# Patient Record
Sex: FEMALE | Race: WHITE | NOT HISPANIC OR LATINO | Employment: FULL TIME | URBAN - METROPOLITAN AREA
[De-identification: names, ages, dates, MRNs, and addresses within clinical notes are randomized per-mention and may not be internally consistent; named-entity substitution may affect disease eponyms.]

---

## 2017-01-09 ENCOUNTER — OFFICE VISIT (OUTPATIENT)
Dept: FAMILY MEDICINE | Facility: CLINIC | Age: 52
End: 2017-01-09
Payer: COMMERCIAL

## 2017-01-09 VITALS
HEART RATE: 75 BPM | DIASTOLIC BLOOD PRESSURE: 82 MMHG | SYSTOLIC BLOOD PRESSURE: 128 MMHG | TEMPERATURE: 97.4 F | HEIGHT: 60 IN

## 2017-01-09 DIAGNOSIS — F41.9 ANXIETY: Primary | ICD-10-CM

## 2017-01-09 PROCEDURE — 99213 OFFICE O/P EST LOW 20 MIN: CPT | Performed by: NURSE PRACTITIONER

## 2017-01-09 RX ORDER — VENLAFAXINE HYDROCHLORIDE 75 MG/1
75 CAPSULE, EXTENDED RELEASE ORAL DAILY
Qty: 90 CAPSULE | Refills: 3 | Status: SHIPPED | OUTPATIENT
Start: 2017-01-09 | End: 2017-07-21

## 2017-01-09 ASSESSMENT — PATIENT HEALTH QUESTIONNAIRE - PHQ9: 5. POOR APPETITE OR OVEREATING: NOT AT ALL

## 2017-01-09 ASSESSMENT — ANXIETY QUESTIONNAIRES
3. WORRYING TOO MUCH ABOUT DIFFERENT THINGS: NOT AT ALL
1. FEELING NERVOUS, ANXIOUS, OR ON EDGE: NOT AT ALL
GAD7 TOTAL SCORE: 0
2. NOT BEING ABLE TO STOP OR CONTROL WORRYING: NOT AT ALL
5. BEING SO RESTLESS THAT IT IS HARD TO SIT STILL: NOT AT ALL
7. FEELING AFRAID AS IF SOMETHING AWFUL MIGHT HAPPEN: NOT AT ALL
6. BECOMING EASILY ANNOYED OR IRRITABLE: NOT AT ALL

## 2017-01-09 NOTE — PROGRESS NOTES
SUBJECTIVE:                                                    Elizabeth Santizo is a 51 year old female who presents to clinic today for the following health issues:      Anxiety Follow-Up    Status since last visit: Improved     Happy with the Effexor. Wants to stay at the 75 mg dose.    Other associated symptoms:None    Complicating factors:   Significant life event: No   Current substance abuse: None  Depression symptoms: No  PATRICIA-7 SCORE 9/26/2016   Total Score 19            Amount of exercise or physical activity: just started [employer]- work out challenge    Problems taking medications regularly: No    Medication side effects: gets very nauseated and dizzy if she doesn't take pill the same time every day;  Got very bad when she was out for 4 days    Diet:  Starting new diet- eating more healthy        Problem list and histories reviewed & adjusted, as indicated.  Additional history: as documented    Problem list, Medication list, Allergies, and Medical/Social/Surgical histories reviewed in EPIC and updated as appropriate.    ROS:  Constitutional, HEENT, cardiovascular, pulmonary, gi and gu systems are negative, except as otherwise noted.    OBJECTIVE:                                                    /82 mmHg  Pulse 75  Temp(Src) 97.4  F (36.3  C) (Oral)  Ht 5' (1.524 m)  Wt   There is no weight on file to calculate BMI.  GENERAL: healthy, alert and no distress  PSYCH: mentation appears normal, affect normal/bright    PHQ-9 SCORE 9/26/2016 1/9/2017   Total Score 7 0       PATRICIA-7 SCORE 9/26/2016 1/9/2017   Total Score 19 0            ASSESSMENT/PLAN:                                                        ICD-10-CM    1. Anxiety F41.9 venlafaxine (EFFEXOR-XR) 75 MG 24 hr capsule     Well controlled.  Continue Effexor 75 mg daily.      The risks, benefits and treatment options of prescribed medications or other treatments have been discussed with the patient. The patient verbalized their  understanding and should call or follow up if no improvement or if they develop further problems.  SOLANGE [facility]

## 2017-01-09 NOTE — NURSING NOTE
Chief Complaint   Patient presents with     Anxiety     medication refill       Initial /82 mmHg  Pulse 75  Temp(Src) 97.4  F (36.3  C) (Oral)  Ht 5' (1.524 m)  Wt  Estimated body mass index is 25.97 kg/(m^2) as calculated from the following:    Height as of this encounter: 5' (1.524 m).    Weight as of 9/26/16: 133 lb (60.328 kg).  BP completed using cuff size: regular

## 2017-01-09 NOTE — PATIENT INSTRUCTIONS
Thank you for choosing [facility].  You may be receiving a survey in the mail from Shamika Aaron regarding your visit today.  Please take a few minutes to complete and return the survey to let us know how we are doing.      If you have questions or concerns, please contact us via [employer] or you can contact your care team at [phone].    Our Clinic hours are:  Monday 6:40 am  to 7:00 pm  Tuesday -Friday 6:40 am to 5:00 pm    The [place] outpatient lab hours are:  Monday - Friday 6:10 am to 4:45 pm  Saturdays 7:00 am to 11:00 am  Appointments are required, call [phone]    If you have clinical questions after hours or would like to schedule an appointment,  call the clinic at [phone].

## 2017-01-10 ASSESSMENT — PATIENT HEALTH QUESTIONNAIRE - PHQ9: SUM OF ALL RESPONSES TO PHQ QUESTIONS 1-9: 0

## 2017-01-10 ASSESSMENT — ANXIETY QUESTIONNAIRES: GAD7 TOTAL SCORE: 0

## 2017-03-20 ENCOUNTER — HOSPITAL ENCOUNTER (EMERGENCY)
Facility: CLINIC | Age: 52
Discharge: HOME | End: 2017-03-20
Attending: FAMILY MEDICINE | Admitting: FAMILY MEDICINE
Payer: COMMERCIAL

## 2017-03-20 ENCOUNTER — TELEPHONE (OUTPATIENT)
Dept: NURSING | Facility: CLINIC | Age: 52
End: 2017-03-20

## 2017-03-20 VITALS
OXYGEN SATURATION: 97 % | TEMPERATURE: 98.7 F | RESPIRATION RATE: 18 BRPM | SYSTOLIC BLOOD PRESSURE: 127 MMHG | DIASTOLIC BLOOD PRESSURE: 83 MMHG

## 2017-03-20 DIAGNOSIS — K52.9 GASTROENTERITIS: ICD-10-CM

## 2017-03-20 LAB
ALBUMIN SERPL-MCNC: 4.2 G/DL (ref 3.4–5)
ALBUMIN UR-MCNC: 100 MG/DL
ALP SERPL-CCNC: 81 U/L (ref 40–150)
ALT SERPL W P-5'-P-CCNC: 62 U/L (ref 0–50)
ANION GAP SERPL CALCULATED.3IONS-SCNC: 10 MMOL/L (ref 3–14)
APPEARANCE UR: ABNORMAL
AST SERPL W P-5'-P-CCNC: 41 U/L (ref 0–45)
BASOPHILS # BLD AUTO: 0.1 10E9/L (ref 0–0.2)
BASOPHILS NFR BLD AUTO: 1.5 %
BILIRUB SERPL-MCNC: 0.4 MG/DL (ref 0.2–1.3)
BILIRUB UR QL STRIP: NEGATIVE
BUN SERPL-MCNC: 5 MG/DL (ref 7–30)
CALCIUM SERPL-MCNC: 8.9 MG/DL (ref 8.5–10.1)
CHLORIDE SERPL-SCNC: 103 MMOL/L (ref 94–109)
CO2 SERPL-SCNC: 25 MMOL/L (ref 20–32)
COLOR UR AUTO: YELLOW
CREAT SERPL-MCNC: 0.59 MG/DL (ref 0.52–1.04)
DIFFERENTIAL METHOD BLD: NORMAL
EOSINOPHIL # BLD AUTO: 0 10E9/L (ref 0–0.7)
EOSINOPHIL NFR BLD AUTO: 0 %
ERYTHROCYTE [DISTWIDTH] IN BLOOD BY AUTOMATED COUNT: 11.8 % (ref 10–15)
GFR SERPL CREATININE-BSD FRML MDRD: ABNORMAL ML/MIN/1.7M2
GLUCOSE SERPL-MCNC: 116 MG/DL (ref 70–99)
GLUCOSE UR STRIP-MCNC: NEGATIVE MG/DL
HCT VFR BLD AUTO: 43.7 % (ref 35–47)
HGB BLD-MCNC: 14.1 G/DL (ref 11.7–15.7)
HGB UR QL STRIP: NEGATIVE
HYALINE CASTS #/AREA URNS LPF: 7 /LPF (ref 0–2)
IMM GRANULOCYTES # BLD: 0 10E9/L (ref 0–0.4)
IMM GRANULOCYTES NFR BLD: 0.2 %
KETONES UR STRIP-MCNC: 10 MG/DL
LEUKOCYTE ESTERASE UR QL STRIP: NEGATIVE
LYMPHOCYTES # BLD AUTO: 0.8 10E9/L (ref 0.8–5.3)
LYMPHOCYTES NFR BLD AUTO: 17.7 %
MCH RBC QN AUTO: 30.5 PG (ref 26.5–33)
MCHC RBC AUTO-ENTMCNC: 32.3 G/DL (ref 31.5–36.5)
MCV RBC AUTO: 95 FL (ref 78–100)
MONOCYTES # BLD AUTO: 0.3 10E9/L (ref 0–1.3)
MONOCYTES NFR BLD AUTO: 6.1 %
MUCOUS THREADS #/AREA URNS LPF: PRESENT /LPF
NEUTROPHILS # BLD AUTO: 3.5 10E9/L (ref 1.6–8.3)
NEUTROPHILS NFR BLD AUTO: 74.5 %
NITRATE UR QL: NEGATIVE
PH UR STRIP: 5.5 PH (ref 5–7)
PLATELET # BLD AUTO: 274 10E9/L (ref 150–450)
POTASSIUM SERPL-SCNC: 3.2 MMOL/L (ref 3.4–5.3)
PROT SERPL-MCNC: 8.3 G/DL (ref 6.8–8.8)
RBC # BLD AUTO: 4.62 10E12/L (ref 3.8–5.2)
RBC #/AREA URNS AUTO: <1 /HPF (ref 0–2)
SODIUM SERPL-SCNC: 138 MMOL/L (ref 133–144)
SP GR UR STRIP: 1.01 (ref 1–1.03)
SQUAMOUS #/AREA URNS AUTO: 5 /HPF (ref 0–1)
URN SPEC COLLECT METH UR: ABNORMAL
UROBILINOGEN UR STRIP-MCNC: NORMAL MG/DL (ref 0–2)
WBC # BLD AUTO: 4.7 10E9/L (ref 4–11)
WBC #/AREA URNS AUTO: 2 /HPF (ref 0–2)

## 2017-03-20 PROCEDURE — 99285 EMERGENCY DEPT VISIT HI MDM: CPT | Performed by: FAMILY MEDICINE

## 2017-03-20 PROCEDURE — 87493 C DIFF AMPLIFIED PROBE: CPT | Performed by: FAMILY MEDICINE

## 2017-03-20 PROCEDURE — 96361 HYDRATE IV INFUSION ADD-ON: CPT

## 2017-03-20 PROCEDURE — 25000128 H RX IP 250 OP 636: Performed by: FAMILY MEDICINE

## 2017-03-20 PROCEDURE — 99285 EMERGENCY DEPT VISIT HI MDM: CPT | Mod: 25

## 2017-03-20 PROCEDURE — 96375 TX/PRO/DX INJ NEW DRUG ADDON: CPT

## 2017-03-20 PROCEDURE — 81001 URINALYSIS AUTO W/SCOPE: CPT | Performed by: FAMILY MEDICINE

## 2017-03-20 PROCEDURE — 85025 COMPLETE CBC W/AUTO DIFF WBC: CPT | Performed by: FAMILY MEDICINE

## 2017-03-20 PROCEDURE — 80053 COMPREHEN METABOLIC PANEL: CPT | Performed by: FAMILY MEDICINE

## 2017-03-20 PROCEDURE — 87506 IADNA-DNA/RNA PROBE TQ 6-11: CPT | Performed by: FAMILY MEDICINE

## 2017-03-20 PROCEDURE — 96374 THER/PROPH/DIAG INJ IV PUSH: CPT

## 2017-03-20 RX ORDER — ONDANSETRON 2 MG/ML
4 INJECTION INTRAMUSCULAR; INTRAVENOUS ONCE
Status: DISCONTINUED | OUTPATIENT
Start: 2017-03-20 | End: 2017-03-20 | Stop reason: HOSPADM

## 2017-03-20 RX ORDER — ONDANSETRON 2 MG/ML
4 INJECTION INTRAMUSCULAR; INTRAVENOUS ONCE
Status: COMPLETED | OUTPATIENT
Start: 2017-03-20 | End: 2017-03-20

## 2017-03-20 RX ORDER — HYDROMORPHONE HYDROCHLORIDE 1 MG/ML
0.5 INJECTION, SOLUTION INTRAMUSCULAR; INTRAVENOUS; SUBCUTANEOUS
Status: DISCONTINUED | OUTPATIENT
Start: 2017-03-20 | End: 2017-03-20 | Stop reason: HOSPADM

## 2017-03-20 RX ORDER — ONDANSETRON 4 MG/1
4 TABLET, ORALLY DISINTEGRATING ORAL EVERY 6 HOURS PRN
Qty: 10 TABLET | Refills: 0 | Status: SHIPPED | OUTPATIENT
Start: 2017-03-20 | End: 2017-03-23

## 2017-03-20 RX ORDER — HYDROCODONE BITARTRATE AND ACETAMINOPHEN 5; 325 MG/1; MG/1
TABLET ORAL
Qty: 20 TABLET | Refills: 0 | Status: SHIPPED | OUTPATIENT
Start: 2017-03-20 | End: 2017-07-21

## 2017-03-20 RX ADMIN — HYDROMORPHONE HYDROCHLORIDE 0.5 MG: 1 INJECTION, SOLUTION INTRAMUSCULAR; INTRAVENOUS; SUBCUTANEOUS at 17:25

## 2017-03-20 RX ADMIN — ONDANSETRON 4 MG: 2 INJECTION INTRAMUSCULAR; INTRAVENOUS at 17:18

## 2017-03-20 RX ADMIN — SODIUM CHLORIDE 1000 ML: 9 INJECTION, SOLUTION INTRAVENOUS at 19:04

## 2017-03-20 RX ADMIN — SODIUM CHLORIDE 1000 ML: 9 INJECTION, SOLUTION INTRAVENOUS at 17:18

## 2017-03-20 ASSESSMENT — ENCOUNTER SYMPTOMS
FEVER: 1
POLYDIPSIA: 0
APPETITE CHANGE: 1
BACK PAIN: 0
EYE PAIN: 0
DIZZINESS: 1
ABDOMINAL PAIN: 1
CHILLS: 1
SHORTNESS OF BREATH: 0
DYSURIA: 0
FATIGUE: 1
CONFUSION: 0
DIARRHEA: 1
DIAPHORESIS: 1
LIGHT-HEADEDNESS: 1
HEADACHES: 1
EYE REDNESS: 0
VOMITING: 1
COUGH: 0
SORE THROAT: 0
WEAKNESS: 1
FREQUENCY: 0
NAUSEA: 1

## 2017-03-20 NOTE — TELEPHONE ENCOUNTER
"Call Type: Triage Call    Presenting Problem: Patient calling reporting \"vomiting and diarrhea\"  starting Saturday 3/18/17.  x 2 episodes of vomiting today.  Reporting several loose watery stools. Reporting recent travel to  [place] in past month. Dizziness. Dry mouth.  Triage Note:  Guideline Title: Nausea or Vomiting  Recommended Disposition: See ED Immediately  Original Inclination: Did not know what to do  Override Disposition:  Intended Action: Follow advice given  Physician Contacted: No  Signs of dehydration ?  YES  Abdominal pain is more bothersome than vomiting ? NO  Possible or known pregnancy ? NO  Known diabetic ? NO  New or worsening signs and symptoms that may indicate shock ? NO  Foreign body in mouth, throat, or esophagus ? NO  Smoke/carbon monoxide exposure ? NO  Any change in vision OR eye pain ? NO  Other vomiting of blood (red, black, coffee ground, scant, or large amount) ? NO  Following ingestion of toxic or caustic substance ? NO  Unbearable abdominal/pelvic pain ? NO  Vomiting red, bloody or coffee-ground material, more than streaks of blood or  scant amount (not following nosebleed within past day) ? NO  Any vomiting associated with a head injury, now or within previous 48 hours ? NO  New onset nausea/vomiting associated with stiff neck causing pain with forward  head movement, severe generalized headache, fever, or altered mental status ? NO  Sudden onset vomiting following ingestion or inhalation overdose (accidental or  intentional) of illegal, prescription, nonprescription or alternative drugs ? NO  Any other cardiac signs/symptoms for more than 5 minutes, now or within last hour.  Pain is NOT associated with taking a deep breath or a productive cough, movement,  or touch to a localized area on the chest or upper body. ? NO  Vomiting associated with sudden, severe disabling head pain. ? NO  Vomiting associated with sudden onset of focal neurological changes (difficulty  speaking, " numbness, weakness, paralysis, loss of coordination, change in vision  such as double vision or loss of visual field) ? NO  High to low (but not zero) risk of exposure to Ebola within the past 21 days ? NO  Physician Instructions:  Care Advice: Another adult should drive.  Call  if signs and symptoms of shock develop (such as unable to  stand due to faintness, dizziness, or lightheadedness  new onset of confusion  slow to respond or difficult to awaken  skin is pale, gray, cool, or moist to touch  severe weakness  loss of consciousness).  Take sips of clear liquids (such as water, clear fruit juices without pulp,  soda, tea or coffee without dairy or non-dairy creamer, clear broth or  bouillon, oral hydration solution, or plain gelatin, fruit ices/popsicles,  hard candy) as tolerated. Sucking on ice chips is another option.

## 2017-03-20 NOTE — ED PROVIDER NOTES
History     Chief Complaint   Patient presents with     Nausea, Vomiting, & Diarrhea     since saturday     HPI  Ms. Elizabeth Santizo is a 51 year old female with hyperlipidemia, hypertension who presents to the ED today for evaluation of nausea, vomiting, diarrhea. The patient reports 2 days of vomiting, nausea, diarrhea, and headache. She report symptoms started on 3/18 with diarrhea and vomiting throuhgout the day. She notes beginning to feel improved on 3/19 until roughly 4-6 PM when she began to deteriorate again with additional fever, chills, sweats, body aches, and headache. She states abdominal pain presents as cramping to her mid abdomen. She reports feeling dehydrated noting she was not able to stand up without dizziness or palpitations. She tried Excedrin for her headache which minimally improved her symptoms as well as Imodium for diarrhea, last dose was at 1 PM. The patient recently returned from a vacation to [place] on 3/6, however, her  and daughter who were with her have not gotten ill since returning. She takes atenolol 25 mg daily. She denies frequent use of antibiotics (~once per year) or any adverse effects while taking antibiotics. She denies additional travel outside of the country.     I have reviewed the Medications, Allergies, Past Medical and Surgical History, and Social History in the Epic system.  Patient Active Problem List   Diagnosis     Hypertension goal BP (blood pressure) < 140/90     Hyperlipidemia with target LDL less than 130     Anxiety     Impaired fasting glucose     Current Outpatient Prescriptions   Medication Sig Dispense Refill     Omega-3 Fatty Acids (OMEGA 3 PO) Take 2,400 mg by mouth daily       HYDROcodone-acetaminophen (NORCO) 5-325 MG per tablet Take 1-2 tablets every 6 hours as needed for pain 20 tablet 0     ondansetron (ZOFRAN ODT) 4 MG ODT tab Take 1 tablet (4 mg) by mouth every 6 hours as needed for nausea 10 tablet 0     venlafaxine (EFFEXOR-XR) 75  MG 24 hr capsule Take 1 capsule (75 mg) by mouth daily 90 capsule 3     atorvastatin (LIPITOR) 40 MG tablet Take 1 tablet (40 mg) by mouth daily 90 tablet 3     atenolol (TENORMIN) 25 MG tablet Take 1 tablet (25 mg) by mouth daily 90 tablet 3     Ascorbic Acid (VITAMIN C PO) Take 500 mg by mouth daily        fluticasone (FLONASE) 50 MCG/ACT nasal spray Spray 1 spray into both nostrils daily 1 Bottle 11     Allergies   Allergen Reactions     Amoxicillin Rash     Review of Systems   Constitutional: Positive for appetite change, chills, diaphoresis, fatigue and fever.   HENT: Negative for ear pain and sore throat.    Eyes: Negative for pain and redness.   Respiratory: Negative for cough and shortness of breath.    Gastrointestinal: Positive for abdominal pain, diarrhea, nausea and vomiting.   Endocrine: Negative for polydipsia and polyuria.   Genitourinary: Negative for dysuria and frequency.   Musculoskeletal: Negative for back pain.   Skin: Negative for pallor and rash.   Allergic/Immunologic: Negative for immunocompromised state.   Neurological: Positive for dizziness, weakness, light-headedness and headaches.   Psychiatric/Behavioral: Negative for confusion.     Physical Exam   BP: 124/87  Heart Rate: 124  Temp: 98.7  F (37.1  C)  Resp: 18  SpO2: 97 %  Physical Exam   Constitutional: She is oriented to person, place, and time. She appears well-developed and well-nourished. No distress.   HENT:   Head: Normocephalic.   Right Ear: External ear normal.   Left Ear: External ear normal.   Nose: Nose normal.   Mouth/Throat: Oropharynx is clear and moist.   Eyes: Conjunctivae are normal.   Neck: Normal range of motion. Neck supple.   Cardiovascular: Normal rate, regular rhythm and normal heart sounds.    Pulmonary/Chest: Effort normal and breath sounds normal.   Abdominal: Soft. Bowel sounds are normal.   Musculoskeletal: Normal range of motion.   Neurological: She is alert and oriented to person, place, and time.   Skin:  Skin is warm and dry.   Psychiatric: She has a normal mood and affect. Her behavior is normal.   Nursing note and vitals reviewed.      ED Course     ED Course     Procedures           Results for orders placed or performed during the hospital encounter of 03/20/17 (from the past 48 hour(s))   UA reflex to Microscopic   Result Value Ref Range    Color Urine Yellow     Appearance Urine Slightly Cloudy     Glucose Urine Negative NEG mg/dL    Bilirubin Urine Negative NEG    Ketones Urine 10 (A) NEG mg/dL    Specific Gravity Urine 1.015 1.003 - 1.035    Blood Urine Negative NEG    pH Urine 5.5 5.0 - 7.0 pH    Protein Albumin Urine 100 (A) NEG mg/dL    Urobilinogen mg/dL Normal 0.0 - 2.0 mg/dL    Nitrite Urine Negative NEG    Leukocyte Esterase Urine Negative NEG    Source Midstream Urine     RBC Urine <1 0 - 2 /HPF    WBC Urine 2 0 - 2 /HPF    Squamous Epithelial /HPF Urine 5 (H) 0 - 1 /HPF    Mucous Urine Present (A) NEG /LPF    Hyaline Casts 7 (H) 0 - 2 /LPF   CBC with platelets, differential   Result Value Ref Range    WBC 4.7 4.0 - 11.0 10e9/L    RBC Count 4.62 3.8 - 5.2 10e12/L    Hemoglobin 14.1 11.7 - 15.7 g/dL    Hematocrit 43.7 35.0 - 47.0 %    MCV 95 78 - 100 fl    MCH 30.5 26.5 - 33.0 pg    MCHC 32.3 31.5 - 36.5 g/dL    RDW 11.8 10.0 - 15.0 %    Platelet Count 274 150 - 450 10e9/L    Diff Method Automated Method     % Neutrophils 74.5 %    % Lymphocytes 17.7 %    % Monocytes 6.1 %    % Eosinophils 0.0 %    % Basophils 1.5 %    % Immature Granulocytes 0.2 %    Absolute Neutrophil 3.5 1.6 - 8.3 10e9/L    Absolute Lymphocytes 0.8 0.8 - 5.3 10e9/L    Absolute Monocytes 0.3 0.0 - 1.3 10e9/L    Absolute Eosinophils 0.0 0.0 - 0.7 10e9/L    Absolute Basophils 0.1 0.0 - 0.2 10e9/L    Abs Immature Granulocytes 0.0 0 - 0.4 10e9/L   Comprehensive metabolic panel   Result Value Ref Range    Sodium 138 133 - 144 mmol/L    Potassium 3.2 (L) 3.4 - 5.3 mmol/L    Chloride 103 94 - 109 mmol/L    Carbon Dioxide 25 20 - 32 mmol/L     Anion Gap 10 3 - 14 mmol/L    Glucose 116 (H) 70 - 99 mg/dL    Urea Nitrogen 5 (L) 7 - 30 mg/dL    Creatinine 0.59 0.52 - 1.04 mg/dL    GFR Estimate >90  Non  GFR Calc   >60 mL/min/1.7m2    GFR Estimate If Black >90   GFR Calc   >60 mL/min/1.7m2    Calcium 8.9 8.5 - 10.1 mg/dL    Bilirubin Total 0.4 0.2 - 1.3 mg/dL    Albumin 4.2 3.4 - 5.0 g/dL    Protein Total 8.3 6.8 - 8.8 g/dL    Alkaline Phosphatase 81 40 - 150 U/L    ALT 62 (H) 0 - 50 U/L    AST 41 0 - 45 U/L         Critical Care time:  none                        Medications   ondansetron (ZOFRAN) injection 4 mg (not administered)   HYDROmorphone (PF) (DILAUDID) injection 0.5 mg (0.5 mg Intravenous Given 3/20/17 1725)   0.9% sodium chloride BOLUS (0 mLs Intravenous Stopped 3/20/17 1827)   ondansetron (ZOFRAN) injection 4 mg (4 mg Intravenous Given 3/20/17 1718)   0.9% sodium chloride BOLUS (1,000 mLs Intravenous New Bag 3/20/17 1904)     5:13 PM Patient Assessed   Assessments & Plan (with Medical Decision Making)   JOSE FRANCISCO--51-year-old female who presents with three-day history of fever or chills nausea vomiting and diarrhea. She has crampy abdominal pain related to the vomiting and diarrhea but no constant abdominal pain. She was last in [place] 2 weeks ago and did not become ill there and has been well for the past 2 weeks. Her  is not ill. She has no history of C. difficile or any recent antibiotic use. This is not a reoccurring problem. Today she has symptoms of dehydration feeling weak and dizzy lightheaded and a pounding heart when she is up moving around. Her labs look okay. She was given IV fluids for hydration, IV Zofran for nausea and IV Dilaudid for her headache. She is discharged home in improved condition. We discussed antibiotic use and I do not feel it is indicated at this time. We'll send her stool for enteric pathogens and C. difficile. She can use some Imodium of the diarrhea severe. She is given Zofran  for nausea. If all her symptoms stay severe she may need to return for further evaluation or more IV fluids tomorrow. The patient and her  are comfortable with this evaluation treatment discharge and follow-up plan. The patient discharged in improved condition.  I have reviewed the nursing notes.    I have reviewed the findings, diagnosis, plan and need for follow up with the patient.    New Prescriptions    HYDROCODONE-ACETAMINOPHEN (NORCO) 5-325 MG PER TABLET    Take 1-2 tablets every 6 hours as needed for pain    ONDANSETRON (ZOFRAN ODT) 4 MG ODT TAB    Take 1 tablet (4 mg) by mouth every 6 hours as needed for nausea       Final diagnoses:   Gastroenteritis     This document serves as a record of the services and decisions personally performed and made by Juvencio Escoto MD. It was created on HIS/HER behalf by Destin Jenkins, a trained medical scribe. The creation of this document is based the provider's statements to the medical scribe.  Destin Jenkins 5:13 PM 3/20/2017    Provider:   The information in this document, created by the medical scribe for me, accurately reflects the services I personally performed and the decisions made by me. I have reviewed and approved this document for accuracy prior to leaving the patient care area.  Juvencio Escoto MD 5:13 PM 3/20/2017    3/20/2017   [facility] EMERGENCY DEPARTMENT     Juvencio Escoto MD  03/20/17 1937

## 2017-03-20 NOTE — ED AVS SNAPSHOT
[facility] Emergency Department    [address] [address]    Phone:  [phone]    Fax:  [phone]                                       Elizabeth Santizo   MRN: [id number]    Department:  [facility] Emergency Department   Date of Visit:  3/20/2017           After Visit Summary Signature Page     I have received my discharge instructions, and my questions have been answered. I have discussed any challenges I see with this plan with the nurse or doctor.    ..........................................................................................................................................  Patient/Patient Representative Signature      ..........................................................................................................................................  Patient Representative Print Name and Relationship to Patient    ..................................................               ................................................  Date                                            Time    ..........................................................................................................................................  Reviewed by Signature/Title    ...................................................              ..............................................  Date                                                            Time

## 2017-03-20 NOTE — ED AVS SNAPSHOT
[facility] Emergency Department    [address] [address]    Phone:  [phone]    Fax:  [phone]                                       Elizabeth Santizo   MRN: [id number]    Department:  [facility] Emergency Department   Date of Visit:  3/20/2017           Patient Information     Date Of Birth          1965        Your diagnoses for this visit were:     Gastroenteritis        You were seen by Tig[facility], Juvencio WORTHINGTON MD.      Follow-up Information     Follow up with [facility] Emergency Department.    Specialty:  EMERGENCY MEDICINE    Why:  If symptoms worsen    Contact information:    [facility] [facility] [id number] [phone]    Additional information:    The medical center is located at   [address] (between [address] in [place], [place] north   of [place]).        Follow up with Ilana Mares APRN CNP.    Specialty:  Nurse Practitioner    Why:  As needed    Contact information:    [place] [id number] [facility] [address]  [phone]        Discharge References/Attachments     VOMITING AND DIARRHEA, SELF-CARE FOR (ENGLISH)      24 Hour Appointment Hotline       To make an appointment at any [facility], call [phone]). If you don't have a family doctor or clinic, we will help you find one. [facility] are conveniently located to serve the needs of you and your family.          ED Discharge Orders     Clostridium difficile toxin B PCR           Enteric Bacteria and Virus Panel by JONG Stool                    Review of your medicines      START taking        Dose / Directions Last dose taken    HYDROcodone-acetaminophen 5-325 MG per tablet   Commonly known as:  NORCO   Quantity:  20 tablet        Take 1-2 tablets every 6 hours as needed for pain   Refills:  0        ondansetron 4 MG ODT tab   Commonly known as:  ZOFRAN ODT   Dose:  4 mg   Quantity:  10 tablet         Take 1 tablet (4 mg) by mouth every 6 hours as needed for nausea   Refills:  0          Our records show that you are taking the medicines listed below. If these are incorrect, please call your family doctor or clinic.        Dose / Directions Last dose taken    atenolol 25 MG tablet   Commonly known as:  TENORMIN   Dose:  25 mg   Quantity:  90 tablet        Take 1 tablet (25 mg) by mouth daily   Refills:  3        atorvastatin 40 MG tablet   Commonly known as:  LIPITOR   Dose:  40 mg   Quantity:  90 tablet        Take 1 tablet (40 mg) by mouth daily   Refills:  3        fluticasone 50 MCG/ACT spray   Commonly known as:  FLONASE   Dose:  1 spray   Quantity:  1 Bottle        Spray 1 spray into both nostrils daily   Refills:  11        OMEGA 3 PO   Dose:  2400 mg        Take 2,400 mg by mouth daily   Refills:  0        venlafaxine 75 MG 24 hr capsule   Commonly known as:  EFFEXOR-XR   Dose:  75 mg   Quantity:  90 capsule        Take 1 capsule (75 mg) by mouth daily   Refills:  3        VITAMIN C PO   Dose:  500 mg        Take 500 mg by mouth daily   Refills:  0                Prescriptions were sent or printed at these locations (2 Prescriptions)                   [facility] [place], [place] - [address]   [address] [address]    Telephone:  [phone]   Fax:  [phone]   Hours:                  Printed at Department/Unit printer (2 of 2)         HYDROcodone-acetaminophen (NORCO) 5-325 MG per tablet               ondansetron (ZOFRAN ODT) 4 MG ODT tab                Procedures and tests performed during your visit     CBC with platelets, differential    Clostridium difficile toxin B PCR    Comprehensive metabolic panel    Enteric Bacteria and Virus Panel by JONG Stool    UA reflex to Microscopic      Orders Needing Specimen Collection     None      Pending Results     Date and Time Order Name Status Description    3/20/2017 1721 Clostridium difficile toxin B PCR In process      3/20/2017 1721 Enteric Bacteria and Virus Panel by JONG Stool In process             Pending Culture Results     Date and Time Order Name Status Description    3/20/2017 1721 Clostridium difficile toxin B PCR In process     3/20/2017 1721 Enteric Bacteria and Virus Panel by JONG Stool In process              Test Results from your hospital stay     3/20/2017  5:23 PM - Interface, Flexilab Results      Component Results     Component Value Ref Range & Units Status    WBC 4.7 4.0 - 11.0 10e9/L Final    RBC Count 4.62 3.8 - 5.2 10e12/L Final    Hemoglobin 14.1 11.7 - 15.7 g/dL Final    Hematocrit 43.7 35.0 - 47.0 % Final    MCV 95 78 - 100 fl Final    MCH 30.5 26.5 - 33.0 pg Final    MCHC 32.3 31.5 - 36.5 g/dL Final    RDW 11.8 10.0 - 15.0 % Final    Platelet Count 274 150 - 450 10e9/L Final    Diff Method Automated Method  Final    % Neutrophils 74.5 % Final    % Lymphocytes 17.7 % Final    % Monocytes 6.1 % Final    % Eosinophils 0.0 % Final    % Basophils 1.5 % Final    % Immature Granulocytes 0.2 % Final    Absolute Neutrophil 3.5 1.6 - 8.3 10e9/L Final    Absolute Lymphocytes 0.8 0.8 - 5.3 10e9/L Final    Absolute Monocytes 0.3 0.0 - 1.3 10e9/L Final    Absolute Eosinophils 0.0 0.0 - 0.7 10e9/L Final    Absolute Basophils 0.1 0.0 - 0.2 10e9/L Final    Abs Immature Granulocytes 0.0 0 - 0.4 10e9/L Final         3/20/2017  5:40 PM - Interface, Flexilab Results      Component Results     Component Value Ref Range & Units Status    Sodium 138 133 - 144 mmol/L Final    Potassium 3.2 (L) 3.4 - 5.3 mmol/L Final    Chloride 103 94 - 109 mmol/L Final    Carbon Dioxide 25 20 - 32 mmol/L Final    Anion Gap 10 3 - 14 mmol/L Final    Glucose 116 (H) 70 - 99 mg/dL Final    Urea Nitrogen 5 (L) 7 - 30 mg/dL Final    Creatinine 0.59 0.52 - 1.04 mg/dL Final    GFR Estimate >90  Non  GFR Calc   >60 mL/min/1.7m2 Final    GFR Estimate If Black >90   GFR Calc   >60 mL/min/1.7m2 Final    Calcium 8.9 8.5  - 10.1 mg/dL Final    Bilirubin Total 0.4 0.2 - 1.3 mg/dL Final    Albumin 4.2 3.4 - 5.0 g/dL Final    Protein Total 8.3 6.8 - 8.8 g/dL Final    Alkaline Phosphatase 81 40 - 150 U/L Final    ALT 62 (H) 0 - 50 U/L Final    AST 41 0 - 45 U/L Final         3/20/2017  5:28 PM - Interface, Flexilab Results      Component Results     Component Value Ref Range & Units Status    Color Urine Yellow  Final    Appearance Urine Slightly Cloudy  Final    Glucose Urine Negative NEG mg/dL Final    Bilirubin Urine Negative NEG Final    Ketones Urine 10 (A) NEG mg/dL Final    Specific Gravity Urine 1.015 1.003 - 1.035 Final    Blood Urine Negative NEG Final    pH Urine 5.5 5.0 - 7.0 pH Final    Protein Albumin Urine 100 (A) NEG mg/dL Final    Urobilinogen mg/dL Normal 0.0 - 2.0 mg/dL Final    Nitrite Urine Negative NEG Final    Leukocyte Esterase Urine Negative NEG Final    Source Midstream Urine  Final    RBC Urine <1 0 - 2 /HPF Final    WBC Urine 2 0 - 2 /HPF Final    Squamous Epithelial /HPF Urine 5 (H) 0 - 1 /HPF Final    Mucous Urine Present (A) NEG /LPF Final    Hyaline Casts 7 (H) 0 - 2 /LPF Final         3/20/2017  6:50 PM - Interface, Flexilab Results         3/20/2017  6:53 PM - Interface, Flexilab Results                Thank you for choosing [facility]       Thank you for choosing [facility] for your care. Our goal is always to provide you with excellent care. Hearing back from our patients is one way we can continue to improve our services. Please take a few minutes to complete the written survey that you may receive in the mail after you visit with us. Thank you!        [employer] Information     [facility] gives you secure access to your electronic health record. If you see a primary care provider, you can also send messages to your care team and make appointments. If you have questions, please call your primary care clinic.  If you do not have a primary care provider, please call [phone] and they will assist you.         Care EveryWhere ID     This is your Care EveryWhere ID. This could be used by other organizations to access your [facility] medical records  [id number]        After Visit Summary       This is your record. Keep this with you and show to your community pharmacist(s) and doctor(s) at your next visit.

## 2017-03-21 ENCOUNTER — TELEPHONE (OUTPATIENT)
Dept: EMERGENCY MEDICINE | Facility: CLINIC | Age: 52
End: 2017-03-21

## 2017-03-21 ENCOUNTER — TELEPHONE (OUTPATIENT)
Dept: FAMILY MEDICINE | Facility: CLINIC | Age: 52
End: 2017-03-21

## 2017-03-21 LAB
C DIFF TOX B STL QL: NORMAL
CAMPYLOBACTER GROUP BY NAT: NOT DETECTED
ENTERIC PATHOGEN COMMENT: ABNORMAL
NOROVIRUS I AND II BY NAT: NOT DETECTED
ROTAVIRUS A BY NAT: NOT DETECTED
SALMONELLA SPECIES BY NAT: ABNORMAL
SHIGA TOXIN 1 GENE BY NAT: NOT DETECTED
SHIGA TOXIN 2 GENE BY NAT: NOT DETECTED
SHIGELLA SP+EIEC IPAH STL QL NAA+PROBE: NOT DETECTED
SPECIMEN SOURCE: NORMAL
VIBRIO GROUP BY NAT: NOT DETECTED
YERSINIA ENTEROCOLITICA BY NAT: NOT DETECTED

## 2017-03-21 NOTE — TELEPHONE ENCOUNTER
At 1751 Discussed Dr. Lindsey's recommendations discussed how to introduce food into diet and hydration importance. Note that if pt worsens or not improved by day 7 follow up right away.     Elizabeth Rodríguez, RN  [facility] RN  Lung Nodule and ED Lab Result F/u RN  Epic pool (ED late result f/u RN): [id number]  FV INCIDENTAL RADIOLOGY F/U NURSES: [id number]  # [phone]

## 2017-03-21 NOTE — TELEPHONE ENCOUNTER
"Reason for call:  Patient reporting a symptom    Symptom or request: diarrhea    Duration (how long have symptoms been present): since saturday    Have you been treated for this before? Yes    Additional comments: pt calling stating she is still having \"yellow diarrhea\" and is taking antidiarrheal medication. She was seen in the ER yesterday and diagnosed with the noro virus she thought. It says Gastroenteritis. She is wondering if there is something stronger she can take to get rid of the diarrhea. She is drinking fluids and has tried crackers and bread and butter but otherwise not eating much.    Phone Number patient can be reached at:  Other phone number:  [phone]    Best Time:  any    Can we leave a detailed message on this number:  YES    Call taken on 3/21/2017 at 11:50 AM by Sharon Fry    "

## 2017-03-21 NOTE — TELEPHONE ENCOUNTER
S-(situation): Spoke with pt.  She is positive for salmonella infection.    Pt was initially seen and evaluated in the ED.  Pt reports that symptoms started 3 days ago; the morning after eating at a dessert bar.  Pt is improved today from yesterday.    Yesterday, pt was vomiting, had frequent diarrhea, and was so weak that she could not walk without assistance.  Today, pt is no longer vomiting.  She is less weak today; can ambulate independently.   She is still experiencing diarrhea about every 20 minutes; same as yesterday.  She is hydrating well lots of water and sport drink.  She is urinating without difficulty and often.  Urine is clear-colored. She is taking anti-nausea medication for the nausea.  She has body aches and weakness but these symptoms are improved from yesterday.  No fever.    Denies blood in stool.  Denies other complicating symptoms.  Denies feeling light-headed or dizzy.  Denies chest pain or difficulty breathing.    B-(background): Per above.    A-(assessment): salmonella infection.    R-(recommendations): Advised per Pt Instructions, salmonella.  Pt will continue her interventions including the addition of soup broth.  Pt will return to the ED for any worsening symptoms or if symptoms are failing to improve as noted in Pt Instructions.  Lidya Peterson RN

## 2017-03-21 NOTE — PATIENT INSTRUCTIONS
Salmonella Gastroenteritis (Adult)    Salmonella is a bacterium that some animals carry. Examples include poultry, cows, fish, pigs, turtles, lizards, iguanas, dogs, and cats. You can get salmonella by eating food contaminated with animal feces or from feces contaminated water. Any foods may be contaminated, including vegetables, but beef, poultry, milk, eggs, and seafood are the main sources of contamination. Cooking food completely kills salmonella.  Salmonella infection occurs most often in infants and children, older adults, pregnant women and their unborn fetuses, and people with weak immune systems. Examples are people with HIV or sickle cell disease, those who have had their spleen removed, or people taking chemotherapy.  A salmonella infection can cause nausea, vomiting, abdominal cramps, diarrhea (sometimes with blood), fever, and headache. Symptoms appear within 12 to 72 hours of exposure and usually go away after 4 to 7 days. Mild symptoms will get better without any antibiotic treatment. More severe illness, or those at high risk, need to take antibiotics. Medicines to stop diarrhea can make the illness last longer and make symptoms worse, especially if used without antibiotics. Don't use these medicines unless your healthcare provider prescribes them.   Home care  Medications    If antibiotics are prescribed, be sure you take them until they are finished.    You may use acetaminophen or NSAID medicines like ibuprofen or naproxen to control fever, unless another medicine is prescribed. If you have chronic liver or kidney disease, talk with your healthcare provider before using these medicines. Also talk with your provider if you've had a stomach ulcer or gastrointestinal bleeding. Don't give aspirin to anyone under 18 years of age who is ill with a fever. It may cause severe liver damage. Don't use NSAID medicine if you are already taking one for another condition (like arthritis) or on aspirin (such  as for heart disease or after a stroke.)    If medicines for diarrhea or vomiting are prescribed, take only as directed.  General care    If symptoms are severe, rest at home for the next 24 hours, or until you feel better.    Washing your hands with soap and water or using alcohol-based  is the best way to stop the spread of infection. Wash your hands after touching anyone who is sick.    Wash your hands after using the toilet and before meals. Clean the toilet after each use.    Caffeine, tobacco, and alcohol can make your diarrhea, cramping, and pain worse.  Food    Water and clear liquids are important so you don't get dehydrated. Drink small amounts at a time.    Sports drinks may also help.    Don't force yourself to eat, especially if you have cramps, vomiting, or diarrhea. Don't eat large amounts at a time, even if you are hungry.    If you eat, avoid fatty, greasy, spicy, or fried foods.    Don't eat dairy products if you have diarrhea. These can make the diarrhea worse.  During the first 24 hours (the first full day), follow the diet below:    Beverages: Sports drinks, soft drinks without caffeine, mineral water, and decaffeinated tea and coffee. Keep in mind, though, that if you are very dehydrated, sports drinks aren't a good choice. They have too much sugar and not enough electrolytes. In this case, commercially available products called oral rehydration solutions, are best.    Soups: Clear broth, consommé, and bouillon    Desserts: Plain gelatin, popsicles, and fruit juice bars  During the next 24 hours (the second day), you may add the following to the above if you are better. If not, continue what you did the first day:    Hot cereal, plain toast, bread, rolls, crackers    Plain noodles, rice, mashed potatoes, chicken noodle or rice soup    Unsweetened canned fruit (avoid pineapple), bananas    Limit caffeine and chocolate. No spices or seasonings except salt.  During the next 24  hours:    Gradually resume a normal diet, as you feel better and your symptoms improve.    If at any time your symptoms start getting worse, go back to clear liquids until you feel better.  Food preparation    If you have diarrhea, you should not prepare or serve food to others.    When preparing foods, wash your hands before and after.    Wash your hands after using cutting boards, countertops, and knives that have been in contact with raw food.    Keep uncooked meats away from cooked and ready-to-eat foods.    Use a food thermometer when cooking. Cook poultry to at least 165.0 F (74.0 C). Cook ground meat (beef, veal, pork, lamb) to at least 160.0 F (71.0 C). Cook fresh beef, veal, lamb, and pork to at least 145.0 F (63.0 C).    Avoid raw or undercooked eggs (poached or janette side up), and unpasteurized milk and juices.    Wash and peel produce before eating.  Follow-up care  Follow up with your healthcare provider, or as advised. Call if you do not get better within 24 hours, or if diarrhea lasts more than 2 days. If a stool (diarrhea) sample was taken, call as directed for the results.  Call 911  Call 911 if any of these occur:    Trouble breathing    Confusion    Severe drowsiness or trouble awakening    Fainting or loss of consciousness    Rapid heart rate    Chest pain    Seizure    Stiff neck  When to seek medical advice  Call your healthcare provider right away if any of these occur:    Severe, constant pain in the abdomen    Continued vomiting (unable to keep liquids down)    Frequent diarrhea (more than 5 times a day)    Blood (red or black color) or mucus in diarrhea    Reduced oral intake    Signs of dehydration: increased thirst, reduced or no urine output, dark colored urine, dry skin.     Fever of 100.4 F (38 C) oral or higher, not better with fever medicine    New rash    1747-2559 [employer]. [address] [place] [address], [place] [address]. All rights reserved. This information is not  intended as a substitute for professional medical care. Always follow your healthcare professional's instructions.        Salmonella Infection (Salmonellosis)  Salmonella infection is also called salmonellosis. It's an illness that affects your intestines caused by Salmonella bacteria. You can be infected from eating or drinking contaminated food or water. Beef, pork, chicken, eggs, and unpasteurized milk are more likely to carry this bacteria than other foods. However, vegetables may also be contaminated. Salmonella most often passes through food that hasn t been cooked well enough, or that contacts raw meat or eggs. You can also be infected by contact with feces of infected animals, or by food that an infected  contaminates.      To prevent transmission of Salmonella, wash all utensils, dishes, and cutting boards with soap and hot water, especially after preparing raw meats or eggs.   Common symptoms of Salmonella infection  Symptoms often appear 12 to 72 hours after you are infected. Symptoms include:    Fever    Stomach cramps    Diarrhea    Vomiting    Nausea  Diagnosing Salmonella infection  A health care provider takes a sample of your stool and checks for Salmonella. More than one stool sample may be needed.  Treating Salmonella infection  Most people get better within 5 to 7 days. You will usually not need antibiotics (drugs that treat bacterial infections). However, if you have other medical problems, you may need antibiotics if bacteria move from your intestines to other parts of your body. Treatment is with fluids, and it is important to drink plenty of them while you are recovering. This helps prevent dehydration. Don't take antidiarrheal medication unless your doctor tells you to. This medication can prevent your body from getting rid of the bacteria. It can also make the illness last longer.  Call your health care provider if you have:    No improvement in symptoms after 2 days    Blood in  your stool    Severe vomiting    Severe abdominal pain    Signs of dehydration (dry, sticky mouth; decreased urine output; very dark urine)   Preventing Salmonella infection  Follow these steps to reduce your chances of getting or passing on Salmonella infection:    Wash your hands well with soap and warm water. Do this often. Make sure to wash before preparing meals. Wash after going to the bathroom, changing diapers, or handling pets or other animals. Teach your child to do the same.    Use a food thermometer when cooking. Cook poultry to at least 165 F (74 C). Cook pork and ground meats to at least 160 F (71 C). Cook beef or lamb to at least 145 F (63 C). Cook eggs until the yolks are firm and are not still runny.    Wash or peel fresh fruits and vegetables before eating.    Wash cutting boards and utensils with hot water and soap after each use. After preparing raw meat or eggs, clean boards and counters with hot water and soap.    [id number] [employer]. [address] [place] [address], [place] [address]. All rights reserved. This information is not intended as a substitute for professional medical care. Always follow your healthcare professional's instructions.

## 2017-03-21 NOTE — TELEPHONE ENCOUNTER
[facility] Emergency Department Lab result notification:    [place] ED lab result protocol used  Enteric Bacteria and Virus Panel by JONG Stool: Salmonella    Reason for call  Notify of lab results, assess symptoms,  review ED providers recommendations/discharge instructions (if necessary) and advise per ED lab result f/u protocol    Lab Result  Final Enteric Bacteria and Virus Panel by JONG Stool is POSITIVE for Salmonella species by JONG  Patient to be notified, symptom's assessed and advised per [place] ED lab result f/u protocol.  Information table from ED Provider visit on 03/20/2017  Symptoms reported at ED visit (Chief complaint, HPI) a 51 year old female with hyperlipidemia, hypertension who presents to the ED today for evaluation of nausea, vomiting, diarrhea. The patient reports 2 days of vomiting, nausea, diarrhea, and headache. She report symptoms started on 3/18 with diarrhea and vomiting throuhgout the day. She notes beginning to feel improved on 3/19 until roughly 4-6 PM when she began to deteriorate again with additional fever, chills, sweats, body aches, and headache. She states abdominal pain presents as cramping to her mid abdomen. She reports feeling dehydrated noting she was not able to stand up without dizziness or palpitations. She tried Excedrin for her headache which minimally improved her symptoms as well as Imodium for diarrhea, last dose was at 1 PM. The patient recently returned from a vacation to [place] on 3/6, however, her  and daughter who were with her have not gotten ill since returning. She takes atenolol 25 mg daily. She denies frequent use of antibiotics (~once per year) or any adverse effects while taking antibiotics. She denies additional travel outside of the country   ED providers Impression and Plan (applicable information) 51-year-old female who presents with three-day history of fever or chills nausea vomiting and diarrhea. She has crampy abdominal pain related  "to the vomiting and diarrhea but no constant abdominal pain. She was last in [place] 2 weeks ago and did not become ill there and has been well for the past 2 weeks. Her  is not ill. She has no history of C. difficile or any recent antibiotic use. This is not a reoccurring problem. Today she has symptoms of dehydration feeling weak and dizzy lightheaded and a pounding heart when she is up moving around. Her labs look okay. She was given IV fluids for hydration, IV Zofran for nausea and IV Dilaudid for her headache. She is discharged home in improved condition. We discussed antibiotic use and I do not feel it is indicated at this time. We'll send her stool for enteric pathogens and C. difficile. She can use some Imodium of the diarrhea severe. She is given Zofran for nausea. If all her symptoms stay severe she may need to return for further evaluation or more IV fluids tomorrow. The patient and her  are comfortable with this evaluation treatment discharge and follow-up plan. The patient discharged in improved condition.     RN Assessment (Patient s current Symptoms), include time called.  [Insert Left message here if message left]  At 1657 I still have body aches nausea, medication for nausea and headache, Imodium not working.  Unknown if has fever.  \"Skin sore\" the diarrhea is yellow water cant eat anything.  No abdominal pain.  Walking causes leakage, what can I take to stop this?   RN Recommendations/Instructions per [place] ED lab result protocol  At 1704 consulted Dr. Lindsey, no antibiotic treatment recommended, he recommends no imodium pt should get depends and let this bacteria run its course he prefers not to add drugs in the mix.   She can schedule dose her Zofran due to one of the adverse effects of Zofran is  slowing gut motility dose would be 4-8 mg TID.  No immodium or lomotil may lead to systemic toxicity and longer duration of clearing the bacteria.      At 1708 Left voicemail message " requesting a call back to [phone] between 10 a.m. and 6:30 p.m. for patient's ED/UC lab results. With regards to Dr. Lindsey's recommendations.      Elizabeth Rodríguze, RN  [employer] [facility] RN  Lung Nodule and ED Lab Result F/u RN  Epic pool (ED late result f/u RN): [id number]  FV INCIDENTAL RADIOLOGY F/U NURSES: P [id number]  # [phone]

## 2017-03-23 ENCOUNTER — HOSPITAL ENCOUNTER (EMERGENCY)
Facility: CLINIC | Age: 52
Discharge: HOME | End: 2017-03-23
Attending: FAMILY MEDICINE | Admitting: FAMILY MEDICINE
Payer: COMMERCIAL

## 2017-03-23 VITALS
RESPIRATION RATE: 16 BRPM | DIASTOLIC BLOOD PRESSURE: 77 MMHG | OXYGEN SATURATION: 96 % | SYSTOLIC BLOOD PRESSURE: 128 MMHG | TEMPERATURE: 97.6 F

## 2017-03-23 DIAGNOSIS — N30.00 ACUTE CYSTITIS WITHOUT HEMATURIA: ICD-10-CM

## 2017-03-23 DIAGNOSIS — E86.0 DEHYDRATION: ICD-10-CM

## 2017-03-23 LAB
ALBUMIN SERPL-MCNC: 3.6 G/DL (ref 3.4–5)
ALBUMIN UR-MCNC: 100 MG/DL
ALP SERPL-CCNC: 108 U/L (ref 40–150)
ALT SERPL W P-5'-P-CCNC: 103 U/L (ref 0–50)
ANION GAP SERPL CALCULATED.3IONS-SCNC: 7 MMOL/L (ref 3–14)
APPEARANCE UR: ABNORMAL
AST SERPL W P-5'-P-CCNC: 88 U/L (ref 0–45)
BASOPHILS # BLD AUTO: 0.1 10E9/L (ref 0–0.2)
BASOPHILS NFR BLD AUTO: 1.3 %
BILIRUB SERPL-MCNC: 0.4 MG/DL (ref 0.2–1.3)
BILIRUB UR QL STRIP: NEGATIVE
BUN SERPL-MCNC: 6 MG/DL (ref 7–30)
CALCIUM SERPL-MCNC: 8.8 MG/DL (ref 8.5–10.1)
CHLORIDE SERPL-SCNC: 103 MMOL/L (ref 94–109)
CO2 SERPL-SCNC: 29 MMOL/L (ref 20–32)
COLOR UR AUTO: YELLOW
CREAT SERPL-MCNC: 0.56 MG/DL (ref 0.52–1.04)
DIFFERENTIAL METHOD BLD: NORMAL
EOSINOPHIL # BLD AUTO: 0.1 10E9/L (ref 0–0.7)
EOSINOPHIL NFR BLD AUTO: 2.1 %
ERYTHROCYTE [DISTWIDTH] IN BLOOD BY AUTOMATED COUNT: 11.3 % (ref 10–15)
GFR SERPL CREATININE-BSD FRML MDRD: ABNORMAL ML/MIN/1.7M2
GLUCOSE SERPL-MCNC: 103 MG/DL (ref 70–99)
GLUCOSE UR STRIP-MCNC: NEGATIVE MG/DL
HCT VFR BLD AUTO: 37.9 % (ref 35–47)
HGB BLD-MCNC: 12.7 G/DL (ref 11.7–15.7)
HGB UR QL STRIP: ABNORMAL
IMM GRANULOCYTES # BLD: 0 10E9/L (ref 0–0.4)
IMM GRANULOCYTES NFR BLD: 0.8 %
KETONES UR STRIP-MCNC: NEGATIVE MG/DL
LEUKOCYTE ESTERASE UR QL STRIP: ABNORMAL
LYMPHOCYTES # BLD AUTO: 1.2 10E9/L (ref 0.8–5.3)
LYMPHOCYTES NFR BLD AUTO: 22.7 %
MCH RBC QN AUTO: 30.8 PG (ref 26.5–33)
MCHC RBC AUTO-ENTMCNC: 33.5 G/DL (ref 31.5–36.5)
MCV RBC AUTO: 92 FL (ref 78–100)
MONOCYTES # BLD AUTO: 0.7 10E9/L (ref 0–1.3)
MONOCYTES NFR BLD AUTO: 13.4 %
MUCOUS THREADS #/AREA URNS LPF: PRESENT /LPF
NEUTROPHILS # BLD AUTO: 3.1 10E9/L (ref 1.6–8.3)
NEUTROPHILS NFR BLD AUTO: 59.7 %
NITRATE UR QL: NEGATIVE
PH UR STRIP: 6 PH (ref 5–7)
PLATELET # BLD AUTO: 289 10E9/L (ref 150–450)
POTASSIUM SERPL-SCNC: 3.2 MMOL/L (ref 3.4–5.3)
PROT SERPL-MCNC: 7.4 G/DL (ref 6.8–8.8)
RBC # BLD AUTO: 4.12 10E12/L (ref 3.8–5.2)
RBC #/AREA URNS AUTO: 70 /HPF (ref 0–2)
SODIUM SERPL-SCNC: 139 MMOL/L (ref 133–144)
SP GR UR STRIP: 1.01 (ref 1–1.03)
SQUAMOUS #/AREA URNS AUTO: 5 /HPF (ref 0–1)
TRANS CELLS #/AREA URNS HPF: 14 /HPF (ref 0–1)
URN SPEC COLLECT METH UR: ABNORMAL
UROBILINOGEN UR STRIP-MCNC: NORMAL MG/DL (ref 0–2)
WBC # BLD AUTO: 5.2 10E9/L (ref 4–11)
WBC #/AREA URNS AUTO: >182 /HPF (ref 0–2)
WBC CLUMPS #/AREA URNS HPF: PRESENT /HPF

## 2017-03-23 PROCEDURE — 25800025 ZZH RX 258: Performed by: FAMILY MEDICINE

## 2017-03-23 PROCEDURE — 80053 COMPREHEN METABOLIC PANEL: CPT | Performed by: FAMILY MEDICINE

## 2017-03-23 PROCEDURE — 25000132 ZZH RX MED GY IP 250 OP 250 PS 637: Performed by: FAMILY MEDICINE

## 2017-03-23 PROCEDURE — 96374 THER/PROPH/DIAG INJ IV PUSH: CPT

## 2017-03-23 PROCEDURE — 85025 COMPLETE CBC W/AUTO DIFF WBC: CPT | Performed by: FAMILY MEDICINE

## 2017-03-23 PROCEDURE — 99284 EMERGENCY DEPT VISIT MOD MDM: CPT | Performed by: FAMILY MEDICINE

## 2017-03-23 PROCEDURE — 87086 URINE CULTURE/COLONY COUNT: CPT | Performed by: FAMILY MEDICINE

## 2017-03-23 PROCEDURE — 81001 URINALYSIS AUTO W/SCOPE: CPT | Performed by: FAMILY MEDICINE

## 2017-03-23 PROCEDURE — 87088 URINE BACTERIA CULTURE: CPT | Performed by: FAMILY MEDICINE

## 2017-03-23 PROCEDURE — 25000128 H RX IP 250 OP 636: Performed by: FAMILY MEDICINE

## 2017-03-23 PROCEDURE — 99284 EMERGENCY DEPT VISIT MOD MDM: CPT | Mod: 25

## 2017-03-23 PROCEDURE — 96361 HYDRATE IV INFUSION ADD-ON: CPT

## 2017-03-23 PROCEDURE — 87186 SC STD MICRODIL/AGAR DIL: CPT | Performed by: FAMILY MEDICINE

## 2017-03-23 RX ORDER — KETOROLAC TROMETHAMINE 15 MG/ML
15 INJECTION, SOLUTION INTRAMUSCULAR; INTRAVENOUS ONCE
Status: COMPLETED | OUTPATIENT
Start: 2017-03-23 | End: 2017-03-23

## 2017-03-23 RX ORDER — CIPROFLOXACIN 500 MG/1
500 TABLET, FILM COATED ORAL 2 TIMES DAILY
Qty: 14 TABLET | Refills: 0 | Status: SHIPPED | OUTPATIENT
Start: 2017-03-23 | End: 2017-03-30

## 2017-03-23 RX ORDER — CIPROFLOXACIN 500 MG/1
500 TABLET, FILM COATED ORAL ONCE
Status: COMPLETED | OUTPATIENT
Start: 2017-03-23 | End: 2017-03-23

## 2017-03-23 RX ADMIN — SODIUM CHLORIDE, POTASSIUM CHLORIDE, SODIUM LACTATE AND CALCIUM CHLORIDE 1000 ML: 600; 310; 30; 20 INJECTION, SOLUTION INTRAVENOUS at 19:19

## 2017-03-23 RX ADMIN — SODIUM CHLORIDE, POTASSIUM CHLORIDE, SODIUM LACTATE AND CALCIUM CHLORIDE 1000 ML: 600; 310; 30; 20 INJECTION, SOLUTION INTRAVENOUS at 18:14

## 2017-03-23 RX ADMIN — KETOROLAC TROMETHAMINE 15 MG: 15 INJECTION, SOLUTION INTRAMUSCULAR; INTRAVENOUS at 18:19

## 2017-03-23 RX ADMIN — CIPROFLOXACIN 500 MG: 500 TABLET, FILM COATED ORAL at 19:20

## 2017-03-23 NOTE — ED PROVIDER NOTES
History     Chief Complaint   Patient presents with     UTI     was in on Monday sent home with pain meds and zofran, which are not really helping, up all noc with diarrhea, and now having UTI s/sx and thinks she is dehydrated      Headache     HPI  Elizabeth Santizo is a 51 year old female who has a history of anxiety, hypertension, hyperlipidemia, and recently diagnosed salmonella who is here in the ED due to persistent diarrhea, and irritative voiding symptoms, and headache. Patient was seen on 03/20/2017 by Dr. Escoto after a trip to [place] from 02/27-03/06 for 2 days of nausea, vomiting, diarrhea, and headache. She had normal lab results, she was given IV fluids, IV Zofran, and IV Dilaudid and she was discharged home. A stool sample was later collected, and patient was notified on 03/21/2017 that she tested positive for salmonella infection, and she was instructed to return to the ED with worsening symptoms. Patient suspects her infection is from eating bagged salad. Patient states that she had been having around 50 bouts of diarrhea a day prior to yesterday when she only had 9. She notes that she felt very dehydrated yesterday. She started to have a headache again last night. At 21:00 last night, patient started to then have diarrhea again that was rather continuous until 03:00 this morning. Today, she feels more dehydrated and notes that the Vicodin that she was given at her ED visit for her headache is not helping. Patient took 3 tablets of ibuprofen on an empty stomach as well. Patient states that she still has a headache but it is not as severe as her migraine headaches. She was able to eat oatmeal, toast, and jello today. Patient states that she also thinks that she has a UTI because after she woke up from her afternoon nap today, she noticed that it hurt to urinate. Patient notes that her body aches have gone away since yesterday, and she has not vomited since Monday. She has a surgical history of  tubal ligation.Patient does not have abdominal pain, shortness of breath or trouble breathing, cough, chest pain, and hematuria.       Patient Active Problem List   Diagnosis     Hypertension goal BP (blood pressure) < 140/90     Hyperlipidemia with target LDL less than 130     Anxiety     Impaired fasting glucose     Current Outpatient Prescriptions   Medication Sig Dispense Refill     ciprofloxacin (CIPRO) 500 MG tablet Take 1 tablet (500 mg) by mouth 2 times daily for 7 days 14 tablet 0     Omega-3 Fatty Acids (OMEGA 3 PO) Take 2,400 mg by mouth daily       HYDROcodone-acetaminophen (NORCO) 5-325 MG per tablet Take 1-2 tablets every 6 hours as needed for pain 20 tablet 0     ondansetron (ZOFRAN ODT) 4 MG ODT tab Take 1 tablet (4 mg) by mouth every 6 hours as needed for nausea 10 tablet 0     venlafaxine (EFFEXOR-XR) 75 MG 24 hr capsule Take 1 capsule (75 mg) by mouth daily 90 capsule 3     atorvastatin (LIPITOR) 40 MG tablet Take 1 tablet (40 mg) by mouth daily 90 tablet 3     fluticasone (FLONASE) 50 MCG/ACT nasal spray Spray 1 spray into both nostrils daily 1 Bottle 11     atenolol (TENORMIN) 25 MG tablet Take 1 tablet (25 mg) by mouth daily 90 tablet 3     Ascorbic Acid (VITAMIN C PO) Take 500 mg by mouth daily        Allergies   Allergen Reactions     Amoxicillin Rash       I have reviewed the Medications, Allergies, Past Medical and Surgical History, and Social History in the Epic system.    Review of Systems  All other systems are reviewed and are negative    Physical Exam   BP: 126/72  Heart Rate: 100  Temp: 97.6  F (36.4  C)  Resp: 16  SpO2: 98 %  Physical Exam  Nursing note and vitals were reviewed.  Constitutional: Awake and alert, ill but nontoxic appearing 51-year-old in no acute distress, who answers questions appropriately and cooperates with examination.  HEENT: Dry mucous membranes EOMI. Oropharynx otherwise normal.  Neck: Freely mobile.  Cardiovascular: Cardiac examination reveals normal heart  rate and regular rhythm without murmur.  Pulmonary/Chest: Breathing is unlabored.  Breath sounds are clear and equal bilaterally.  There no retractions, tachypnea, rales, wheezes, or rhonchi.  Abdomen: Soft, nontender, no HSM or masses rebound or guarding.  Musculoskeletal: Extremities are warm and well-perfused and without edema  Neurological: Alert, oriented, thought content logical, coherent   Skin: Warm, dry, no rashes.  Psychiatric: Affect broad and appropriate.        ED Course     ED Course     Procedures             Critical Care time:  none                 Results for orders placed or performed during the hospital encounter of 03/23/17 (from the past 24 hour(s))   CBC with platelets differential   Result Value Ref Range    WBC 5.2 4.0 - 11.0 10e9/L    RBC Count 4.12 3.8 - 5.2 10e12/L    Hemoglobin 12.7 11.7 - 15.7 g/dL    Hematocrit 37.9 35.0 - 47.0 %    MCV 92 78 - 100 fl    MCH 30.8 26.5 - 33.0 pg    MCHC 33.5 31.5 - 36.5 g/dL    RDW 11.3 10.0 - 15.0 %    Platelet Count 289 150 - 450 10e9/L    Diff Method Automated Method     % Neutrophils 59.7 %    % Lymphocytes 22.7 %    % Monocytes 13.4 %    % Eosinophils 2.1 %    % Basophils 1.3 %    % Immature Granulocytes 0.8 %    Absolute Neutrophil 3.1 1.6 - 8.3 10e9/L    Absolute Lymphocytes 1.2 0.8 - 5.3 10e9/L    Absolute Monocytes 0.7 0.0 - 1.3 10e9/L    Absolute Eosinophils 0.1 0.0 - 0.7 10e9/L    Absolute Basophils 0.1 0.0 - 0.2 10e9/L    Abs Immature Granulocytes 0.0 0 - 0.4 10e9/L   Comprehensive metabolic panel   Result Value Ref Range    Sodium 139 133 - 144 mmol/L    Potassium 3.2 (L) 3.4 - 5.3 mmol/L    Chloride 103 94 - 109 mmol/L    Carbon Dioxide 29 20 - 32 mmol/L    Anion Gap 7 3 - 14 mmol/L    Glucose 103 (H) 70 - 99 mg/dL    Urea Nitrogen 6 (L) 7 - 30 mg/dL    Creatinine 0.56 0.52 - 1.04 mg/dL    GFR Estimate >90  Non  GFR Calc   >60 mL/min/1.7m2    GFR Estimate If Black >90   GFR Calc   >60 mL/min/1.7m2    Calcium  8.8 8.5 - 10.1 mg/dL    Bilirubin Total 0.4 0.2 - 1.3 mg/dL    Albumin 3.6 3.4 - 5.0 g/dL    Protein Total 7.4 6.8 - 8.8 g/dL    Alkaline Phosphatase 108 40 - 150 U/L     (H) 0 - 50 U/L    AST 88 (H) 0 - 45 U/L   UA reflex to Microscopic   Result Value Ref Range    Color Urine Yellow     Appearance Urine Cloudy     Glucose Urine Negative NEG mg/dL    Bilirubin Urine Negative NEG    Ketones Urine Negative NEG mg/dL    Specific Gravity Urine 1.015 1.003 - 1.035    Blood Urine Moderate (A) NEG    pH Urine 6.0 5.0 - 7.0 pH    Protein Albumin Urine 100 (A) NEG mg/dL    Urobilinogen mg/dL Normal 0.0 - 2.0 mg/dL    Nitrite Urine Negative NEG    Leukocyte Esterase Urine Large (A) NEG    Source Unspecified Urine     RBC Urine 70 (H) 0 - 2 /HPF    WBC Urine >182 (H) 0 - 2 /HPF    WBC Clumps Present (A) NEG /HPF    Squamous Epithelial /HPF Urine 5 (H) 0 - 1 /HPF    Transitional Epi 14 (H) 0 - 1 /HPF    Mucous Urine Present (A) NEG /LPF       Medications   lactated ringers BOLUS 1,000 mL (0 mLs Intravenous Stopped 3/23/17 1919)     Followed by   lactated ringers BOLUS 1,000 mL (1,000 mLs Intravenous New Bag 3/23/17 1919)   ketorolac (TORADOL) injection 15 mg (15 mg Intravenous Given 3/23/17 1819)   ciprofloxacin (CIPRO) tablet 500 mg (500 mg Oral Given 3/23/17 1920)       17:49 PM Patient assessed. Course of care outlined.     19:37: Patient reassessed.  Headache much improved after fluids and Toradol.  Lab results were reviewed.  Urinalysis shows evidence for     2010: Patient reassessed.  Continues to feel improved.  Feels ready for discharge.    Assessments & Plan (with Medical Decision Making)     51-year-old female presents with Salmonella enteritis confirmed by laboratory testing as discussed above.  She meets criteria for antibiotic treatment with far more than 10 stools per day.  In addition she has developed acute cystitis during the course of this diarrheal illness based on her symptoms and urine analysis.   Culture is pending.  For this reason we will initiate treatment with ciprofloxacin and she received a dose of oral Cipro 500 mg in the ED.  She was moderately dehydrated and mildly hypokalemic and received IV fluids in the emergency department as well as Toradol for headache with significant improvement in all of her symptoms.  I believe her hypokalemia will resolve with resumption of normal diet don't recommend potassium supplements.  We discussed dietary recommendations and avoidance of dairy products and using bland, starchy foods until she is improved.  She will continue on ciprofloxacin for a minimum of 3 days but discontinuous soon as she is clinically better.  We did discuss the cons of antibiotic therapy including antibiotic resistance, antibiotic side effects, allergy, and prolonged shedding of Salmonella, but we agreed that in the setting of 50 stools per day antibiotic therapy is indicated.  We discussed signs and symptoms that should prompt follow-up including development of abdominal pain, fevers, worsening diarrhea, vomiting, or other new symptoms of concern.  She is comfortable with this plan and her questions were all answered.    I have reviewed the nursing notes.    I have reviewed the findings, diagnosis, plan and need for follow up with the patient.    New Prescriptions    CIPROFLOXACIN (CIPRO) 500 MG TABLET    Take 1 tablet (500 mg) by mouth 2 times daily for 7 days       Final diagnoses:   Salmonella enteritidis   Acute cystitis without hematuria   Dehydration     This document serves as a record of the services and decisions personally performed and made by Anam Drummond MD. It was created on HIS/HER behalf by   Kusum Smart, a trained medical scribe. The creation of this document is based the provider's statements to the medical scribe.  Kusum Smart 5:46 PM 3/23/2017    Provider:   The information in this document, created by the medical scribe for me, accurately reflects the  services I personally performed and the decisions made by me. I have reviewed and approved this document for accuracy prior to leaving the patient care area.  Anam Drummond MD 5:46 PM 3/23/2017    3/23/2017   [facility] EMERGENCY DEPARTMENT     Anam Drummond MD  03/23/17 2018

## 2017-03-23 NOTE — LETTER
[facility] EMERGENCY DEPARTMENT  [address] [address]  [phone]    2017    Elizabeth Santzio  [address] [address]  [phone] (home) [phone] (work)    : 1965      To Whom it may concern:    Elizabeth Santizo was seen in our Emergency Department  and 2017.  Please excuse her from work  through 2017, due to an acute medical problem.    Sincerely,        Anam Drummond MD

## 2017-03-23 NOTE — ED AVS SNAPSHOT
[facility] Emergency Department    [address] [address]    Phone:  [phone]    Fax:  [phone]                                       Elizabeth Santizo   MRN: [id number]    Department:  [facility] Emergency Department   Date of Visit:  3/23/2017           After Visit Summary Signature Page     I have received my discharge instructions, and my questions have been answered. I have discussed any challenges I see with this plan with the nurse or doctor.    ..........................................................................................................................................  Patient/Patient Representative Signature      ..........................................................................................................................................  Patient Representative Print Name and Relationship to Patient    ..................................................               ................................................  Date                                            Time    ..........................................................................................................................................  Reviewed by Signature/Title    ...................................................              ..............................................  Date                                                            Time

## 2017-03-24 ENCOUNTER — CARE COORDINATION (OUTPATIENT)
Dept: CARE COORDINATION | Facility: CLINIC | Age: 52
End: 2017-03-24

## 2017-03-24 NOTE — LETTER
[facility]  [address] [address]  Phone: [phone]      March 24, 2017      Elizabeth Santizo  [address] [address] [address]    Dear Elizabeth,  I am the Clinic Care Coordinator that works with your primary care provider's clinic. I wanted to introduce myself and provide you with my contact information for you to be able to call me with any questions or concerns. Below is a description of what Clinic Care Coordination is and how I can further assist you.     The Clinic Care Coordinator role is a Registered Nurse and/or  who understands the health care system. The goal of Clinic Care Coordination is to help you manage your health and improve access to the [facility] system in the most efficient manner.  The Registered Nurse can assist you in meeting your health care goals by providing education, coordinating services, and strengthening the communication among your providers. The  can assist you with financial, behavioral, psychosocial, and chemical dependency and counseling/psychiatric resources.    Please feel free to keep this letter and contact information to contact me at [phone] with any further questions or concerns that may arise. We at [facility] are focused on providing you with the highest-quality healthcare experience possible and that all starts with you.       Sincerely,     Pamela Devlin    Enclosed: I have enclosed a copy of a 24 Hour Access Plan. This has helpful phone numbers for you to call when needed. Please keep this in an easy to access place to use as needed.

## 2017-03-24 NOTE — PROGRESS NOTES
Clinic Care Coordination Contact  Care Team Conversations    Clinical Data: RN CC called to f/u with pt after recent ED visits (please see ED provide notes for details). Pt reports diarrhea has decreases, x 2 today. Pt started taking Cipro for UTI. Still has a small amount of burning with urination but much improved from yesterday. Pt is tolerating abx. She is eating bland foods and increasing fluid intake.     New medications to review:  Reviewed Cipro    Clinical Pathway Documentation: NA    Goals/Progress (add to goals section): NA    Resources Given: no needs    Plan: Pt feels as if she is getting better. Apt scheduled for pt to f/u with PCP next week as she is concerned about what happens if the salmonella does not get out of her system. RN will mail out a letter to pt's home with RN CC contact information and explanation of CC services. Pt was encouraged to call with any questions or concerns.     Pamela Devlin RN-BSN  RN Clinic Care Coordinator  [facility]  [phone]

## 2017-03-24 NOTE — LETTER
Health Care Home - Access Care Plan    About Me  Patient Name:  Elizabeth Santizo    YOB: 1965  Age:                            51 year old   Erin MRN:         [id number] Telephone Information:     Home Phone [phone]   Mobile [phone]       Address:    [address] [place] [address] Email address:  [email or web address]      Emergency Contact(s)  Name Relationship Lgl Grd Work Phone Home Phone Mobile Phone   1. RENÉE SANTIZO Spouse   [phone]   2. SUSIE FALL* Son   [phone]              Health Maintenance:  Please follow up routinely with Primary Care Provider.     My Access Plan  Medical Emergency 919   Questions or concerns during clinic hours Primary Clinic Line, I will call the clinic directly:     24 Hour Appointment Line [phone] or  [phone])  (toll free)   24 Hour Nurse Line [phone] (toll free)   Questions or concerns outside clinic hours 24 Hour Appointment Line, I will call the after-hours on-call line:   [facility] [phone] or [phone] ([phone]) (toll-free)   Preferred Urgent Care     Preferred Hospital     Preferred Pharmacy [facility] [address] AT [address]     Behavioral Health Crisis Line [facility], [phone] or 911     My Care Team Members  Patient Care Team       Relationship Specialty Notifications Start End    Ilana Mares APRN CNP PCP - General Nurse Practitioner  9/16/16     Phone: [phone] Fax: [phone]         [facility] [address] [place] [address]        My Medical and Care Information  Problem List   Patient Active Problem List   Diagnosis     Hypertension goal BP (blood pressure) < 140/90     Hyperlipidemia with target LDL less than 130     Anxiety     Impaired fasting glucose      Current Medications and Allergies:  See printed Medication Report

## 2017-03-24 NOTE — DISCHARGE INSTRUCTIONS
Take ciprofloxacin 500 mg twice daily for a minimum of 3 days, but discontinue his soon as you are feeling better.  Return to be seen if you have significant bleeding, persistent diarrhea, recurrent vomiting, fevers, abdominal pain, or other new concerning symptoms.

## 2017-03-26 LAB
BACTERIA SPEC CULT: ABNORMAL
MICRO REPORT STATUS: ABNORMAL
MICROORGANISM SPEC CULT: ABNORMAL
SPECIMEN SOURCE: ABNORMAL

## 2017-06-22 ENCOUNTER — TELEPHONE (OUTPATIENT)
Dept: FAMILY MEDICINE | Facility: CLINIC | Age: 52
End: 2017-06-22

## 2017-06-22 NOTE — TELEPHONE ENCOUNTER
Panel Management Review          Composite cancer screening  Chart review shows that this patient is due/due soon for the following Pap Smear  Summary:    Patient is due/failing the following:   PAP    Action needed:   Patient needs office visit for physical / pap.    Type of outreach:    Sent letter.    Questions for provider review:    None                                                                                                                                    ,SREEKANTH RIVERS      .

## 2017-06-22 NOTE — LETTER
[facility]  [address] [address]  [phone]      June 22, 2017    Elizabeth Santizo  [address] [address]          Dear Elizabeth,    --Pap smear screening is recommended every 3 years. Some patients require more frequent Pap smears based on an individual assessment of other risk factors. Please call the clinic to schedule this in the near future. According to our records your last pap smear was 12/1/2013.    If you have had this test at an outside facility, please let us know so that we can update your record.     Please disregard this notice if you have already scheduled an appointment.     Sincerely,    Ilana JEROME  [facility] - [phone]  [email or web address]

## 2017-06-23 ENCOUNTER — VIRTUAL VISIT (OUTPATIENT)
Dept: FAMILY MEDICINE | Facility: OTHER | Age: 52
End: 2017-06-23

## 2017-06-23 NOTE — PROGRESS NOTES
"Date:   Clinician: Juvencio Hernandez  Clinician NPI: [id number]  Patient: Elizabeth Santizo  Patient : 1965  Patient Address: [address] [place] [address]  Patient Phone: [phone]  Visit Protocol: URI  Patient Summary:  Elizabeth is a 52 year old ( : 1965 ) female who initiated a Visit for a presumed sinus infection. When asked the question \"Please sign me up to receive news, health information and promotions from [facility].\", Elizabeth responded \"No\".     Her  symptoms started gradually 10-13 days ago  and consist of post-nasal drainage, malaise, chills, and cough.   She denies rhinitis, hoarse voice, nasal congestion, fever, loss of appetite, dysphagia, itchy eyes, myalgias, chest pain, ear pain, sore throat, dyspnea, nausea, and vomiting. She denies a history of facial surgery.   Her mild facial pain or pressure feels worse when bending over or leaning forward and is located on both sides of her head. The facial pain or pressure started after the onset of other URI symptoms.  She has teeth pain and is confident the tooth pain is not from a cavity, recent dental work or other mouth problems. Elizabeth has a moderate headache. The headache did not start before her other symptoms and is located on both sides of her head.   In the past year Elizabeth has been diagnosed with one (1) episodes of sinusitis. When asked to feel her neck she denied feeling enlarged lymph nodes. She denies axillary lymphadenopathy.   Her mild (a few coughs/hr) non-productive cough is NOT more bothersome at night. She believes the cough is caused by post-nasal drainage.   She denies rigors.   Elizabeth denies having COPD or other chronic lung disease.   Pulse: Not measured beats in 10 seconds.    Weight (in lbs): 122   She states she is not pregnant and denies breastfeeding. Her last period was over a month ago and her periods are typically irregular.   Elizabeth does not smoke or use smokeless tobacco.  MEDICATIONS:  Phenylephrine " (Sudafed PE), atenolol, ibuprofen (Advil, Motrin), and atorvastatin (Lipitor)  , ALLERGIES:   penicillin/amoxicillin/augmentin    Clinician Response:  Dear Elizabeth,  Based on the information you have provided, you likely have  acute sinusitis, otherwise known as a sinus infection.   I am prescribing Doxycycline Hyclate [Vibramycin] 100 mg. Take one tablet by mouth two times a day for 7 days. There are no refills with this prescription.   Drink plenty of liquids, especially water and take time to rest your body. This may mean taking a nap or going to bed earlier. Your body is fighting an infection and liquids and rest will improve the pace of recovery. Remember to regularly wash your hands and avoid close contact with others to prevent spreading your infection.   Some people develop allergies to antibiotics. If you notice a new rash, significant swelling or difficulty breathing, stop the medication immediately and go into a clinic for physical evaluation.   Some women may also develop a yeast infection as a side effect of taking antibiotics. If you notice symptoms of a yeast infection, please use [facility] to get treatment.   If you become pregnant during this course of treatment with medication, stop taking the medication and contact your primary care provider.   Diagnosis: Acute Sinusitis  Diagnosis ICD: J01.90  Prescription: doxycycline Hyclate (Vibramycin) 100mg oral tablet 14 tablets, 7 days supply. Take one tablet by mouth two times a day for 7 days. Refills: 0, Refill as needed: no, Allow substitutions: yes  Pharmacy: [id number] - [phone] - [address] [address]

## 2017-07-21 ENCOUNTER — OFFICE VISIT (OUTPATIENT)
Dept: FAMILY MEDICINE | Facility: CLINIC | Age: 52
End: 2017-07-21
Payer: COMMERCIAL

## 2017-07-21 VITALS
HEART RATE: 71 BPM | HEIGHT: 60 IN | WEIGHT: 132.5 LBS | DIASTOLIC BLOOD PRESSURE: 88 MMHG | TEMPERATURE: 97 F | BODY MASS INDEX: 26.01 KG/M2 | SYSTOLIC BLOOD PRESSURE: 122 MMHG

## 2017-07-21 DIAGNOSIS — J30.2 CHRONIC SEASONAL ALLERGIC RHINITIS, UNSPECIFIED TRIGGER: ICD-10-CM

## 2017-07-21 DIAGNOSIS — Z12.11 SPECIAL SCREENING FOR MALIGNANT NEOPLASMS, COLON: ICD-10-CM

## 2017-07-21 DIAGNOSIS — Z01.419 ENCOUNTER FOR GYNECOLOGICAL EXAMINATION WITHOUT ABNORMAL FINDING: Primary | ICD-10-CM

## 2017-07-21 DIAGNOSIS — R73.01 IMPAIRED FASTING GLUCOSE: ICD-10-CM

## 2017-07-21 DIAGNOSIS — E78.5 HYPERLIPIDEMIA WITH TARGET LDL LESS THAN 130: ICD-10-CM

## 2017-07-21 DIAGNOSIS — F41.9 ANXIETY: ICD-10-CM

## 2017-07-21 DIAGNOSIS — I10 ESSENTIAL HYPERTENSION WITH GOAL BLOOD PRESSURE LESS THAN 140/90: ICD-10-CM

## 2017-07-21 PROCEDURE — 99396 PREV VISIT EST AGE 40-64: CPT | Performed by: NURSE PRACTITIONER

## 2017-07-21 PROCEDURE — G0145 SCR C/V CYTO,THINLAYER,RESCR: HCPCS | Performed by: NURSE PRACTITIONER

## 2017-07-21 PROCEDURE — 87624 HPV HI-RISK TYP POOLED RSLT: CPT | Performed by: NURSE PRACTITIONER

## 2017-07-21 PROCEDURE — G0124 SCREEN C/V THIN LAYER BY MD: HCPCS | Performed by: NURSE PRACTITIONER

## 2017-07-21 RX ORDER — ATORVASTATIN CALCIUM 40 MG/1
40 TABLET, FILM COATED ORAL DAILY
Qty: 90 TABLET | Refills: 3 | Status: SHIPPED | OUTPATIENT
Start: 2017-07-21 | End: 2018-10-08

## 2017-07-21 RX ORDER — FLUTICASONE PROPIONATE 50 MCG
1 SPRAY, SUSPENSION (ML) NASAL DAILY
Qty: 1 BOTTLE | Refills: 11 | Status: SHIPPED | OUTPATIENT
Start: 2017-07-21 | End: 2019-06-18

## 2017-07-21 RX ORDER — ATENOLOL 25 MG/1
25 TABLET ORAL DAILY
Qty: 90 TABLET | Refills: 3 | Status: SHIPPED | OUTPATIENT
Start: 2017-07-21 | End: 2017-09-20

## 2017-07-21 NOTE — PROGRESS NOTES
SUBJECTIVE:   CC: Elizabeth Santizo is an 52 year old woman who presents for preventive health visit.     Healthy Habits:    Do you get at least three servings of calcium containing foods daily (dairy, green leafy vegetables, etc.)? no, taking calcium and/or vitamin D supplement: yes - Vitamin C     Amount of exercise or daily activities, outside of work: 0 day(s) per week    Problems taking medications regularly No    Medication side effects: No    Have you had an eye exam in the past two years? yes    Do you see a dentist twice per year? yes    Do you have sleep apnea, excessive snoring or daytime drowsiness?no        Today's PHQ-2 Score:   PHQ-2 ( 1999 Pf[employer]) 7/21/2017 6/30/2016   Q1: Little interest or pleasure in doing things 0 0   Q2: Feeling down, depressed or hopeless 0 0   PHQ-2 Score 0 0         Abuse: Current or Past(Physical, Sexual or Emotional)- No  Do you feel safe in your environment - Yes  Social History   Substance Use Topics     Smoking status: Former Smoker     Packs/day: 0.30     Years: 10.00     Quit date: 2/24/2010     Smokeless tobacco: Never Used     Alcohol use 2.0 oz/week      Comment: socially     The patient does not drink >3 drinks per day nor >7 drinks per week.    Reviewed orders with patient.  Reviewed health maintenance and updated orders accordingly - Yes          Pertinent mammograms are reviewed under the imaging tab.    History of abnormal Pap smear: YES - updated in Problem List and Health Maintenance accordingly. About 22 years ago - had cryo therapy. All normal since.    Reviewed and updated as needed this visit by clinical staff  Tobacco  Allergies  Med Hx  Surg Hx  Fam Hx  Soc Hx        Reviewed and updated as needed this visit by Provider            ROS:  C: NEGATIVE for fever, chills, change in weight  I: NEGATIVE for worrisome rashes, moles or lesions  E: NEGATIVE for vision changes or irritation  ENT: NEGATIVE for ear, mouth and throat problems  R: NEGATIVE  for significant cough or SOB  B: NEGATIVE for masses, tenderness or discharge  CV: NEGATIVE for chest pain, palpitations or peripheral edema  GI: NEGATIVE for nausea, abdominal pain, heartburn, or change in bowel habits  : NEGATIVE for unusual urinary or vaginal symptoms. Periods are irregular.  M: NEGATIVE for significant arthralgias or myalgia  N: NEGATIVE for weakness, dizziness or paresthesias  P: NEGATIVE for changes in mood or affect    OBJECTIVE:   /88  Pulse 71  Temp 97  F (36.1  C) (Tympanic)  Ht 5' (1.524 m)  Wt 132 lb 8 oz (60.1 kg)  Breastfeeding? No  BMI 25.88 kg/m2  EXAM:  GENERAL: healthy, alert and no distress  EYES: Eyes grossly normal to inspection, PERRL and conjunctivae and sclerae normal  HENT: ear canals and TM's normal, nose and mouth without ulcers or lesions  NECK: no adenopathy, no asymmetry, masses, or scars and thyroid normal to palpation  RESP: lungs clear to auscultation - no rales, rhonchi or wheezes  BREAST: normal without masses, tenderness or nipple discharge and no palpable axillary masses or adenopathy  CV: regular rate and rhythm, normal S1 S2, no S3 or S4, no murmur, click or rub, no peripheral edema and peripheral pulses strong  ABDOMEN: soft, nontender, no hepatosplenomegaly, no masses and bowel sounds normal   (female): normal female external genitalia, normal urethral meatus, vaginal mucosa pink, moist, well rugated, and normal cervix/adnexa/uterus without masses or discharge  MS: no gross musculoskeletal defects noted, no edema  SKIN: no suspicious lesions or rashes  NEURO: Normal strength and tone, mentation intact and speech normal  PSYCH: mentation appears normal, affect normal/bright    ASSESSMENT/PLAN:       ICD-10-CM    1. Encounter for gynecological examination without abnormal finding Z01.419 Pap imaged thin layer screen with HPV - recommended age 30 - 65 years (select HPV order below)     HPV High Risk Types DNA Cervical     *MA Screening Digital  Bilateral   2. Hyperlipidemia with target LDL less than 130 E78.5 atorvastatin (LIPITOR) 40 MG tablet     Lipid panel reflex to direct LDL   3. Chronic seasonal allergic rhinitis, unspecified trigger J30.2 fluticasone (FLONASE) 50 MCG/ACT spray   4. Essential hypertension with goal blood pressure less than 140/90 I10 atenolol (TENORMIN) 25 MG tablet     Basic metabolic panel   5. Impaired fasting glucose R73.01 Hemoglobin A1c   6. Special screening for malignant neoplasms, colon Z12.11 Fecal colorectal cancer screen (FIT)   7. Anxiety F41.9 Weaned herself off the Effexor - she states that her anxiety is fine off medications for now.       COUNSELING:   Reviewed preventive health counseling, as reflected in patient instructions         reports that she quit smoking about 7 years ago. She has a 3.00 pack-year smoking history. She has never used smokeless tobacco.    Estimated body mass index is 25.88 kg/(m^2) as calculated from the following:    Height as of this encounter: 5' (1.524 m).    Weight as of this encounter: 132 lb 8 oz (60.1 kg).   Weight management plan: Discussed healthy diet and exercise guidelines and patient will follow up in 12 months in clinic to re-evaluate.      The risks, benefits and treatment options of prescribed medications or other treatments have been discussed with the patient. The patient verbalized their understanding and should call or follow up if no improvement or if they develop further problems.    SOLANGE Huerta [facility]

## 2017-07-21 NOTE — NURSING NOTE
Initial /88  Pulse 71  Temp 97  F (36.1  C) (Tympanic)  Ht 5' (1.524 m)  Wt 132 lb 8 oz (60.1 kg)  Breastfeeding? No  BMI 25.88 kg/m2 Estimated body mass index is 25.88 kg/(m^2) as calculated from the following:    Height as of this encounter: 5' (1.524 m).    Weight as of this encounter: 132 lb 8 oz (60.1 kg). .    Lizette Godfrey CMA ([facility])

## 2017-07-21 NOTE — MR AVS SNAPSHOT
After Visit Summary   7/21/2017    Elizabeth Santizo    MRN: [id number]           Patient Information     Date Of Birth          1965        Visit Information        Provider Department      7/21/2017 8:20 AM Ilana Mares APRN [facility]        Today's Diagnoses     Encounter for gynecological examination without abnormal finding    -  1    Hyperlipidemia with target LDL less than 130        Chronic seasonal allergic rhinitis, unspecified trigger        Essential hypertension with goal blood pressure less than 140/90        Impaired fasting glucose        Special screening for malignant neoplasms, colon        Anxiety          Care Instructions    Mammogram due in Sept.    Return FIT test.      Preventive Health Recommendations  Female Ages 50 - 64    Yearly exam: See your health care provider every year in order to  o Review health changes.   o Discuss preventive care.    o Review your medicines if your doctor has prescribed any.      Get a Pap test every three years (unless you have an abnormal result and your provider advises testing more often).    If you get Pap tests with HPV test, you only need to test every 5 years, unless you have an abnormal result.     You do not need a Pap test if your uterus was removed (hysterectomy) and you have not had cancer.    You should be tested each year for STDs (sexually transmitted diseases) if you're at risk.     Have a mammogram every 1 to 2 years.    Have a colonoscopy at age 50, or have a yearly FIT test (stool test). These exams screen for colon cancer.      Have a cholesterol test every 5 years, or more often if advised.    Have a diabetes test (fasting glucose) every three years. If you are at risk for diabetes, you should have this test more often.     If you are at risk for osteoporosis (brittle bone disease), think about having a bone density scan (DEXA).    Shots: Get a flu shot each year. Get a tetanus shot  every 10 years.    Nutrition:     Eat at least 5 servings of fruits and vegetables each day.    Eat whole-grain bread, whole-wheat pasta and brown rice instead of white grains and rice.    Talk to your provider about Calcium and Vitamin D.     Lifestyle    Exercise at least 150 minutes a week (30 minutes a day, 5 days a week). This will help you control your weight and prevent disease.    Limit alcohol to one drink per day.    No smoking.     Wear sunscreen to prevent skin cancer.     See your dentist every six months for an exam and cleaning.    See your eye doctor every 1 to 2 years.            Follow-ups after your visit        Future tests that were ordered for you today     Open Future Orders        Priority Expected Expires Ordered    Lipid panel reflex to direct LDL Routine  7/21/2018 7/21/2017    Basic metabolic panel Routine  7/21/2018 7/21/2017    Hemoglobin A1c Routine  7/21/2018 7/21/2017    *MA Screening Digital Bilateral Routine  7/21/2018 7/21/2017    Fecal colorectal cancer screen (FIT) Routine 8/11/2017 10/13/2017 7/21/2017            Who to contact     If you have questions or need follow up information about today's clinic visit or your schedule please contact [facility] directly at [phone].  Normal or non-critical lab and imaging results will be communicated to you by [employer], letter or phone within 4 business days after the clinic has received the results. If you do not hear from us within 7 days, please contact the clinic through [employer] or phone. If you have a critical or abnormal lab result, we will notify you by phone as soon as possible.  Submit refill requests through [employer] or call your pharmacy and they will forward the refill request to us. Please allow 3 business days for your refill to be completed.          Additional Information About Your Visit        [employer] Information     [employer] gives you secure access to your electronic health record. If you see a primary  care provider, you can also send messages to your care team and make appointments. If you have questions, please call your primary care clinic.  If you do not have a primary care provider, please call [phone] and they will assist you.        Care EveryWhere ID     This is your Care EveryWhere ID. This could be used by other organizations to access your [place] medical records  [id number]        Your Vitals Were     Pulse Temperature Height Breastfeeding? BMI (Body Mass Index)       71 97  F (36.1  C) (Tympanic) 5' (1.524 m) No 25.88 kg/m2        Blood Pressure from Last 3 Encounters:   07/21/17 122/88   03/23/17 128/77   03/20/17 127/83    Weight from Last 3 Encounters:   07/21/17 132 lb 8 oz (60.1 kg)   09/26/16 133 lb (60.3 kg)   06/30/16 128 lb (58.1 kg)              We Performed the Following     HPV High Risk Types DNA Cervical     Pap imaged thin layer screen with HPV - recommended age 30 - 65 years (select HPV order below)          Where to get your medicines      [facility] [address]   [address] [facility] [address]     Phone:  [phone]     atenolol 25 MG tablet    atorvastatin 40 MG tablet    fluticasone 50 MCG/ACT spray          Primary Care Provider Office Phone # Fax #    Ilana Hendrix Sintia Mares, SOLANGE [facility] [phone]       [facility] [address] [place] [address]        Equal Access to Services     JOSELO WHITFIELD : Hadmelia zurita Sonena, waaxda luqadaha, qaybta kaalmada cha, ledy jaime. So [facility] [phone].    ATENCIÓN: Si habla español, tiene a jimenez disposición servicios gratuitos de asistencia lingüística. Llame al [phone].    We comply with applicable federal civil rights laws and [place] laws. We do not discriminate on the basis of race, color, national origin, age, disability sex, sexual orientation or gender identity.             Thank you!     Thank you for choosing [facility]  for your care. Our goal is always to provide you with excellent care. Hearing back from our patients is one way we can continue to improve our services. Please take a few minutes to complete the written survey that you may receive in the mail after your visit with us. Thank you!             Your Updated Medication List - Protect others around you: Learn how to safely use, store and throw away your medicines at www.disposemymeds.org.          This list is accurate as of: 7/21/17  8:38 AM.  Always use your most recent med list.                   Brand Name Dispense Instructions for use Diagnosis    atenolol 25 MG tablet    TENORMIN    90 tablet    Take 1 tablet (25 mg) by mouth daily    Essential hypertension with goal blood pressure less than 140/90       atorvastatin 40 MG tablet    LIPITOR    90 tablet    Take 1 tablet (40 mg) by mouth daily    Hyperlipidemia with target LDL less than 130       fluticasone 50 MCG/ACT spray    FLONASE    1 Bottle    Spray 1 spray into both nostrils daily    Chronic seasonal allergic rhinitis, unspecified trigger       OMEGA 3 PO      Take 2,400 mg by mouth daily        VITAMIN C PO      Take 500 mg by mouth daily

## 2017-07-21 NOTE — PATIENT INSTRUCTIONS
Mammogram due in Sept.    Return FIT test.      Preventive Health Recommendations  Female Ages 50 - 64    Yearly exam: See your health care provider every year in order to  o Review health changes.   o Discuss preventive care.    o Review your medicines if your doctor has prescribed any.      Get a Pap test every three years (unless you have an abnormal result and your provider advises testing more often).    If you get Pap tests with HPV test, you only need to test every 5 years, unless you have an abnormal result.     You do not need a Pap test if your uterus was removed (hysterectomy) and you have not had cancer.    You should be tested each year for STDs (sexually transmitted diseases) if you're at risk.     Have a mammogram every 1 to 2 years.    Have a colonoscopy at age 50, or have a yearly FIT test (stool test). These exams screen for colon cancer.      Have a cholesterol test every 5 years, or more often if advised.    Have a diabetes test (fasting glucose) every three years. If you are at risk for diabetes, you should have this test more often.     If you are at risk for osteoporosis (brittle bone disease), think about having a bone density scan (DEXA).    Shots: Get a flu shot each year. Get a tetanus shot every 10 years.    Nutrition:     Eat at least 5 servings of fruits and vegetables each day.    Eat whole-grain bread, whole-wheat pasta and brown rice instead of white grains and rice.    Talk to your provider about Calcium and Vitamin D.     Lifestyle    Exercise at least 150 minutes a week (30 minutes a day, 5 days a week). This will help you control your weight and prevent disease.    Limit alcohol to one drink per day.    No smoking.     Wear sunscreen to prevent skin cancer.     See your dentist every six months for an exam and cleaning.    See your eye doctor every 1 to 2 years.

## 2017-07-21 NOTE — LETTER
July 31, 2017    Elizabeth Santizo  [address]  [place] [address]    Dear Elizabeth,  We are happy to inform you that your PAP smear result from 7/21/17 is normal.  The presence of endometrial cells was possible seen on your current pap smear.  This is most likely due to your menstrual cycle.  No follow up is recommended unless you have spotting/bleeding in between your cycles.  If this occurs, please contact the clinic for a follow up appointment.  We are now able to do a follow up test on PAP smears. The DNA test is for HPV (Human Papilloma Virus). Cervical cancer is closely linked with certain types of HPV. Your result showed no evidence of high risk HPV.  Therefore we recommend you return in 3 years for your next pap smear and HPV test.  You will still need to return to the clinic every year for an annual exam and other preventive tests.  Please contact the clinic at [phone] with any questions.  Sincerely,    SOLANGE Huerta CNP/rlm

## 2017-07-28 LAB
FINAL DIAGNOSIS: NORMAL
HPV HR 12 DNA CVX QL NAA+PROBE: NEGATIVE
HPV16 DNA SPEC QL NAA+PROBE: NEGATIVE
HPV18 DNA SPEC QL NAA+PROBE: NEGATIVE
SPECIMEN DESCRIPTION: NORMAL

## 2017-08-07 LAB
COPATH REPORT: NORMAL
PAP: NORMAL

## 2017-08-22 ENCOUNTER — OFFICE VISIT (OUTPATIENT)
Dept: FAMILY MEDICINE | Facility: CLINIC | Age: 52
End: 2017-08-22
Payer: COMMERCIAL

## 2017-08-22 VITALS
SYSTOLIC BLOOD PRESSURE: 118 MMHG | DIASTOLIC BLOOD PRESSURE: 78 MMHG | TEMPERATURE: 98.3 F | HEART RATE: 62 BPM | HEIGHT: 60 IN

## 2017-08-22 DIAGNOSIS — H61.21 IMPACTED CERUMEN OF RIGHT EAR: ICD-10-CM

## 2017-08-22 DIAGNOSIS — F41.9 ANXIETY: Primary | ICD-10-CM

## 2017-08-22 DIAGNOSIS — R63.5 WEIGHT GAIN: ICD-10-CM

## 2017-08-22 DIAGNOSIS — Z23 NEED FOR TDAP VACCINATION: ICD-10-CM

## 2017-08-22 PROCEDURE — 99214 OFFICE O/P EST MOD 30 MIN: CPT | Mod: 25 | Performed by: NURSE PRACTITIONER

## 2017-08-22 PROCEDURE — 90471 IMMUNIZATION ADMIN: CPT | Performed by: NURSE PRACTITIONER

## 2017-08-22 PROCEDURE — 90715 TDAP VACCINE 7 YRS/> IM: CPT | Performed by: NURSE PRACTITIONER

## 2017-08-22 RX ORDER — HYDROXYZINE HYDROCHLORIDE 25 MG/1
25-50 TABLET, FILM COATED ORAL EVERY 6 HOURS PRN
Qty: 60 TABLET | Refills: 1 | Status: SHIPPED | OUTPATIENT
Start: 2017-08-22 | End: 2017-10-16

## 2017-08-22 ASSESSMENT — PATIENT HEALTH QUESTIONNAIRE - PHQ9: SUM OF ALL RESPONSES TO PHQ QUESTIONS 1-9: 5

## 2017-08-22 NOTE — PATIENT INSTRUCTIONS
Thank you for choosing [facility].  You may be receiving a survey in the mail from Shamika Aaron regarding your visit today.  Please take a few minutes to complete and return the survey to let us know how we are doing.      If you have questions or concerns, please contact us via [employer] or you can contact your care team at [phone].    Our Clinic hours are:  Monday 6:40 am  to 7:00 pm  Tuesday -Friday 6:40 am to 5:00 pm    The [place] outpatient lab hours are:  Monday - Friday 6:10 am to 4:45 pm  Saturdays 7:00 am to 11:00 am  Appointments are required, call [phone]    If you have clinical questions after hours or would like to schedule an appointment,  call the clinic at [phone].

## 2017-08-22 NOTE — NURSING NOTE
Chief Complaint   Patient presents with     Anxiety     Anxiety Follow Up. She discontinued Effexor in May due to side effects, she wants to discuss getting a rx for something to take as needed      Ear Problem     Right Ear, hard time hearing out of it .      Health Maintenance     Patient reminded due for FIT test- has at home.      LAB REQUEST     patient wants a thyroid lab added to labs in chart. She will be coming in soon for these.      Imm/Inj     TDAP today        Initial /78 (BP Location: Left arm, Patient Position: Chair, Cuff Size: Adult Regular)  Pulse 62  Temp 98.3  F (36.8  C) (Tympanic)  Ht 5' (1.524 m) Estimated body mass index is 25.88 kg/(m^2) as calculated from the following:    Height as of 7/21/17: 5' (1.524 m).    Weight as of 7/21/17: 132 lb 8 oz (60.1 kg).  Medication Reconciliation: complete

## 2017-08-22 NOTE — PROGRESS NOTES
SUBJECTIVE:   Elizabeth Santizo is a 52 year old female who presents to clinic today for the following health issues:    Chief Complaint   Patient presents with     Anxiety     Anxiety Follow Up. She discontinued Effexor in May due to side effects, she wants to discuss getting a rx for something to take as needed      Ear Problem     Right Ear, hard time hearing out of it .      Health Maintenance     Patient reminded due for FIT test- has at home.      LAB REQUEST     patient wants a thyroid lab added to labs in chart. She will be coming in soon for these.   Wants thyroid checked due to weight gain.     Imm/Inj     TDAP today        Anxiety Follow-Up    Status since last visit: Worsened     Doesn't want to take something daily    Wants to take something as needed - states that she should need something 1-2 times per week.    Other associated symptoms: Not sleeping well at night     Complicating factors:   Significant life event: No   Current substance abuse: None  Depression symptoms: No  PATRICIA-7 SCORE 9/26/2016 1/9/2017   Total Score 19 0         Amount of exercise or physical activity: None    Problems taking medications regularly: No    Medication side effects: Stopped taking Effexor in May due to it causing increase in Anger    Diet: regular (no restrictions)        ENT Symptoms             Symptoms: cc Present Absent Comment   Fever/Chills   X    Fatigue   X    Muscle Aches   X    Eye Irritation   X    Sneezing   X    Nasal Justin/Drg   X    Sinus Pressure/Pain   X    Loss of smell   X    Dental pain   X    Sore Throat   X    Swollen Glands   X    Ear Pain/Fullness X X  Right Ear Plugged, Hard Time Hearing    Cough   X    Wheeze   X    Chest Pain   X    Shortness of breath   X    Rash   X    Other   X      Symptom duration:  x 6 months to 1 year    Symptom severity:  sever   Treatments tried:  Debrox- no difference in hearing    Contacts:  none          Problem list and histories reviewed & adjusted, as  indicated.  Additional history: as documented      Reviewed and updated as needed this visit by clinical staff  Tobacco  Allergies  Meds  Med Hx  Surg Hx  Fam Hx  Soc Hx      Reviewed and updated as needed this visit by Provider         ROS:  Constitutional, HEENT, cardiovascular, pulmonary, gi and gu systems are negative, except as otherwise noted.      OBJECTIVE:   /78 (BP Location: Left arm, Patient Position: Chair, Cuff Size: Adult Regular)  Pulse 62  Temp 98.3  F (36.8  C) (Tympanic)  Ht 5' (1.524 m)  There is no height or weight on file to calculate BMI.  GENERAL: healthy, alert and no distress  HENT: right ear: canal obstructed with impacted cerumen - cleared after irrigation by CMA.  PSYCH: mentation appears normal, affect normal/bright        ASSESSMENT/PLAN:       ICD-10-CM    1. Anxiety F41.9 Patient wants a prn.  May use hydrOXYzine (ATARAX) 25 MG tablet 1-2 tabs every 6 hours as needed for anxiety.  Follow up as needed.     2. Weight gain R63.5 **TSH with free T4 reflex FUTURE anytime     3. Impacted cerumen of right ear H61.21 Cleared after irrigation by CMA     4. Need for Tdap vaccination Z23 TDAP VACCINE (ADACEL)     ADMIN 1st VACCINE       The risks, benefits and treatment options of prescribed medications or other treatments have been discussed with the patient. The patient verbalized their understanding and should call or follow up if no improvement or if they develop further problems.    SOLANGE Huerta [facility]

## 2017-08-22 NOTE — MR AVS SNAPSHOT
After Visit Summary   8/22/2017    Elizabeth Santizo    MRN: [id number]           Patient Information     Date Of Birth          1965        Visit Information        Provider Department      8/22/2017 8:00 AM Ilana Mares APRN [facility]        Today's Diagnoses     Anxiety    -  1    Weight gain        Impacted cerumen of right ear        Need for Tdap vaccination          Care Instructions          Thank you for choosing [facility].  You may be receiving a survey in the mail from Shamika Aaron regarding your visit today.  Please take a few minutes to complete and return the survey to let us know how we are doing.      If you have questions or concerns, please contact us via [employer] or you can contact your care team at [phone].    Our Clinic hours are:  Monday 6:40 am  to 7:00 pm  Tuesday -Friday 6:40 am to 5:00 pm    The [place] outpatient lab hours are:  Monday - Friday 6:10 am to 4:45 pm  Saturdays 7:00 am to 11:00 am  Appointments are required, call [phone]    If you have clinical questions after hours or would like to schedule an appointment,  call the clinic at [phone].            Follow-ups after your visit        Your next 10 appointments already scheduled     Aug 23, 2017  6:15 AM CDT   LAB with [facility] ([facility])    [address] [address]   [phone]           Patient must bring picture ID. Patient should be prepared to give a urine specimen  Please do not eat 10-12 hours before your appointment if you are coming in fasting for labs on lipids, cholesterol, or glucose (sugar). Pregnant women should follow their Care Team instructions. Water with medications is okay. Do not drink coffee or other fluids. If you have concerns about taking  your medications, please ask at office or if scheduling via [employer], send a message by clicking on Secure Messaging, Message Your  Care Team.              Future tests that were ordered for you today     Open Future Orders        Priority Expected Expires Ordered    **TSH with free T4 reflex FUTURE anytime Routine 8/22/2017 8/22/2018 8/22/2017            Who to contact     If you have questions or need follow up information about today's clinic visit or your schedule please contact [facility] directly at [phone].  Normal or non-critical lab and imaging results will be communicated to you by [employer], letter or phone within 4 business days after the clinic has received the results. If you do not hear from us within 7 days, please contact the clinic through [employer] or phone. If you have a critical or abnormal lab result, we will notify you by phone as soon as possible.  Submit refill requests through [employer] or call your pharmacy and they will forward the refill request to us. Please allow 3 business days for your refill to be completed.          Additional Information About Your Visit        SOLOMO365harYunait Information     [employer] gives you secure access to your electronic health record. If you see a primary care provider, you can also send messages to your care team and make appointments. If you have questions, please call your primary care clinic.  If you do not have a primary care provider, please call [phone] and they will assist you.        Care EveryWhere ID     This is your Care EveryWhere ID. This could be used by other organizations to access your [place] medical records  [id number]        Your Vitals Were     Pulse Temperature Height             62 98.3  F (36.8  C) (Tympanic) 5' (1.524 m)          Blood Pressure from Last 3 Encounters:   08/22/17 118/78   07/21/17 122/88   03/23/17 128/77    Weight from Last 3 Encounters:   07/21/17 132 lb 8 oz (60.1 kg)   09/26/16 133 lb (60.3 kg)   06/30/16 128 lb (58.1 kg)              We Performed the Following     ADMIN 1st VACCINE     TDAP VACCINE (ADACEL)          Today's  Medication Changes          These changes are accurate as of: 8/22/17  9:25 AM.  If you have any questions, ask your nurse or doctor.               Start taking these medicines.        Dose/Directions    hydrOXYzine 25 MG tablet   Commonly known as:  ATARAX   Used for:  Anxiety   Started by:  Ilana Mares APRN CNP        Dose:  25-50 mg   Take 1-2 tablets (25-50 mg) by mouth every 6 hours as needed for anxiety   Quantity:  60 tablet   Refills:  1            Where to get your medicines      These medications were sent to [id number] - [place], [place] - [address]   [address] [facility] [address]     Phone:  [phone]     hydrOXYzine 25 MG tablet                Primary Care Provider Office Phone # Fax #    SOLANGE Oliver [phone] [address] [address]        Equal Access to Services     JOSELO WHITFIELD : Hadii aad ku hadasho Soomaali, waaxda luqadaha, qaybta kaalmada adeegyada, ledy shipman . So [facility] [phone].    ATENCIÓN: Si habla español, tiene a jimenez disposición servicios gratuitos de asistencia lingüística. Llame al [phone].    We comply with applicable federal civil rights laws and [place] laws. We do not discriminate on the basis of race, color, national origin, age, disability sex, sexual orientation or gender identity.            Thank you!     Thank you for choosing [facility]  for your care. Our goal is always to provide you with excellent care. Hearing back from our patients is one way we can continue to improve our services. Please take a few minutes to complete the written survey that you may receive in the mail after your visit with us. Thank you!             Your Updated Medication List - Protect others around you: Learn how to safely use, store and throw away your medicines at www.disposemymeds.org.          This list is accurate as of: 8/22/17  9:25  AM.  Always use your most recent med list.                   Brand Name Dispense Instructions for use Diagnosis    atenolol 25 MG tablet    TENORMIN    90 tablet    Take 1 tablet (25 mg) by mouth daily    Essential hypertension with goal blood pressure less than 140/90       atorvastatin 40 MG tablet    LIPITOR    90 tablet    Take 1 tablet (40 mg) by mouth daily    Hyperlipidemia with target LDL less than 130       fluticasone 50 MCG/ACT spray    FLONASE    1 Bottle    Spray 1 spray into both nostrils daily    Chronic seasonal allergic rhinitis, unspecified trigger       hydrOXYzine 25 MG tablet    ATARAX    60 tablet    Take 1-2 tablets (25-50 mg) by mouth every 6 hours as needed for anxiety    Anxiety       OMEGA 3 PO      Take 2,400 mg by mouth daily        VITAMIN C PO      Take 500 mg by mouth daily

## 2017-08-23 ENCOUNTER — TELEPHONE (OUTPATIENT)
Dept: FAMILY MEDICINE | Facility: CLINIC | Age: 52
End: 2017-08-23

## 2017-08-23 DIAGNOSIS — F41.9 ANXIETY: Primary | ICD-10-CM

## 2017-08-23 NOTE — TELEPHONE ENCOUNTER
At this point I would recommend that she consult with Augustina Bashir for medication for her anxiety.  Ilana Mares, CNP

## 2017-08-23 NOTE — TELEPHONE ENCOUNTER
S-(situation): Patient calling to update on hydroxyzine.    B-(background): Patient was seen in clinic yesterday and prescribed hydroxyzine to take prn for anxiety.  Patient took one dose yesterday afternoon and got very groggy, irritable, and couldn't focus.  She was still trying to work, but couldn't.  Patient went to bed at 7pm and was out for the night.  Today patient still feels a little foggy and has a mild headache.    A-(assessment): Patient states that hydroxyzine is not going to work for her.    R-(recommendations): Patient advised to try taking a half a pill, but patient states that they are too small to cut in half.  Please advise.    Analy Andrea RN

## 2017-08-23 NOTE — TELEPHONE ENCOUNTER
"Reason for Call:  Other call back    Detailed comments: Patient is calling stating that she took her first dose of her new Anxiety Medication Hydroxazine last night, and she said it had her \"down for the count\", can't focus, she does not like this medication.    Phone Number Patient can be reached at: Other phone number:  [phone] this is the number she gave, but does not match any in her demographics, just an FYI    Best Time: any    Can we leave a detailed message on this number? YES   Chandrika Carranza  [facility] [place] [facility]      Call taken on 8/23/2017 at 11:35 AM by Chandrika Carranza      "

## 2017-08-29 ENCOUNTER — OFFICE VISIT (OUTPATIENT)
Dept: FAMILY MEDICINE | Facility: CLINIC | Age: 52
End: 2017-08-29
Payer: COMMERCIAL

## 2017-08-29 VITALS
SYSTOLIC BLOOD PRESSURE: 112 MMHG | BODY MASS INDEX: 26.5 KG/M2 | HEART RATE: 66 BPM | HEIGHT: 60 IN | DIASTOLIC BLOOD PRESSURE: 75 MMHG | TEMPERATURE: 96.6 F | WEIGHT: 135 LBS

## 2017-08-29 DIAGNOSIS — R73.01 IMPAIRED FASTING GLUCOSE: ICD-10-CM

## 2017-08-29 DIAGNOSIS — R63.5 WEIGHT GAIN: ICD-10-CM

## 2017-08-29 DIAGNOSIS — R82.90 NONSPECIFIC FINDING ON EXAMINATION OF URINE: Primary | ICD-10-CM

## 2017-08-29 DIAGNOSIS — E78.5 HYPERLIPIDEMIA WITH TARGET LDL LESS THAN 130: ICD-10-CM

## 2017-08-29 DIAGNOSIS — I10 ESSENTIAL HYPERTENSION WITH GOAL BLOOD PRESSURE LESS THAN 140/90: ICD-10-CM

## 2017-08-29 DIAGNOSIS — N30.01 ACUTE CYSTITIS WITH HEMATURIA: Primary | ICD-10-CM

## 2017-08-29 LAB
ALBUMIN UR-MCNC: NEGATIVE MG/DL
ANION GAP SERPL CALCULATED.3IONS-SCNC: 6 MMOL/L (ref 3–14)
APPEARANCE UR: ABNORMAL
BACTERIA #/AREA URNS HPF: ABNORMAL /HPF
BACTERIA SPEC CULT: NORMAL
BILIRUB UR QL STRIP: NEGATIVE
BUN SERPL-MCNC: 12 MG/DL (ref 7–30)
CALCIUM SERPL-MCNC: 8.5 MG/DL (ref 8.5–10.1)
CHLORIDE SERPL-SCNC: 109 MMOL/L (ref 94–109)
CHOLEST SERPL-MCNC: 171 MG/DL
CO2 SERPL-SCNC: 27 MMOL/L (ref 20–32)
COLOR UR AUTO: YELLOW
CREAT SERPL-MCNC: 0.63 MG/DL (ref 0.52–1.04)
GFR SERPL CREATININE-BSD FRML MDRD: >90 ML/MIN/1.7M2
GLUCOSE SERPL-MCNC: 105 MG/DL (ref 70–99)
GLUCOSE UR STRIP-MCNC: NEGATIVE MG/DL
HBA1C MFR BLD: 5.4 % (ref 4.3–6)
HDLC SERPL-MCNC: 53 MG/DL
HGB UR QL STRIP: ABNORMAL
KETONES UR STRIP-MCNC: NEGATIVE MG/DL
LDLC SERPL CALC-MCNC: 67 MG/DL
LEUKOCYTE ESTERASE UR QL STRIP: ABNORMAL
NITRATE UR QL: NEGATIVE
NON-SQ EPI CELLS #/AREA URNS LPF: ABNORMAL /LPF
NONHDLC SERPL-MCNC: 118 MG/DL
PH UR STRIP: 6 PH (ref 5–7)
POTASSIUM SERPL-SCNC: 4.2 MMOL/L (ref 3.4–5.3)
RBC #/AREA URNS AUTO: ABNORMAL /HPF
SODIUM SERPL-SCNC: 142 MMOL/L (ref 133–144)
SOURCE: ABNORMAL
SP GR UR STRIP: 1.02 (ref 1–1.03)
SPECIMEN SOURCE: NORMAL
TRIGL SERPL-MCNC: 253 MG/DL
TSH SERPL DL<=0.005 MIU/L-ACNC: 2.11 MU/L (ref 0.4–4)
UROBILINOGEN UR STRIP-ACNC: 0.2 EU/DL (ref 0.2–1)
WBC #/AREA URNS AUTO: ABNORMAL /HPF

## 2017-08-29 PROCEDURE — 99213 OFFICE O/P EST LOW 20 MIN: CPT | Performed by: NURSE PRACTITIONER

## 2017-08-29 PROCEDURE — 80061 LIPID PANEL: CPT | Performed by: NURSE PRACTITIONER

## 2017-08-29 PROCEDURE — 80048 BASIC METABOLIC PNL TOTAL CA: CPT | Performed by: NURSE PRACTITIONER

## 2017-08-29 PROCEDURE — 83036 HEMOGLOBIN GLYCOSYLATED A1C: CPT | Performed by: NURSE PRACTITIONER

## 2017-08-29 PROCEDURE — 84443 ASSAY THYROID STIM HORMONE: CPT | Performed by: NURSE PRACTITIONER

## 2017-08-29 PROCEDURE — 36415 COLL VENOUS BLD VENIPUNCTURE: CPT | Performed by: NURSE PRACTITIONER

## 2017-08-29 PROCEDURE — 87086 URINE CULTURE/COLONY COUNT: CPT | Performed by: NURSE PRACTITIONER

## 2017-08-29 PROCEDURE — 87088 URINE BACTERIA CULTURE: CPT | Performed by: NURSE PRACTITIONER

## 2017-08-29 PROCEDURE — 81001 URINALYSIS AUTO W/SCOPE: CPT | Performed by: NURSE PRACTITIONER

## 2017-08-29 PROCEDURE — 87186 SC STD MICRODIL/AGAR DIL: CPT | Performed by: NURSE PRACTITIONER

## 2017-08-29 RX ORDER — CIPROFLOXACIN 500 MG/1
500 TABLET, FILM COATED ORAL 2 TIMES DAILY
Qty: 10 TABLET | Refills: 0 | Status: SHIPPED | OUTPATIENT
Start: 2017-08-29 | End: 2017-09-03

## 2017-08-29 NOTE — LETTER
August 29, 2017      Elizabeth Santizo  [address] [address]        Dear ,    We are writing to inform you of your test results.    Thyroid is normal.   Cholesterol is good, except your triglycerides are high. Aerobic exercise will help lower this, as well as weight loss. You should follow a low fat diet, limiting refined sugars, fruit juices, and high fructose beverages; we suggest increased consumption of fish that contain high amounts of omega-3 fatty acids.     Kidney function is normal.   Electrolytes are normal.     Blood sugar is mildly elevated; diabetes test (A1c) is normal.     Recheck labs in one year.       If you have any questions or concerns, please call the clinic at the number listed above.       Sincerely,        Ilana Mares NP

## 2017-08-29 NOTE — PATIENT INSTRUCTIONS
Cipro 500 mg twice daily for 5 days  Drink more fluids      Thank you for choosing [facility].  You may be receiving a survey in the mail from [employer] regarding your visit today.  Please take a few minutes to complete and return the survey to let us know how we are doing.      If you have questions or concerns, please contact us via [employer] or you can contact your care team at [phone].    Our Clinic hours are:  Monday 6:40 am  to 7:00 pm  Tuesday -Friday 6:40 am to 5:00 pm    The [place] outpatient lab hours are:  Monday - Friday 6:10 am to 4:45 pm  Saturdays 7:00 am to 11:00 am  Appointments are required, call [phone]    If you have clinical questions after hours or would like to schedule an appointment,  call the clinic at [phone].

## 2017-08-29 NOTE — NURSING NOTE
Chief Complaint   Patient presents with     UTI       Initial /75 (BP Location: Right arm, Patient Position: Chair, Cuff Size: Adult Regular)  Pulse 66  Temp 96.6  F (35.9  C) (Tympanic)  Ht 5' (1.524 m)  Wt 135 lb (61.2 kg)  BMI 26.37 kg/m2 Estimated body mass index is 26.37 kg/(m^2) as calculated from the following:    Height as of this encounter: 5' (1.524 m).    Weight as of this encounter: 135 lb (61.2 kg).  Medication Reconciliation: complete

## 2017-08-29 NOTE — MR AVS SNAPSHOT
After Visit Summary   8/29/2017    Elizabeth Santizo    MRN: [id number]           Patient Information     Date Of Birth          1965        Visit Information        Provider Department      8/29/2017 7:20 AM Delmy Wooten APRN [facility]        Today's Diagnoses     Symptoms involving urinary system    -  1      Care Instructions      Cipro 500 mg twice daily for 5 days  Drink more fluids      Thank you for choosing [facility].  You may be receiving a survey in the mail from [employer]ika [employer] regarding your visit today.  Please take a few minutes to complete and return the survey to let us know how we are doing.      If you have questions or concerns, please contact us via [email or web address] or you can contact your care team at [phone].    Our Clinic hours are:  Monday 6:40 am  to 7:00 pm  Tuesday -Friday 6:40 am to 5:00 pm    The [place] outpatient lab hours are:  Monday - Friday 6:10 am to 4:45 pm  Saturdays 7:00 am to 11:00 am  Appointments are required, call [phone]    If you have clinical questions after hours or would like to schedule an appointment,  call the clinic at [phone].            Follow-ups after your visit        Your next 10 appointments already scheduled     Sep 08, 2017  8:15 AM CDT   New Visit with SOLANGE [facility]jo [facility] ([facility])    [address] [address]   [phone]              Who to contact     If you have questions or need follow up information about today's clinic visit or your schedule please contact [facility] directly at [phone].  Normal or non-critical lab and imaging results will be communicated to you by MyChart, letter or phone within 4 business days after the clinic has received the results. If you do not hear from us within 7 days, please contact the clinic through [employer] or phone. If you have a critical or abnormal lab  result, we will notify you by phone as soon as possible.  Submit refill requests through [employer] or call your pharmacy and they will forward the refill request to us. Please allow 3 business days for your refill to be completed.          Additional Information About Your Visit        Redu.ushart Information     [employer] gives you secure access to your electronic health record. If you see a primary care provider, you can also send messages to your care team and make appointments. If you have questions, please call your primary care clinic.  If you do not have a primary care provider, please call [phone] and they will assist you.        Care EveryWhere ID     This is your Care EveryWhere ID. This could be used by other organizations to access your [place] medical records  [id number]        Your Vitals Were     Pulse Temperature Height BMI (Body Mass Index)          66 96.6  F (35.9  C) (Tympanic) 5' (1.524 m) 26.37 kg/m2         Blood Pressure from Last 3 Encounters:   08/29/17 112/75   08/22/17 118/78   07/21/17 122/88    Weight from Last 3 Encounters:   08/29/17 135 lb (61.2 kg)   07/21/17 132 lb 8 oz (60.1 kg)   09/26/16 133 lb (60.3 kg)              We Performed the Following     *UA reflex to Microscopic and Culture ([place] and [facility] (except [place] and [place])     Urine Microscopic          Today's Medication Changes          These changes are accurate as of: 8/29/17  7:37 AM.  If you have any questions, ask your nurse or doctor.               Start taking these medicines.        Dose/Directions    ciprofloxacin 500 MG tablet   Commonly known as:  CIPRO   Used for:  Symptoms involving urinary system   Started by:  Delmy Wooten APRN CNP        Dose:  500 mg   Take 1 tablet (500 mg) by mouth 2 times daily for 5 days   Quantity:  10 tablet   Refills:  0            Where to get your medicines      These medications were sent to [id number] - [place], [place] - [address] [place] [address] [address]    [address] [facility] [address]     Phone:  [phone]     ciprofloxacin 500 MG tablet                Primary Care Provider Office Phone # Fax #    SOLANGE Oliver [employer] [phone] [address] [address]        Equal Access to Services     JOSELO WHITFIELD : Hadii aad ku hadasho Soomaali, waaxda luqadaha, qaybta kaalmada adeegyada, waxay idiin hayaan adeeg kharash la'tracyn . So [facility] [phone].    ATENCIÓN: Si habla español, tiene a jimenez disposición servicios gratuitos de asistencia lingüística. Chelle al [phone].    We comply with applicable federal civil rights laws and [place] laws. We do not discriminate on the basis of race, color, national origin, age, disability sex, sexual orientation or gender identity.            Thank you!     Thank you for choosing [facility]  for your care. Our goal is always to provide you with excellent care. Hearing back from our patients is one way we can continue to improve our services. Please take a few minutes to complete the written survey that you may receive in the mail after your visit with us. Thank you!             Your Updated Medication List - Protect others around you: Learn how to safely use, store and throw away your medicines at www.disposemymeds.org.          This list is accurate as of: 8/29/17  7:37 AM.  Always use your most recent med list.                   Brand Name Dispense Instructions for use Diagnosis    atenolol 25 MG tablet    TENORMIN    90 tablet    Take 1 tablet (25 mg) by mouth daily    Essential hypertension with goal blood pressure less than 140/90       atorvastatin 40 MG tablet    LIPITOR    90 tablet    Take 1 tablet (40 mg) by mouth daily    Hyperlipidemia with target LDL less than 130       ciprofloxacin 500 MG tablet    CIPRO    10 tablet    Take 1 tablet (500 mg) by mouth 2 times daily for 5 days    Symptoms involving urinary system       fluticasone 50 MCG/ACT  spray    FLONASE    1 Bottle    Spray 1 spray into both nostrils daily    Chronic seasonal allergic rhinitis, unspecified trigger       hydrOXYzine 25 MG tablet    ATARAX    60 tablet    Take 1-2 tablets (25-50 mg) by mouth every 6 hours as needed for anxiety    Anxiety       OMEGA 3 PO      Take 2,400 mg by mouth daily        VITAMIN C PO      Take 500 mg by mouth daily

## 2017-08-29 NOTE — PROGRESS NOTES
SUBJECTIVE:   Elizabeth Santizo is a 52 year old female who presents to clinic today for the following health issues:    URINARY TRACT SYMPTOMS  Onset: 4 days    Description:   Painful urination (Dysuria): YES  Blood in urine (Hematuria): no   Delay in urine (Hesitency): YES    Intensity: mild    Progression of Symptoms:  worsening    Accompanying Signs & Symptoms:  Fever/chills: no   Flank pain no   Nausea and vomiting: no   Any vaginal symptoms: none  Abdominal/Pelvic Pain: no     History:   History of frequent UTI's: no, once every couple of years  History of kidney stones: no   Sexually Active: YES  Possibility of pregnancy: No    Precipitating factors:   soap    Therapies Tried and outcome: Increase fluid intake    Problem list and histories reviewed & adjusted, as indicated.  Additional history: as documented    Labs reviewed in EPIC    Reviewed and updated as needed this visit by clinical staff     Reviewed and updated as needed this visit by Provider         ROS:  Constitutional, HEENT, cardiovascular, pulmonary, gi and gu systems are negative, except as otherwise noted.      OBJECTIVE:   /75 (BP Location: Right arm, Patient Position: Chair, Cuff Size: Adult Regular)  Pulse 66  Temp 96.6  F (35.9  C) (Tympanic)  Ht 5' (1.524 m)  Wt 135 lb (61.2 kg)  BMI 26.37 kg/m2  Body mass index is 26.37 kg/(m^2).  GENERAL: healthy, alert and no distress  ABDOMEN: soft, nontender  PSYCH: mentation appears normal, affect normal/bright    Diagnostic Test Results:  Urinalysis - positive for UTI    ASSESSMENT/PLAN:     1. UTI  - UA reflex to Microscopic and Culture ([place] and [facility] (except [place] and [place])  - Urine Microscopic  - ciprofloxacin (CIPRO) 500 MG tablet; Take 1 tablet (500 mg) by mouth 2 times daily for 5 days  Dispense: 10 tablet; Refill: 0  -urine culture pending   -drink more fluids     See Patient Instructions    SOLANGE Gilmore [facility]

## 2017-08-30 ENCOUNTER — MYC MEDICAL ADVICE (OUTPATIENT)
Dept: FAMILY MEDICINE | Facility: CLINIC | Age: 52
End: 2017-08-30

## 2017-08-31 LAB
BACTERIA SPEC CULT: ABNORMAL
Lab: ABNORMAL
SPECIMEN SOURCE: ABNORMAL

## 2017-09-20 ENCOUNTER — TELEPHONE (OUTPATIENT)
Dept: FAMILY MEDICINE | Facility: CLINIC | Age: 52
End: 2017-09-20

## 2017-09-20 DIAGNOSIS — I10 ESSENTIAL HYPERTENSION WITH GOAL BLOOD PRESSURE LESS THAN 140/90: ICD-10-CM

## 2017-09-20 RX ORDER — ATENOLOL 25 MG/1
25 TABLET ORAL DAILY
Qty: 90 TABLET | Refills: 3 | Status: CANCELLED | OUTPATIENT
Start: 2017-09-20

## 2017-09-20 RX ORDER — METOPROLOL SUCCINATE 50 MG/1
50 TABLET, EXTENDED RELEASE ORAL DAILY
Qty: 90 TABLET | Refills: 3 | Status: SHIPPED | OUTPATIENT
Start: 2017-09-20 | End: 2018-10-08

## 2017-09-20 NOTE — TELEPHONE ENCOUNTER
Left message on answering machine for patient to call back.  When she calls back, please advise her Atenolol is not available due to a nationwide shortage, and Ilana has changed it to metoprolol (she can check with her pharmacy for this. )   Ilana would like her to come in for a free RN BP recheck, please in 2 weeks (after she changes meds) You could help her schedule the appt, or let her know to call back and schedule RN BP recheck in 2 weeks after she starts the new med,   Thanks!     Mayra Yang RNC

## 2017-09-21 NOTE — TELEPHONE ENCOUNTER
MILDRED Preciado- see message below from patient.  I have attempted to contact patient and left a message on her voicemail.  I think she should come in if she is not wanting to take metoprolol.  Unsure what she has heard about this medication.  Not taking anything for her HTN is not the answer. Victoria BENAVIDEZ RN

## 2017-09-21 NOTE — TELEPHONE ENCOUNTER
Patient was already called and left message per Annie's note.  Patient needs Ilana's message below.    Analy Andrea RN

## 2017-09-21 NOTE — TELEPHONE ENCOUNTER
Agree - there are other options for BP medications that we could talk about in an office visit.  The reason I switched her to metoprolol is that it is in the same drug class as the atenolol and therefore has similar side effect profile.  Ilana Mares, CNP

## 2017-09-21 NOTE — TELEPHONE ENCOUNTER
Patient notified.  She states she is NOT going to take Metoprolol as she has heard awful side effects from this medication.  She states she will just not take anything at all.

## 2017-09-27 PROCEDURE — 82274 ASSAY TEST FOR BLOOD FECAL: CPT | Performed by: NURSE PRACTITIONER

## 2017-09-28 DIAGNOSIS — Z12.11 SPECIAL SCREENING FOR MALIGNANT NEOPLASMS, COLON: ICD-10-CM

## 2017-09-28 LAB — HEMOCCULT STL QL IA: NEGATIVE

## 2017-10-12 ENCOUNTER — MYC MEDICAL ADVICE (OUTPATIENT)
Dept: FAMILY MEDICINE | Facility: CLINIC | Age: 52
End: 2017-10-12

## 2017-10-16 ENCOUNTER — RADIANT APPOINTMENT (OUTPATIENT)
Dept: GENERAL RADIOLOGY | Facility: CLINIC | Age: 52
End: 2017-10-16
Attending: NURSE PRACTITIONER
Payer: COMMERCIAL

## 2017-10-16 ENCOUNTER — OFFICE VISIT (OUTPATIENT)
Dept: FAMILY MEDICINE | Facility: CLINIC | Age: 52
End: 2017-10-16
Payer: COMMERCIAL

## 2017-10-16 VITALS
TEMPERATURE: 97.1 F | BODY MASS INDEX: 26.7 KG/M2 | HEIGHT: 60 IN | SYSTOLIC BLOOD PRESSURE: 127 MMHG | WEIGHT: 136 LBS | HEART RATE: 84 BPM | DIASTOLIC BLOOD PRESSURE: 79 MMHG

## 2017-10-16 DIAGNOSIS — R20.2 TINGLING OF RIGHT UPPER EXTREMITY: Primary | ICD-10-CM

## 2017-10-16 DIAGNOSIS — R20.2 TINGLING OF RIGHT UPPER EXTREMITY: ICD-10-CM

## 2017-10-16 PROCEDURE — 72040 X-RAY EXAM NECK SPINE 2-3 VW: CPT

## 2017-10-16 PROCEDURE — 99213 OFFICE O/P EST LOW 20 MIN: CPT | Performed by: NURSE PRACTITIONER

## 2017-10-16 RX ORDER — LORAZEPAM 1 MG/1
TABLET ORAL
Qty: 2 TABLET | Refills: 0 | Status: SHIPPED | OUTPATIENT
Start: 2017-10-16 | End: 2018-01-11

## 2017-10-16 RX ORDER — PYRIDOXINE HCL (VITAMIN B6) 50 MG
TABLET ORAL DAILY PRN
COMMUNITY

## 2017-10-16 NOTE — PATIENT INSTRUCTIONS
Call [phone] to schedule mammogram.    Thank you for choosing [facility].  You may be receiving a survey in the mail from [employer] [facility] [employer] regarding your visit today.  Please take a few minutes to complete and return the survey to let us know how we are doing.      If you have questions or concerns, please contact us via Kaseya or you can contact your care team at [phone].    Our Clinic hours are:  Monday 6:40 am  to 7:00 pm  Tuesday -Friday 6:40 am to 5:00 pm    The [place] outpatient lab hours are:  Monday - Friday 6:10 am to 4:45 pm  Saturdays 7:00 am to 11:00 am  Appointments are required, call [phone]    If you have clinical questions after hours or would like to schedule an appointment,  call the clinic at [phone].

## 2017-10-16 NOTE — PROGRESS NOTES
S: Elizabeth is a 53 yo F who presents for evaluation of numbness and tingling in her right arm. It has been happening off and on for the past 2-3 months and seems worse in the morning. She comes today because when she woke up today it took about 20 minutes for the tingling to go away. Sitting or laying down makes it worse. She never has pain but just feels like something is pinched in her right shoulder. Sometimes feels this on the left side, but the right side is worse. No h/o injury and has never has this before. 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 Has tried going to the chiropractor and taking tylenol and applying ice for this with no relief.     Review Of Systems  Skin: negative  Eyes: negative  Ears/Nose/Throat: negative  Respiratory: No shortness of breath, dyspnea on exertion, cough, or hemoptysis  Cardiovascular: negative  Gastrointestinal: negative  Genitourinary: negative  Musculoskeletal: Right arm numbness/tingling  Neurologic: negative  Psychiatric: negative  Hematologic/Lymphatic/Immunologic: negative  Endocrine: negative    O: /79 (BP Location: Left arm)  Pulse 84  Temp 97.1  F (36.2  C) (Oral)  Ht 5' (1.524 m)  Wt 136 lb (61.7 kg)  BMI 26.56 kg/m2    Physical Exam   Constitutional: She is well-developed, well-nourished, and in no distress.   HENT:   Head: Normocephalic and atraumatic.   Musculoskeletal:        Right shoulder: She exhibits normal range of motion, no tenderness, no bony tenderness, no swelling, no pain and normal strength.   Normal adduction and abduction of R shoulder. Normal internal rotation.    Neurological: She is alert.   Reflex Scores:       Brachioradialis reflexes are 2+ on the right side.  Skin: Skin is warm.   Psychiatric: Mood and affect normal.   Nursing note and vitals reviewed.    A: Will have pt get MRI.    P:  Tingling of right upper extremity:   - Xray Cervical Spine today    - MRI to evaluate for nerve impingement   - Prescription given to take ativan 1mg  tablet 30 minutes prior to MRI.    Le Henson, BETHANY Student   10/16/17

## 2017-10-16 NOTE — MR AVS SNAPSHOT
After Visit Summary   10/16/2017    Elizabeth Santizo    MRN: [id number]           Patient Information     Date Of Birth          1965        Visit Information        Provider Department      10/16/2017 1:40 PM Ilana Mares APRN CNP [facility]        Today's Diagnoses     Tingling of right upper extremity    -  1      Care Instructions      Call [phone] to schedule mammogram.    Thank you for choosing [facility].  You may be receiving a survey in the mail from Shamika Aaron regarding your visit today.  Please take a few minutes to complete and return the survey to let us know how we are doing.      If you have questions or concerns, please contact us via Kopi or you can contact your care team at [phone].    Our Clinic hours are:  Monday 6:40 am  to 7:00 pm  Tuesday -Friday 6:40 am to 5:00 pm    The [place] outpatient lab hours are:  Monday - Friday 6:10 am to 4:45 pm  Saturdays 7:00 am to 11:00 am  Appointments are required, call [phone]    If you have clinical questions after hours or would like to schedule an appointment,  call the clinic at [phone].            Follow-ups after your visit        Future tests that were ordered for you today     Open Future Orders        Priority Expected Expires Ordered    MR Cervical Spine w/o Contrast Routine  10/16/2018 10/16/2017            Who to contact     If you have questions or need follow up information about today's clinic visit or your schedule please contact [facility] directly at [phone].  Normal or non-critical lab and imaging results will be communicated to you by MyChart, letter or phone within 4 business days after the clinic has received the results. If you do not hear from us within 7 days, please contact the clinic through [employer] or phone. If you have a critical or abnormal lab result, we will notify you by phone as soon as possible.  Submit refill requests  through Triblio or call your pharmacy and they will forward the refill request to us. Please allow 3 business days for your refill to be completed.          Additional Information About Your Visit        [email or web address] Information     Triblio gives you secure access to your electronic health record. If you see a primary care provider, you can also send messages to your care team and make appointments. If you have questions, please call your primary care clinic.  If you do not have a primary care provider, please call [phone] and they will assist you.        Care EveryWhere ID     This is your Care EveryWhere ID. This could be used by other organizations to access your [place] medical records  [id number]        Your Vitals Were     Pulse Temperature Height BMI (Body Mass Index)          84 97.1  F (36.2  C) (Oral) 5' (1.524 m) 26.56 kg/m2         Blood Pressure from Last 3 Encounters:   10/16/17 127/79   08/29/17 112/75   08/22/17 118/78    Weight from Last 3 Encounters:   10/16/17 136 lb (61.7 kg)   08/29/17 135 lb (61.2 kg)   07/21/17 132 lb 8 oz (60.1 kg)                 Today's Medication Changes          These changes are accurate as of: 10/16/17  2:51 PM.  If you have any questions, ask your nurse or doctor.               Start taking these medicines.        Dose/Directions    LORazepam 1 MG tablet   Commonly known as:  ATIVAN   Used for:  Tingling of right upper extremity   Started by:  Ilana Mares, SOLANGE CNP        Take 30 minutes prior to MRI.  Do not operate a vehicle after taking this medication. May repeat x1 if needed   Quantity:  2 tablet   Refills:  0            Where to get your medicines      Some of these will need a paper prescription and others can be bought over the counter.  Ask your nurse if you have questions.     Bring a paper prescription for each of these medications     LORazepam 1 MG tablet                Primary Care Provider Office Phone # Fax #    Ilana Patricio  Vishnu, SOLANGE [phone]       [address] [facility] [address]        Equal Access to Services     JOSELO WHITFIELD : Hadii aad ku hadeunsylvester Diaz, davemark anthony spencervivekha, ximenabenjamin kaannamariada cha, ledy anibalin hayaaellie nogueranate salomon ramonita jaime. So [facility] [phone].    ATENCIÓN: Si habla español, tiene a jimenez disposición servicios gratuitos de asistencia lingüística. Llame al [phone].    We comply with applicable federal civil rights laws and [place] laws. We do not discriminate on the basis of race, color, national origin, age, disability, sex, sexual orientation, or gender identity.            Thank you!     Thank you for choosing [facility]  for your care. Our goal is always to provide you with excellent care. Hearing back from our patients is one way we can continue to improve our services. Please take a few minutes to complete the written survey that you may receive in the mail after your visit with us. Thank you!             Your Updated Medication List - Protect others around you: Learn how to safely use, store and throw away your medicines at www.disposemymeds.org.          This list is accurate as of: 10/16/17  2:51 PM.  Always use your most recent med list.                   Brand Name Dispense Instructions for use Diagnosis    atorvastatin 40 MG tablet    LIPITOR    90 tablet    Take 1 tablet (40 mg) by mouth daily    Hyperlipidemia with target LDL less than 130       B-12 100 MCG Tabs           fluticasone 50 MCG/ACT spray    FLONASE    1 Bottle    Spray 1 spray into both nostrils daily    Chronic seasonal allergic rhinitis, unspecified trigger       LORazepam 1 MG tablet    ATIVAN    2 tablet    Take 30 minutes prior to MRI.  Do not operate a vehicle after taking this medication. May repeat x1 if needed    Tingling of right upper extremity       metoprolol 50 MG 24 hr tablet    TOPROL-XL    90 tablet    Take 1 tablet (50 mg) by mouth daily    Essential hypertension with  goal blood pressure less than 140/90       OMEGA 3 PO      Take 2,400 mg by mouth daily        VITAMIN C PO      Take 500 mg by mouth daily

## 2017-10-16 NOTE — PROGRESS NOTES
"  SUBJECTIVE:   Elizabeth Santizo is a 52 year old female who presents to clinic today for the following health issues:      Musculoskeletal problem      Duration: off and on for 3-4 months    Description  Tingling that starts in right upper back and radiates down right arm to fingers  Arms fall asleep when she lies down - happens no matter what position she lays in.    Intensity:  Moderate to severe at times    Accompanying signs and symptoms: numbness and tingling- mostly in right arm, sometimes left    History  Previous similar problem: has had neck issues off and on for years, goes to chiropractor for adjustments once in a while  Previous evaluation:  None  4 years ago had an MRI done of right shoulder, bursitis in shoulder  That has improved    Precipitating or alleviating factors:  Trauma or overuse: no ;  Works on a computer 8 hours a day  Aggravating factors include: sitting    Therapies tried and outcome: ice, NSAID - and chiropractor - not helpful          Problem list and histories reviewed & adjusted, as indicated.  Additional history: as documented      Reviewed and updated as needed this visit by clinical staffTobacco  Allergies  Meds       Reviewed and updated as needed this visit by Provider         ROS:  Constitutional, HEENT, cardiovascular, pulmonary, gi and gu systems are negative, except as otherwise noted.      OBJECTIVE:   /79 (BP Location: Left arm)  Pulse 84  Temp 97.1  F (36.2  C) (Oral)  Ht 5' (1.524 m)  Wt 136 lb (61.7 kg)  BMI 26.56 kg/m2  Body mass index is 26.56 kg/(m^2).  GENERAL: healthy, alert and no distress  MS: no tenderness in neck, shoulders or upper back to palpation. ROM in shoulders is normal. Neck ROM - bilateral ear to shoulder movement is \"stiff\". Strength in both upper extremities 5/5. DTRs 2/4      ASSESSMENT/PLAN:       ICD-10-CM    1. Tingling of right upper extremity R20.2 Suspect cervical pathology - MRI to further assess.  XR Cervical Spine 2/3 " Views     MR Cervical Spine w/o Contrast     LORazepam (ATIVAN) 1 MG tablet       Patient Instructions     Call [phone] to schedule mammogram.      The risks, benefits and treatment options of prescribed medications or other treatments have been discussed with the patient. The patient verbalized their understanding and should call or follow up if no improvement or if they develop further problems.    SOLANGE [facility]

## 2017-10-16 NOTE — NURSING NOTE
Chief Complaint   Patient presents with     Musculoskeletal Problem     Health Maintenance     mammogram due       Initial /79 (BP Location: Left arm)  Pulse 84  Temp 97.1  F (36.2  C) (Oral)  Ht 5' (1.524 m)  Wt 136 lb (61.7 kg)  BMI 26.56 kg/m2 Estimated body mass index is 26.56 kg/(m^2) as calculated from the following:    Height as of this encounter: 5' (1.524 m).    Weight as of this encounter: 136 lb (61.7 kg).  Medication Reconciliation: complete

## 2017-10-17 ENCOUNTER — HOSPITAL ENCOUNTER (OUTPATIENT)
Dept: MRI IMAGING | Facility: CLINIC | Age: 52
Discharge: HOME | End: 2017-10-17
Attending: NURSE PRACTITIONER | Admitting: NURSE PRACTITIONER
Payer: COMMERCIAL

## 2017-10-17 DIAGNOSIS — R20.2 TINGLING OF RIGHT UPPER EXTREMITY: ICD-10-CM

## 2017-10-17 PROCEDURE — 72141 MRI NECK SPINE W/O DYE: CPT

## 2017-10-19 NOTE — PROGRESS NOTES
MRI shows a disc bulge at C5-6 which appears to likely be the cause of her symptoms.  Recommend to follow-up with PCP in clinic for further management instructions

## 2017-10-30 ENCOUNTER — RADIANT APPOINTMENT (OUTPATIENT)
Dept: GENERAL RADIOLOGY | Facility: CLINIC | Age: 52
End: 2017-10-30
Attending: NURSE PRACTITIONER
Payer: COMMERCIAL

## 2017-10-30 ENCOUNTER — HOSPITAL ENCOUNTER (OUTPATIENT)
Dept: MAMMOGRAPHY | Facility: CLINIC | Age: 52
Discharge: HOME | End: 2017-10-30
Attending: NURSE PRACTITIONER | Admitting: NURSE PRACTITIONER
Payer: COMMERCIAL

## 2017-10-30 ENCOUNTER — OFFICE VISIT (OUTPATIENT)
Dept: FAMILY MEDICINE | Facility: CLINIC | Age: 52
End: 2017-10-30
Payer: COMMERCIAL

## 2017-10-30 ENCOUNTER — TELEPHONE (OUTPATIENT)
Dept: FAMILY MEDICINE | Facility: CLINIC | Age: 52
End: 2017-10-30

## 2017-10-30 VITALS
BODY MASS INDEX: 26.56 KG/M2 | TEMPERATURE: 97.4 F | SYSTOLIC BLOOD PRESSURE: 126 MMHG | WEIGHT: 136 LBS | HEART RATE: 82 BPM | DIASTOLIC BLOOD PRESSURE: 82 MMHG

## 2017-10-30 DIAGNOSIS — M48.02 FORAMINAL STENOSIS OF CERVICAL REGION: ICD-10-CM

## 2017-10-30 DIAGNOSIS — M50.30 BULGING OF CERVICAL INTERVERTEBRAL DISC: ICD-10-CM

## 2017-10-30 DIAGNOSIS — Z12.31 VISIT FOR SCREENING MAMMOGRAM: ICD-10-CM

## 2017-10-30 DIAGNOSIS — S69.91XA INJURY OF RIGHT WRIST, INITIAL ENCOUNTER: ICD-10-CM

## 2017-10-30 DIAGNOSIS — S69.91XA INJURY OF RIGHT WRIST, INITIAL ENCOUNTER: Primary | ICD-10-CM

## 2017-10-30 PROCEDURE — 99213 OFFICE O/P EST LOW 20 MIN: CPT | Performed by: NURSE PRACTITIONER

## 2017-10-30 PROCEDURE — 73110 X-RAY EXAM OF WRIST: CPT | Mod: RT

## 2017-10-30 PROCEDURE — G0202 SCR MAMMO BI INCL CAD: HCPCS

## 2017-10-30 NOTE — TELEPHONE ENCOUNTER
Patient is requesting note for standing desk at work.  This was briefly discussed at OV today - do not see note in patient chart.  Would you be able to send this to her via Tencent?    Routing to provider.  Gisselle CHEATHAM RN

## 2017-10-30 NOTE — TELEPHONE ENCOUNTER
Reason for Call:  letter    Detailed comments: patient is calling and stating when she was here today to see Ilana Vishnu, they talked about a letter for her to take to her employer for a stand up desk. Could we get that letter done, and she will pick it up, or if possible can this letter be done on My Chart?   Phone Number Patient can be reached at: Home number on file [phone] (home)    Best Time: any    Can we leave a detailed message on this number? YES   Chandrika Carranza  Clinic Station  Flex      Call taken on 10/30/2017 at 3:46 PM by Chandrika Carranza

## 2017-10-30 NOTE — PATIENT INSTRUCTIONS
Thank you for choosing [facility].  You may be receiving a survey in the mail from Shamika Aaron regarding your visit today.  Please take a few minutes to complete and return the survey to let us know how we are doing.      If you have questions or concerns, please contact us via Netseer or you can contact your care team at [phone].    Our Clinic hours are:  Monday 6:40 am  to 7:00 pm  Tuesday -Friday 6:40 am to 5:00 pm    The [place] outpatient lab hours are:  Monday - Friday 6:10 am to 4:45 pm  Saturdays 7:00 am to 11:00 am  Appointments are required, call [phone]    If you have clinical questions after hours or would like to schedule an appointment,  call the clinic at [phone].

## 2017-10-30 NOTE — TELEPHONE ENCOUNTER
Letter written and signed.    I could not find a way to send through My Chart.  Ilana Mares, CNP

## 2017-10-30 NOTE — NURSING NOTE
Chief Complaint   Patient presents with     Results     patient is here today to review recent MRI test results of cervical spine;  follow up to visit on 10/16/2017     Musculoskeletal Problem     hurt right hand       Initial /82  Pulse 82  Temp 97.4  F (36.3  C) (Oral)  Wt 136 lb (61.7 kg)  BMI 26.56 kg/m2 Estimated body mass index is 26.56 kg/(m^2) as calculated from the following:    Height as of 10/16/17: 5' (1.524 m).    Weight as of this encounter: 136 lb (61.7 kg).  Medication Reconciliation: complete

## 2017-10-30 NOTE — MR AVS SNAPSHOT
After Visit Summary   10/30/2017    Elizabeth Santizo    MRN: [id number]           Patient Information     Date Of Birth          1965        Visit Information        Provider Department      10/30/2017 2:40 PM Ilana Mares APRN [facility]        Today's Diagnoses     Injury of right wrist, initial encounter    -  1    Foraminal stenosis of cervical region        Bulging of cervical intervertebral disc          Care Instructions          Thank you for choosing [facility].  You may be receiving a survey in the mail from Shamika Aaron regarding your visit today.  Please take a few minutes to complete and return the survey to let us know how we are doing.      If you have questions or concerns, please contact us via [employer] or you can contact your care team at [phone].    Our Clinic hours are:  Monday 6:40 am  to 7:00 pm  Tuesday -Friday 6:40 am to 5:00 pm    The [place] outpatient lab hours are:  Monday - Friday 6:10 am to 4:45 pm  Saturdays 7:00 am to 11:00 am  Appointments are required, call [phone]    If you have clinical questions after hours or would like to schedule an appointment,  call the clinic at [phone].            Follow-ups after your visit        Additional Services     PHYSICAL THERAPY REFERRAL       *This therapy referral will be filtered to a centralized scheduling office at [facility] and the patient will receive a call to schedule an appointment at a [facility] location most convenient for them. *     [facility] provides Physical Therapy evaluation and treatment and many specialty services across the [facility] system.  If requesting a specialty program, please choose from the list below.    If you have not heard from the scheduling office within 2 business days, please call [phone] for all locations, with the exception of [place], please call [phone].  Treatment: Evaluation &  "Treatment  Special Instructions/Modalities:   Special Programs: None    Please be aware that coverage of these services is subject to the terms and limitations of your health insurance plan.  Call member services at your health plan with any benefit or coverage questions.      **Note to Provider:  If you are referring outside of [facility] for the therapy appointment, please list the name of the location in the \"special instructions\" above, print the referral and give to the patient to schedule the appointment.                  Your next 10 appointments already scheduled     Oct 30, 2017  4:30 PM CDT   MA SCREENING DIGITAL BILATERAL with [place]   [facility] ([facility])    [address] [address]   [phone]           Do not use any powder, lotion or deodorant under your arms or on your breast. If you do, we will ask you to remove it before your exam.  Wear comfortable, two-piece clothing.  If you have any allergies, tell your care team.  Bring any previous mammograms from other facilities or have them mailed to the breast center. Three-dimensional (3D) mammograms are available at [facility] locations in [facility], [place], [place], [facility], [place], [place], and [place]. [facility] locations include [place] [facility] in [place]. Benefits of 3D mammograms include: - Improved rate of cancer detection - Decreases your chance of having to go back for more tests, which means fewer: - \"False-positive\" results (This means that there is an abnormal area but it isn't cancer.) - Invasive testing procedures, such as a biopsy or surgery - Can provide clearer images of the breast if you have dense breast tissue. 3D mammography is an optional exam that anyone can have with a 2D mammogram. It doesn't replace or take the place of a 2D mammogram. 2D mammograms remain an effective screening test for all women.  Not all insurance companies " cover the cost of a 3D mammogram. Check with your insurance.              Future tests that were ordered for you today     Open Future Orders        Priority Expected Expires Ordered    XR Wrist Right G/E 3 Views Routine 10/30/2017 10/30/2018 10/30/2017            Who to contact     If you have questions or need follow up information about today's clinic visit or your schedule please contact [facility] directly at [phone].  Normal or non-critical lab and imaging results will be communicated to you by Mango Healthhart, letter or phone within 4 business days after the clinic has received the results. If you do not hear from us within 7 days, please contact the clinic through Oree or phone. If you have a critical or abnormal lab result, we will notify you by phone as soon as possible.  Submit refill requests through Oree or call your pharmacy and they will forward the refill request to us. Please allow 3 business days for your refill to be completed.          Additional Information About Your Visit        Oree Information     Oree gives you secure access to your electronic health record. If you see a primary care provider, you can also send messages to your care team and make appointments. If you have questions, please call your primary care clinic.  If you do not have a primary care provider, please call [phone] and they will assist you.        Care EveryWhere ID     This is your Care EveryWhere ID. This could be used by other organizations to access your [place] medical records  [id number]        Your Vitals Were     Pulse Temperature BMI (Body Mass Index)             82 97.4  F (36.3  C) (Oral) 26.56 kg/m2          Blood Pressure from Last 3 Encounters:   10/30/17 126/82   10/16/17 127/79   08/29/17 112/75    Weight from Last 3 Encounters:   10/30/17 136 lb (61.7 kg)   10/16/17 136 lb (61.7 kg)   08/29/17 135 lb (61.2 kg)              We Performed the Following     PHYSICAL THERAPY  REFERRAL        Primary Care Provider Office Phone # Fax #    Ilana Mares, APRN [facility] [phone] [address] [address]        Equal Access to Services     JOSELO WHITFIELD : Hadii aad ku hadeuno Socarmenali, waaxda luqadaha, qaybta kaalmada adeegyada, waxcarola idiin carmenn poornimanate salomon ramonita jaime. So [facility] [phone].    ATENCIÓN: Si habla español, tiene a jimenez disposición servicios gratuitos de asistencia lingüística. Llame al [phone].    We comply with applicable federal civil rights laws and [place] laws. We do not discriminate on the basis of race, color, national origin, age, disability, sex, sexual orientation, or gender identity.            Thank you!     Thank you for choosing [facility]  for your care. Our goal is always to provide you with excellent care. Hearing back from our patients is one way we can continue to improve our services. Please take a few minutes to complete the written survey that you may receive in the mail after your visit with us. Thank you!             Your Updated Medication List - Protect others around you: Learn how to safely use, store and throw away your medicines at www.disposemymeds.org.          This list is accurate as of: 10/30/17  3:10 PM.  Always use your most recent med list.                   Brand Name Dispense Instructions for use Diagnosis    atorvastatin 40 MG tablet    LIPITOR    90 tablet    Take 1 tablet (40 mg) by mouth daily    Hyperlipidemia with target LDL less than 130       B-12 100 MCG Tabs           fluticasone 50 MCG/ACT spray    FLONASE    1 Bottle    Spray 1 spray into both nostrils daily    Chronic seasonal allergic rhinitis, unspecified trigger       LORazepam 1 MG tablet    ATIVAN    2 tablet    Take 30 minutes prior to MRI.  Do not operate a vehicle after taking this medication. May repeat x1 if needed    Tingling of right upper extremity       metoprolol 50 MG 24 hr tablet    TOPROL-XL    90  tablet    Take 1 tablet (50 mg) by mouth daily    Essential hypertension with goal blood pressure less than 140/90       OMEGA 3 PO      Take 2,400 mg by mouth daily        VITAMIN C PO      Take 500 mg by mouth daily

## 2017-10-30 NOTE — LETTER
[facility]  [address] [address]  [phone]          October 30, 2017    RE:  Elizabeth Santizo                                                                                                                                                       [address] [place] [address]            To whom it may concern:    Elizabeth Santizo is a patient of mine and was seen today in my clinic. Please allow her to have a stand up desk at work for medical reasons.        Sincerely,            Ilana Mares, CNP

## 2017-10-30 NOTE — PROGRESS NOTES
S: Elizabeth is a 51yo F here for follow up on her MRI results. Still is having issues with her right arm falling asleep and occasional pain that shoots down her left arm. Weakness mostly on her right side, but sometimes her left as well.  Comes and goes depending on how she sleeps. Occasional headaches- tries ice/iburprofen which doesn't help really. No vision changes. Notices right side discomfort more because she is right handed. Symptoms are worse in the morning when she wakes up and about midday at work after being on the computer.    In addition she fell on Thursday after her last visit and hurt right thumb. Tried ice and ibuprofen, helps some. No bruising, swelling. Pain is described as shooting. Using thumb makes it worse.     Requests a note for standing desk at work.        Allergies   Allergen Reactions     Amoxicillin Rash     Review Of Systems  Skin: scratches on left arm from fall  Eyes: negative for vision changes  Respiratory: No shortness of breath  Cardiovascular: negative chest pain  Musculoskeletal: see hpi  Neurologic: occasional headaches      O: /82  Pulse 82  Temp 97.4  F (36.3  C) (Oral)  Wt 136 lb (61.7 kg)  BMI 26.56 kg/m2     Physical Exam   Constitutional: She is well-developed, well-nourished, and in no distress.   HENT:   Head: Normocephalic and atraumatic.   Musculoskeletal:        Right wrist: She exhibits tenderness. She exhibits normal range of motion and no laceration.   Normal strength bilateral UE's. Tenderness over right wrist and right thumb. Pain in thumb with any movement. Negative for navicular pain.  CMS intact UE's.   Neurological: She is alert. She displays no weakness. Gait normal.   Reflex Scores:       Brachioradialis reflexes are 2+ on the right side and 2+ on the left side.  Skin: Skin is warm and dry.   Psychiatric: Mood and affect normal.     A/P:   Foraminal stenosis of cervical region   Bulging of cervical intervertebral disc  - Physical therapy once a week  for 1 month.  - Follow up in clinic if no relief of symptoms after physical therapy    Injury of right wrist, initial encounter  -No signs of fracture. Treat as sprain.  -Ice, ibuprofen, rest    BETHANY Chaudhari Student   10/30/17

## 2017-10-30 NOTE — PROGRESS NOTES
SUBJECTIVE:   Elizabeth Santizo is a 52 year old female who presents to clinic today for the following health issues:      Chief Complaint   Patient presents with     Results     patient is here today to review recent MRI test results of cervical spine;  follow up to visit on 10/16/2017     Has been having right arm numbness and tingling for the last 2-3 months        Musculoskeletal problem/pain      Duration: 10 days    Description  Location: right thumb/ wrist pain    Intensity:  moderate    Accompanying signs and symptoms: none    History  Previous similar problem: no   Previous evaluation:  none    Precipitating or alleviating factors:  Trauma or overuse: YES- fell onto outstretched hand 10/19/2017  Aggravating factors include: using her thumb/ lifting    Therapies tried and outcome: Ibuprofen        Problem list and histories reviewed & adjusted, as indicated.  Additional history: as documented      Reviewed and updated as needed this visit by clinical staffTobacco  Allergies  Meds       Reviewed and updated as needed this visit by Provider         ROS:  Constitutional, HEENT, cardiovascular, pulmonary, gi and gu systems are negative, except as otherwise noted.      OBJECTIVE:   /82  Pulse 82  Temp 97.4  F (36.3  C) (Oral)  Wt 136 lb (61.7 kg)  BMI 26.56 kg/m2  Body mass index is 26.56 kg/(m^2).  GENERAL: healthy, alert and no distress  MS: right wrist - appearance normal. Tender over the lateral wrist and thumb. ROM normal.    Xray independently reviewed, negative. Radiologist read pending.      ASSESSMENT/PLAN:         ICD-10-CM    1. Injury of right wrist, initial encounter S69.91XA Sprain.  Discussed ice, rest and NSAIDs    XR Wrist Right G/E 3 Views     2. Foraminal stenosis of cervical region M99.81 MRI results reviewed.  Start treatment with PT    PHYSICAL THERAPY REFERRAL   3. Bulging of cervical intervertebral disc M50.20 PHYSICAL THERAPY REFERRAL         The risks, benefits and  treatment options of prescribed medications or other treatments have been discussed with the patient. The patient verbalized their understanding and should call or follow up if no improvement or if they develop further problems.    SOLANGE Huerta [facility]

## 2017-10-31 NOTE — TELEPHONE ENCOUNTER
SARAHY for pt to call and let us know what she would like us to do with letter. It is on my desk.      [facility]

## 2017-11-29 ENCOUNTER — MYC MEDICAL ADVICE (OUTPATIENT)
Dept: FAMILY MEDICINE | Facility: CLINIC | Age: 52
End: 2017-11-29

## 2017-11-29 DIAGNOSIS — S69.91XD INJURY OF RIGHT WRIST, SUBSEQUENT ENCOUNTER: Primary | ICD-10-CM

## 2017-11-29 NOTE — TELEPHONE ENCOUNTER
Ilana:  Do you want to see pt in clinic again to reassess R wrist (OV of 10/30/17 with x-rays)?    Riddhi GUZMÁN RN

## 2017-12-06 ENCOUNTER — HOSPITAL ENCOUNTER (OUTPATIENT)
Dept: PHYSICAL THERAPY | Facility: CLINIC | Age: 52
Setting detail: THERAPIES SERIES
End: 2017-12-06
Attending: NURSE PRACTITIONER
Payer: COMMERCIAL

## 2017-12-06 ENCOUNTER — OFFICE VISIT (OUTPATIENT)
Dept: ORTHOPEDICS | Facility: CLINIC | Age: 52
End: 2017-12-06
Payer: COMMERCIAL

## 2017-12-06 ENCOUNTER — RADIANT APPOINTMENT (OUTPATIENT)
Dept: GENERAL RADIOLOGY | Facility: CLINIC | Age: 52
End: 2017-12-06
Attending: PEDIATRICS
Payer: COMMERCIAL

## 2017-12-06 VITALS
DIASTOLIC BLOOD PRESSURE: 91 MMHG | HEIGHT: 60 IN | SYSTOLIC BLOOD PRESSURE: 139 MMHG | BODY MASS INDEX: 27 KG/M2 | WEIGHT: 137.5 LBS

## 2017-12-06 DIAGNOSIS — M25.531 RIGHT WRIST PAIN: Primary | ICD-10-CM

## 2017-12-06 DIAGNOSIS — M25.531 RIGHT WRIST PAIN: ICD-10-CM

## 2017-12-06 DIAGNOSIS — S69.91XA INJURY OF RIGHT WRIST, INITIAL ENCOUNTER: ICD-10-CM

## 2017-12-06 PROCEDURE — 97140 MANUAL THERAPY 1/> REGIONS: CPT | Mod: GP | Performed by: PHYSICAL THERAPIST

## 2017-12-06 PROCEDURE — 97162 PT EVAL MOD COMPLEX 30 MIN: CPT | Mod: GP | Performed by: PHYSICAL THERAPIST

## 2017-12-06 PROCEDURE — 40000718 ZZHC STATISTIC PT DEPARTMENT ORTHO VISIT: Performed by: PHYSICAL THERAPIST

## 2017-12-06 PROCEDURE — 99244 OFF/OP CNSLTJ NEW/EST MOD 40: CPT | Performed by: PEDIATRICS

## 2017-12-06 PROCEDURE — 97110 THERAPEUTIC EXERCISES: CPT | Mod: GP | Performed by: PHYSICAL THERAPIST

## 2017-12-06 PROCEDURE — 73110 X-RAY EXAM OF WRIST: CPT | Mod: RT

## 2017-12-06 NOTE — PROGRESS NOTES
"Sports Medicine Clinic Visit    PCP: Ilana Mares    Elizabeth Santizo is a 52 year old female who is seen  in consultation at the request of  Ilana Mares C.N.P. presenting with right wrist injury.    Injury: Patient reports an injury around 6 weeks ago.  She tripped over a stump in the back yard and \"face planted\" and hit her right wrist on the ground. Pain increased over the weekend.  Has had mild swelling as well.    Location of Pain: right wrist over scaphoid and radiates proximal and distal from there  Duration of Pain: ~6 week(s)  Rating of Pain at worst: 8/10  Rating of Pain Currently: 3/10  Symptoms are better with: Nothing  Symptoms are worse with: Lifting, grabbing, carrying, holding, typing  Additional Features:   Positive: slight swelling, grinding and paresthesias   Negative: bruising, popping, catching, locking, instability, numbness and weakness  Other evaluation and/or treatments so far consists of: Ice and thumb splint and wraps  Prior History of related problems: None    Social History: Desk work    Review of Systems  Skin: initial bruising, initial swelling  Musculoskeletal: as above  Neurologic: no numbness, paresthesias  Remainder of review of systems is negative including constitutional, CV, pulmonary, GI, except as noted in HPI or medical history.    Patient's current problem list, past medical and surgical history, and family history were reviewed.    Patient Active Problem List   Diagnosis     Hypertension goal BP (blood pressure) < 140/90     Hyperlipidemia with target LDL less than 130     Anxiety     Impaired fasting glucose     Past Medical History:   Diagnosis Date     Generalized headaches      Hyperlipidemia      Hypertension      Impaired fasting glucose      Overweight(278.02)      Past Surgical History:   Procedure Laterality Date     TUBAL LIGATION  1995     Family History   Problem Relation Age of Onset     DIABETES Mother      passed away in 2001     " BANASULEMA. Father       55 from [place]     CEREBROVASCULAR DISEASE Father      passed away in      CEREBROVASCULAR DISEASE Maternal Grandmother      passed away in      DIABETES Maternal Grandmother      passed away in      Hypertension Maternal Grandmother      Hyperlipidemia Maternal Grandmother      CANCER Maternal Aunt      aunt     DIABETES Cousin      3 cousins     DIABETES Other      all 8 aunts and uncles     Other Cancer Other      aunt - passed away      Breast Cancer No family hx of      Cancer - colorectal No family hx of          Objective  BP (!) 139/91  Ht 5' (1.524 m)  Wt 137 lb 8 oz (62.4 kg)  BMI 26.85 kg/m2      GENERAL APPEARANCE: healthy, alert and no distress   GAIT: NORMAL  SKIN: no suspicious lesions or rashes  HEENT: Sclera clear, anicteric  CV: good peripheral pulses  RESP: Breathing not labored  NEURO: Normal strength and tone, mentation intact and speech normal  PSYCH:  mentation appears normal and affect normal/bright    Bilateral Wrist and Hand exam    Inspection:       No swelling, bruising or deformity bilateral    Tender:       anatomic snuffbox right and      Throughout radial styloid tenosynovitis    Non Tender:       Remainder of the Wrist and Hand right    ROM:       Full and symmetric active and passive range of motion of the forearm, wrist and digits bilateral    Strength:       5/5 strength in the muscles of the hand, wrist and forearm bilateral    Special Tests:        positive (+) Finkelstein's maneuver right    Neurovascular:       2+ radial pulses bilaterally with brisk capillary refill and      normal sensation to light touch in the radial, median and ulnar nerve distributions      Radiology  I ordered, visualized and reviewed these images with the patient  WRIST RIGHT THREE OR MORE VIEWS  2017 3:09 PM    HISTORY: Right wrist pain  COMPARISON: None.  IMPRESSION: No acute fracture or dislocation.      Assessment:  1. Right wrist pain    2. Injury of  right wrist, initial encounter      Persistent right wrist pain s/p injury.  Will obtain MRI to evaluate for fracture.  Thumb spica splint in the interim.  Briefly discussed treatment for radial styloid tenosynovitis as well as some symptoms are consistent.    Plan:  - Today's Plan of Care:  MRI  Splints    -We also discussed other future treatment options:  Occupational therapy    Follow Up: At least 1-2 days following MRI    Concerning signs and symptoms were reviewed.  The patient expressed understanding of this management plan and all questions were answered at this time.    Thanks for the opportunity to participate in the care of this patient, I will keep you updated on their progress.    CC: Ilana Canas MD [place]  Primary Care Sports Medicine  [facility]

## 2017-12-06 NOTE — PROGRESS NOTES
Elizabeth Santizo     PHYSICAL THERAPY EVALUATION    12/06/17 1300   General Information   Type of Visit Initial OP Ortho PT Evaluation   Start of Care Date 12/06/17   Referring Physician Ilana Mares NP   Orders Evaluate and Treat   Date of Order 10/30/17   Medical Diagnosis forminal stenosis cervical spine   Body Part(s)   Body Part(s) Cervical Spine   Presentation and Etiology   Pertinent history of current problem (include personal factors and/or comorbidities that impact the POC) Neck issues off and on 15 years.  Sx numb, tingling and pain down both arms, to fingers 1-4, not pinky.  Cant attribute to anything.  Except works on computer 8 hours /day.  Neck pain , top of shoulders, L shoulder has limited motion, hurts behind back.  R hand dominant.  Sx sleeping wakes her, tingle at night, wakes in AM real sore.  Worse with computer lucio, just got standing desk at work and that has helped already.  R wrist pain initially from a fall.   Onset date of current episode/exacerbation 10/30/17   Prior Level of Function   Functional Level Prior Comment garden, yardwork, housework, stair climber   Current Level of Function   Patient role/employment history A. Employed   Fall Risk Screen   Fall screen completed by PT   Have you fallen 2 or more times in the past year? No   Have you fallen and had an injury in the past year? No   Is patient a fall risk? No   Cervical Spine   Posture mild fwd head, protracted scap L>R, flat middle T spine   Cervical Flexion % pulls,    Cervical Extension ROM EXT inc sx L UE   Cervical Right Side Bending ROM 75% pulls tight L   Cervical Left Side Bending ROM 50% increase LUE sx   Cervical Right Rotation ROM 75% tight B   Cervical Left Rotation ROM 75% inc sx L UE   Shoulder AROM Screen WNL ROM, no sx   Shoulder Abd (C5) Strength 5   Shoulder ER (C5, C6) Strength 5   Shoulder IR (C5, C6) Strength 5   Elbow Flexion (C5, C6) Strength 5   Elbow Extension (C7) Strength 5   Wrist  Extension (C6) Strength 5   Wrist Flexion (C7) Strength 5   Thumb Abd (C8) Strength R painful, +deQuervains   5th Finger Add (T1) Strength 5   Upper Trapezius Flexibility tight R>L   Levator Scapula Flexibility tight B   Scalene Flexibility tight R>L   Pectoralis Minor Flexibility tight R>L   Transverse Ligament Test normal   Spurling Test + EXT/L SB   Cervical Distraction Test + to decrease L UE sx, neutral alignment, too much flexion increased sx B UE   Cervical Rotation/Lateral Flexion Test + elev L first rib   Palpation tight R suboccipitals   Neurological Testing Comments +median nerve tension L, _ Phalens B   Thoracic Outlet Syndrome TOS Tests   Kwadwo' neg   Adson's (scalene outlet) questionable L, more neural tension   Ray's (pec minor outlet) mild + to reduce sx   Planned Therapy Interventions   Planned Therapy Interventions stretching;strengthening;manual therapy   Planned Modality Interventions   Planned Modality Interventions Traction   Planned Modality Interventions Comments possibly, as it did reduce sx   Clinical Impression   Criteria for Skilled Therapeutic Interventions Met yes, treatment indicated   PT Diagnosis L UE radicular sx, R UE TOS sx, cervical tightness   Functional limitations due to impairments sleeping, work, computer use   Clinical Presentation Evolving/Changing   Clinical Presentation Rationale spinal vs TOS, R wrist injury, cerv tightness   Clinical Decision Making (Complexity) Moderate complexity   Therapy Frequency 2 times/Week  (if able)   Predicted Duration of Therapy Intervention (days/wks) 4wks   Risk & Benefits of therapy have been explained Yes   Patient, Family & other staff in agreement with plan of care Yes   Education Assessment   Preferred Learning Style Pictures/video;Listening   Barriers to Learning No barriers   ORTHO GOALS   PT Ortho Eval Goals 1;2   Ortho Goal 1   Goal Description pt will  not wake from neck or UE sx in 4wk   Target Date 01/03/18   Ortho Goal 2    Goal Description pt will tolerate 60-90 min computer lucio without UE sx in 4wk   Target Date 01/03/18   Total Evaluation Time   Total Evaluation Time 25   Kris Hoenk, PT #[id number]  [facility]

## 2017-12-06 NOTE — LETTER
"    12/6/2017         RE: Elizabeth Santizo  [address] [place] [address]        Dear Colleague,    Thank you for referring your patient, Elizabeth Santizo, to the [facility]. Please see a copy of my visit note below.    Sports Medicine Clinic Visit    PCP: Ilana Mares    Elizabeth Santizo is a 52 year old female who is seen  in consultation at the request of  Ilana Mares C.N.P. presenting with right wrist injury.    Injury: Patient reports an injury around 6 weeks ago.  She tripped over a stump in the back yard and \"face planted\" and hit her right wrist on the ground. Pain increased over the weekend.  Has had mild swelling as well.    Location of Pain: right wrist over scaphoid and radiates proximal and distal from there  Duration of Pain: ~6 week(s)  Rating of Pain at worst: 8/10  Rating of Pain Currently: 3/10  Symptoms are better with: Nothing  Symptoms are worse with: Lifting, grabbing, carrying, holding, typing  Additional Features:   Positive: slight swelling, grinding and paresthesias   Negative: bruising, popping, catching, locking, instability, numbness and weakness  Other evaluation and/or treatments so far consists of: Ice and thumb splint and wraps  Prior History of related problems: None    Social History: Desk work    Review of Systems  Skin: initial bruising, initial swelling  Musculoskeletal: as above  Neurologic: no numbness, paresthesias  Remainder of review of systems is negative including constitutional, CV, pulmonary, GI, except as noted in HPI or medical history.    Patient's current problem list, past medical and surgical history, and family history were reviewed.    Patient Active Problem List   Diagnosis     Hypertension goal BP (blood pressure) < 140/90     Hyperlipidemia with target LDL less than 130     Anxiety     Impaired fasting glucose     Past Medical History:   Diagnosis Date     Generalized headaches      " Hyperlipidemia      Hypertension      Impaired fasting glucose      Overweight(278.02)      Past Surgical History:   Procedure Laterality Date     TUBAL LIGATION       Family History   Problem Relation Age of Onset     DIABETES Mother      passed away in      C.A.D. Father       55 from [place]     CEREBROVASCULAR DISEASE Father      passed away in      CEREBROVASCULAR DISEASE Maternal Grandmother      passed away in      DIABETES Maternal Grandmother      passed away in      Hypertension Maternal Grandmother      Hyperlipidemia Maternal Grandmother      CANCER Maternal Aunt      aunt     DIABETES Cousin      3 cousins     DIABETES Other      all 8 aunts and uncles     Other Cancer Other      aunt - passed away      Breast Cancer No family hx of      Cancer - colorectal No family hx of          Objective  BP (!) 139/91  Ht 5' (1.524 m)  Wt 137 lb 8 oz (62.4 kg)  BMI 26.85 kg/m2      GENERAL APPEARANCE: healthy, alert and no distress   GAIT: NORMAL  SKIN: no suspicious lesions or rashes  HEENT: Sclera clear, anicteric  CV: good peripheral pulses  RESP: Breathing not labored  NEURO: Normal strength and tone, mentation intact and speech normal  PSYCH:  mentation appears normal and affect normal/bright    Bilateral Wrist and Hand exam    Inspection:       No swelling, bruising or deformity bilateral    Tender:       anatomic snuffbox right and      Throughout radial styloid tenosynovitis    Non Tender:       Remainder of the Wrist and Hand right    ROM:       Full and symmetric active and passive range of motion of the forearm, wrist and digits bilateral    Strength:       5/5 strength in the muscles of the hand, wrist and forearm bilateral    Special Tests:        positive (+) Finkelstein's maneuver right    Neurovascular:       2+ radial pulses bilaterally with brisk capillary refill and      normal sensation to light touch in the radial, median and ulnar nerve distributions      Radiology  I  ordered, visualized and reviewed these images with the patient  WRIST RIGHT THREE OR MORE VIEWS  12/6/2017 3:09 PM    HISTORY: Right wrist pain  COMPARISON: None.  IMPRESSION: No acute fracture or dislocation.      Assessment:  1. Right wrist pain    2. Injury of right wrist, initial encounter      Persistent right wrist pain s/p injury.  Will obtain MRI to evaluate for fracture.  Thumb spica splint in the interim.  Briefly discussed treatment for radial styloid tenosynovitis as well as some symptoms are consistent.    Plan:  - Today's Plan of Care:  MRI  Splints    -We also discussed other future treatment options:  Occupational therapy    Follow Up: At least 1-2 days following MRI    Concerning signs and symptoms were reviewed.  The patient expressed understanding of this management plan and all questions were answered at this time.    Thanks for the opportunity to participate in the care of this patient, I will keep you updated on their progress.    CC: Ilana Canas MD [place]  Primary Care Sports Medicine  [facility]    Again, thank you for allowing me to participate in the care of your patient.        Sincerely,        Phuong Canas MD

## 2017-12-06 NOTE — MR AVS SNAPSHOT
After Visit Summary   12/6/2017    Elizabeth Satnizo    MRN: [id number]           Patient Information     Date Of Birth          1965        Visit Information        Provider Department      12/6/2017 2:40 PM Phuong Canas MD [facility]        Today's Diagnoses     Right wrist pain    -  1      Care Instructions    Plan:  - Today's Plan of Care:  MRI  Splints    -We also discussed other future treatment options:  Occupational therapy    Follow Up: At least 1-2 days following MRI              Follow-ups after your visit        Your next 10 appointments already scheduled     Dec 12, 2017  7:00 AM CST   Ortho Treatment with Lacey Cheney, PT   [facility] ([facility])    [address]  [address]  [place] [id number] [phone]            Dec 14, 2017  3:30 PM CST   Ortho Treatment with Lacey Cheney, PT   [facility] ([facility])    [address]  [address]  [place] [id number] [phone]              Future tests that were ordered for you today     Open Future Orders        Priority Expected Expires Ordered    MR Wrist Right w/o Contrast Routine  12/6/2018 12/6/2017            Who to contact     If you have questions or need follow up information about today's clinic visit or your schedule please contact [facility] directly at [phone].  Normal or non-critical lab and imaging results will be communicated to you by MyChart, letter or phone within 4 business days after the clinic has received the results. If you do not hear from us within 7 days, please contact the clinic through MyChart or phone. If you have a critical or abnormal lab result, we will notify you by phone as soon as possible.  Submit refill requests through [employer] or call your pharmacy and they will forward the refill request to us. Please allow 3 business days for your  refill to be completed.          Additional Information About Your Visit        MyChart Information     [employer] gives you secure access to your electronic health record. If you see a primary care provider, you can also send messages to your care team and make appointments. If you have questions, please call your primary care clinic.  If you do not have a primary care provider, please call [phone] and they will assist you.        Care EveryWhere ID     This is your Care EveryWhere ID. This could be used by other organizations to access your [place] medical records  [id number]        Your Vitals Were     Height BMI (Body Mass Index)                5' (1.524 m) 26.85 kg/m2           Blood Pressure from Last 3 Encounters:   12/06/17 (!) 139/91   10/30/17 126/82   10/16/17 127/79    Weight from Last 3 Encounters:   12/06/17 137 lb 8 oz (62.4 kg)   10/30/17 136 lb (61.7 kg)   10/16/17 136 lb (61.7 kg)               Primary Care Provider Office Phone # Fax #    Ilana Hendrix Sintia Mares, APRN [facility] [phone]       [address] [address]        Equal Access to Services     JOSELO WHITFIELD AH: Hadii tom ku hadasho Soomaali, waaxda luqadaha, qaybta kaalmada adeegyada, ledy moralesn valentina salomon ladwight jaime. So [facility] [phone].    ATENCIÓN: Si habla español, tiene a jimenez disposición servicios gratuitos de asistencia lingüística. Chelle al [phone].    We comply with applicable federal civil rights laws and [place] laws. We do not discriminate on the basis of race, color, national origin, age, disability, sex, sexual orientation, or gender identity.            Thank you!     Thank you for choosing [facility]  for your care. Our goal is always to provide you with excellent care. Hearing back from our patients is one way we can continue to improve our services. Please take a few minutes to complete the written survey that you may receive in the mail after  your visit with us. Thank you!             Your Updated Medication List - Protect others around you: Learn how to safely use, store and throw away your medicines at www.disposemymeds.org.          This list is accurate as of: 12/6/17  3:25 PM.  Always use your most recent med list.                   Brand Name Dispense Instructions for use Diagnosis    atorvastatin 40 MG tablet    LIPITOR    90 tablet    Take 1 tablet (40 mg) by mouth daily    Hyperlipidemia with target LDL less than 130       B-12 100 MCG Tabs           fluticasone 50 MCG/ACT spray    FLONASE    1 Bottle    Spray 1 spray into both nostrils daily    Chronic seasonal allergic rhinitis, unspecified trigger       LORazepam 1 MG tablet    ATIVAN    2 tablet    Take 30 minutes prior to MRI.  Do not operate a vehicle after taking this medication. May repeat x1 if needed    Tingling of right upper extremity       metoprolol 50 MG 24 hr tablet    TOPROL-XL    90 tablet    Take 1 tablet (50 mg) by mouth daily    Essential hypertension with goal blood pressure less than 140/90       OMEGA 3 PO      Take 2,400 mg by mouth daily        VITAMIN C PO      Take 500 mg by mouth daily

## 2017-12-06 NOTE — PATIENT INSTRUCTIONS
Plan:  - Today's Plan of Care:  MRI  Splints    -We also discussed other future treatment options:  Occupational therapy    Follow Up: At least 1-2 days following MRI

## 2017-12-11 ENCOUNTER — HOSPITAL ENCOUNTER (OUTPATIENT)
Dept: PHYSICAL THERAPY | Facility: CLINIC | Age: 52
Setting detail: THERAPIES SERIES
End: 2017-12-11
Attending: NURSE PRACTITIONER
Payer: COMMERCIAL

## 2017-12-11 PROCEDURE — 97110 THERAPEUTIC EXERCISES: CPT | Mod: GP | Performed by: PHYSICAL THERAPIST

## 2017-12-11 PROCEDURE — 40000718 ZZHC STATISTIC PT DEPARTMENT ORTHO VISIT: Performed by: PHYSICAL THERAPIST

## 2017-12-11 PROCEDURE — 97140 MANUAL THERAPY 1/> REGIONS: CPT | Mod: GP | Performed by: PHYSICAL THERAPIST

## 2017-12-13 ENCOUNTER — HOSPITAL ENCOUNTER (OUTPATIENT)
Dept: MRI IMAGING | Facility: CLINIC | Age: 52
Discharge: HOME | End: 2017-12-13
Attending: PEDIATRICS | Admitting: PEDIATRICS
Payer: COMMERCIAL

## 2017-12-13 DIAGNOSIS — M25.531 RIGHT WRIST PAIN: ICD-10-CM

## 2017-12-13 PROCEDURE — 73221 MRI JOINT UPR EXTREM W/O DYE: CPT | Mod: RT

## 2017-12-18 ENCOUNTER — MYC MEDICAL ADVICE (OUTPATIENT)
Dept: ORTHOPEDICS | Facility: CLINIC | Age: 52
End: 2017-12-18

## 2017-12-18 DIAGNOSIS — M65.941 TENOSYNOVITIS OF RIGHT HAND: ICD-10-CM

## 2017-12-18 DIAGNOSIS — M25.531 RIGHT WRIST PAIN: Primary | ICD-10-CM

## 2017-12-18 NOTE — TELEPHONE ENCOUNTER
Results for orders placed or performed during the hospital encounter of 12/13/17   MR Wrist Right w/o Contrast    Narrative    MR RIGHT WRIST WITHOUT CONTRAST  12/13/2017 7:46 AM    HISTORY:  Evaluate scaphoid fracture. Right wrist pain.    TECHNIQUE: Coronal T1, STIR, gradient echo.  Sagittal T1.  Axial T1  and T2 fat suppression.    COMPARISON: X-ray from 12/6/2017.    FINDINGS:   Osseous and Cartilaginous Structures: No arthritis or apparent  chondromalacia identified. No bone contusion or fracture.    Scapholunate and Lunotriquetral Ligaments: No tear identified.    Triangular Fibrocartilage Complex: No tear identified.    Extensor Tendons: There is soft tissue edema adjacent to the extensor  pollicis brevis and abductor pollicis longus tendons. This likely  represents tenosynovitis. Adjacent soft tissue swelling may be due to  a direct contusion as well. Recommend clinical correlation. No  evidence of tendon disruption.    Flexor Tendons: Unremarkable. No tear, tendinosis, or tenosynovitis is  identified.    Joint spaces: The radiocarpal, midcarpal, or distal radial ulnar joint  effusion.    Additional Findings: The carpal tunnel and median nerve appear  unremarkable. Guyon's canal is unremarkable. No ganglion cyst is  identified.       Impression    IMPRESSION:   1. There is fluid surrounding the extensor pollicis brevis and  abductor pollicis longus tendons on the radial aspect of the wrist,  likely representing tenosynovitis. Surrounding soft tissue edema may  be related to a direct contusion or may be reactive in relation to the  tenosynovitis.  2. There is no evidence of fracture. Specifically no scaphoid  fracture.    NAVARRO VALDERRAMA MD

## 2017-12-19 NOTE — TELEPHONE ENCOUNTER
Pt LVNIA, returning Chon's call. She can be reached at this number anytime before 3:30p today, and also tomorrow before 3:30p.

## 2017-12-19 NOTE — TELEPHONE ENCOUNTER
Please call patient with MRI results as outlined below:    IMPRESSION:   1. There is fluid surrounding the extensor pollicis brevis and  abductor pollicis longus tendons on the radial aspect of the wrist,  likely representing tenosynovitis. Surrounding soft tissue edema may  be related to a direct contusion or may be reactive in relation to the  tenosynovitis.  2. There is no evidence of fracture. Specifically no scaphoid  fracture.    In Summary:  - No evidence of fracture, no scaphoid fracture  - Tenosynovitis in extensor pollicis brevis and abductor pollicis longus tendons    I Recommend:  - Options for further treatment include: Continued bracing, referral to occupational therapy, trial of injection (would schedule US guidance with Dr. Louis), hand surgery referral.  - Patient is welcome to follow up with me to review results further if desired    Phuong Canas MD

## 2017-12-19 NOTE — TELEPHONE ENCOUNTER
Spoke with patient and relayed MRI results and recommendations per Dr. Canas's instructions. Patient would like to try OT (order placed). No further questions or actions required.    Garrett Baldwin, ATC

## 2017-12-31 ENCOUNTER — E-VISIT (OUTPATIENT)
Dept: FAMILY MEDICINE | Facility: CLINIC | Age: 52
End: 2017-12-31
Payer: COMMERCIAL

## 2017-12-31 DIAGNOSIS — J01.00 ACUTE NON-RECURRENT MAXILLARY SINUSITIS: Primary | ICD-10-CM

## 2017-12-31 PROCEDURE — 99444 ZZC PHYSICIAN ONLINE EVALUATION & MANAGEMENT SERVICE: CPT | Performed by: NURSE PRACTITIONER

## 2018-01-01 RX ORDER — SULFAMETHOXAZOLE/TRIMETHOPRIM 800-160 MG
1 TABLET ORAL 2 TIMES DAILY
Qty: 20 TABLET | Refills: 0 | Status: SHIPPED | OUTPATIENT
Start: 2018-01-01 | End: 2018-10-08

## 2018-01-11 ENCOUNTER — HOSPITAL ENCOUNTER (OUTPATIENT)
Dept: PHYSICAL THERAPY | Facility: CLINIC | Age: 53
Setting detail: THERAPIES SERIES
End: 2018-01-11
Attending: NURSE PRACTITIONER
Payer: COMMERCIAL

## 2018-01-11 ENCOUNTER — OFFICE VISIT (OUTPATIENT)
Dept: PSYCHIATRY | Facility: CLINIC | Age: 53
End: 2018-01-11
Payer: COMMERCIAL

## 2018-01-11 ENCOUNTER — HOSPITAL ENCOUNTER (OUTPATIENT)
Dept: OCCUPATIONAL THERAPY | Facility: CLINIC | Age: 53
Setting detail: THERAPIES SERIES
End: 2018-01-11
Attending: PEDIATRICS
Payer: COMMERCIAL

## 2018-01-11 VITALS
HEIGHT: 61 IN | BODY MASS INDEX: 26.1 KG/M2 | DIASTOLIC BLOOD PRESSURE: 76 MMHG | RESPIRATION RATE: 16 BRPM | SYSTOLIC BLOOD PRESSURE: 128 MMHG | TEMPERATURE: 97.6 F | HEART RATE: 84 BPM | WEIGHT: 138.25 LBS

## 2018-01-11 DIAGNOSIS — F33.1 MAJOR DEPRESSIVE DISORDER, RECURRENT EPISODE, MODERATE (H): Primary | ICD-10-CM

## 2018-01-11 DIAGNOSIS — F41.1 GAD (GENERALIZED ANXIETY DISORDER): ICD-10-CM

## 2018-01-11 PROCEDURE — 40000839 ZZH STATISTIC HAND THERAPY VISIT: Performed by: OCCUPATIONAL THERAPIST

## 2018-01-11 PROCEDURE — 97535 SELF CARE MNGMENT TRAINING: CPT | Mod: GO | Performed by: OCCUPATIONAL THERAPIST

## 2018-01-11 PROCEDURE — 97165 OT EVAL LOW COMPLEX 30 MIN: CPT | Mod: GO | Performed by: OCCUPATIONAL THERAPIST

## 2018-01-11 PROCEDURE — 97140 MANUAL THERAPY 1/> REGIONS: CPT | Mod: GP | Performed by: PHYSICAL THERAPIST

## 2018-01-11 PROCEDURE — 97035 APP MDLTY 1+ULTRASOUND EA 15: CPT | Mod: GO | Performed by: OCCUPATIONAL THERAPIST

## 2018-01-11 PROCEDURE — 90792 PSYCH DIAG EVAL W/MED SRVCS: CPT | Performed by: CLINICAL NURSE SPECIALIST

## 2018-01-11 PROCEDURE — 40000718 ZZHC STATISTIC PT DEPARTMENT ORTHO VISIT: Performed by: PHYSICAL THERAPIST

## 2018-01-11 PROCEDURE — L3906 WHO W/O JOINTS CF: HCPCS | Performed by: OCCUPATIONAL THERAPIST

## 2018-01-11 PROCEDURE — 97110 THERAPEUTIC EXERCISES: CPT | Mod: GP | Performed by: PHYSICAL THERAPIST

## 2018-01-11 RX ORDER — TRAZODONE HYDROCHLORIDE 50 MG/1
25-50 TABLET, FILM COATED ORAL
Qty: 30 TABLET | Refills: 1 | Status: SHIPPED | OUTPATIENT
Start: 2018-01-11 | End: 2018-11-23

## 2018-01-11 RX ORDER — DULOXETIN HYDROCHLORIDE 20 MG/1
20 CAPSULE, DELAYED RELEASE ORAL 2 TIMES DAILY
Qty: 60 CAPSULE | Refills: 1 | Status: SHIPPED | OUTPATIENT
Start: 2018-01-11 | End: 2018-10-08

## 2018-01-11 ASSESSMENT — ANXIETY QUESTIONNAIRES
2. NOT BEING ABLE TO STOP OR CONTROL WORRYING: NEARLY EVERY DAY
IF YOU CHECKED OFF ANY PROBLEMS ON THIS QUESTIONNAIRE, HOW DIFFICULT HAVE THESE PROBLEMS MADE IT FOR YOU TO DO YOUR WORK, TAKE CARE OF THINGS AT HOME, OR GET ALONG WITH OTHER PEOPLE: VERY DIFFICULT
6. BECOMING EASILY ANNOYED OR IRRITABLE: NEARLY EVERY DAY
5. BEING SO RESTLESS THAT IT IS HARD TO SIT STILL: MORE THAN HALF THE DAYS
1. FEELING NERVOUS, ANXIOUS, OR ON EDGE: NEARLY EVERY DAY
7. FEELING AFRAID AS IF SOMETHING AWFUL MIGHT HAPPEN: MORE THAN HALF THE DAYS
GAD7 TOTAL SCORE: 19
3. WORRYING TOO MUCH ABOUT DIFFERENT THINGS: NEARLY EVERY DAY

## 2018-01-11 ASSESSMENT — PATIENT HEALTH QUESTIONNAIRE - PHQ9
SUM OF ALL RESPONSES TO PHQ QUESTIONS 1-9: 11
5. POOR APPETITE OR OVEREATING: NEARLY EVERY DAY

## 2018-01-11 NOTE — NURSING NOTE
"Chief Complaint   Patient presents with     Consult       Initial /76  Pulse 84  Temp 97.6  F (36.4  C) (Tympanic)  Resp 16  Ht 5' 1\" (1.549 m)  Wt 138 lb 4 oz (62.7 kg)  BMI 26.12 kg/m2 Estimated body mass index is 26.12 kg/(m^2) as calculated from the following:    Height as of this encounter: 5' 1\" (1.549 m).    Weight as of this encounter: 138 lb 4 oz (62.7 kg).    Medication Reconciliation:  complete    Sarah Anguiano CMA (AAMA)  "
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
## 2018-01-11 NOTE — MR AVS SNAPSHOT
After Visit Summary   1/11/2018    Elizabeth Santizo    MRN: [id number]           Patient Information     Date Of Birth          1965        Visit Information        Provider Department      1/11/2018 2:15 PM Augustina Bashir APRN [facility]        Today's Diagnoses     Major depressive disorder, recurrent episode, moderate (H)    -  1    PATRICIA (generalized anxiety disorder)          Care Instructions    Treatment Plan:    Begin duloxetine (Cymbalta) 20 mg daily.     Begin trazodone (Desyrel) 25-50 mg as needed at bedtime.     Begin Vitamin 1000 IUs per day.    Begin individual therapy.     Follow up in one month.     - Recommend patient discuss medications with their pharmacist. Risks and benefits of medications discussed, including side effect profile.   - Safety plan was reviewed; to the ER as needed or call after hours crisis line; [phone]  - Education and counseling was done regarding use of medications, psychotherapy options  - Call [phone] for appointment or to speak to a nurse.   -Office hours: Monday through Thursday 8:00 am to 4:30 pm.             Follow-ups after your visit        Your next 10 appointments already scheduled     Dylon 15, 2018  7:00 AM CST   Hand Treatment with Madhavitaya Bravobeck, OT   [facility] ([facility])    [address]  [place] [facility] [place] [address]   [phone]            Jan 18, 2018  7:00 AM CST   Hand Treatment with Madhavi Thhenribeck, OT   [facility] ([facility])    [address]  [place] [facility] [place] [address]   [phone]            Feb 05, 2018  3:00 PM CST   New Visit with Garrett Rueda, [facility] ([facility]    [address]  [place] [address]   [phone]            Feb 26, 2018 12:00 PM CST   Return Visit with Garrett Rueda [facility]  "[facility] ([facility])    [address]  [facility] [id number] [phone]              Who to contact     If you have questions or need follow up information about today's clinic visit or your schedule please contact [facility] directly at [phone].  Normal or non-critical lab and imaging results will be communicated to you by MyChart, letter or phone within 4 business days after the clinic has received the results. If you do not hear from us within 7 days, please contact the clinic through [employer] or phone. If you have a critical or abnormal lab result, we will notify you by phone as soon as possible.  Submit refill requests through [employer] or call your pharmacy and they will forward the refill request to us. Please allow 3 business days for your refill to be completed.          Additional Information About Your Visit        MyChart Information     [employer] gives you secure access to your electronic health record. If you see a primary care provider, you can also send messages to your care team and make appointments. If you have questions, please call your primary care clinic.  If you do not have a primary care provider, please call [phone] and they will assist you.        Care EveryWhere ID     This is your Care EveryWhere ID. This could be used by other organizations to access your [place] medical records  [id number]        Your Vitals Were     Pulse Temperature Respirations Height BMI (Body Mass Index)       84 97.6  F (36.4  C) (Tympanic) 16 5' 1\" (1.549 m) 26.12 kg/m2        Blood Pressure from Last 3 Encounters:   01/11/18 128/76   12/06/17 (!) 139/91   10/30/17 126/82    Weight from Last 3 Encounters:   01/11/18 138 lb 4 oz (62.7 kg)   12/06/17 137 lb 8 oz (62.4 kg)   10/30/17 136 lb (61.7 kg)              Today, you had the following     No orders found for display         Today's Medication Changes          These changes are accurate as " of: 1/11/18  3:10 PM.  If you have any questions, ask your nurse or doctor.               Start taking these medicines.        Dose/Directions    DULoxetine 20 MG EC capsule   Commonly known as:  CYMBALTA   Used for:  Major depressive disorder, recurrent episode, moderate (H), PATRICIA (generalized anxiety disorder)        Dose:  20 mg   Take 1 capsule (20 mg) by mouth 2 times daily   Quantity:  60 capsule   Refills:  1       traZODone 50 MG tablet   Commonly known as:  DESYREL   Used for:  Major depressive disorder, recurrent episode, moderate (H), PATRICIA (generalized anxiety disorder)        Dose:  25-50 mg   Take 0.5-1 tablets (25-50 mg) by mouth nightly as needed for sleep   Quantity:  30 tablet   Refills:  1            Where to get your medicines      These medications were sent to [facility] [address]  [address] [place] [address]     Phone:  [phone]     DULoxetine 20 MG EC capsule    traZODone 50 MG tablet                Primary Care Provider Office Phone # Fax #    Ilana Hendrix Sintia Mares, SOLANGE [facility] [phone]       [address] [address]        Equal Access to Services     JOSELO WHITFIELD : Hadii tom ku hadasho Socarmenali, waaxda luqadaha, qaybta kaalmada adeegyada, ledy jaime. So [facility] [phone].    ATENCIÓN: Si habla español, tiene a jimenez disposición servicios gratuitos de asistencia lingüística. Chelle al [phone].    We comply with applicable federal civil rights laws and [place] laws. We do not discriminate on the basis of race, color, national origin, age, disability, sex, sexual orientation, or gender identity.            Thank you!     Thank you for choosing [facility]  for your care. Our goal is always to provide you with excellent care. Hearing back from our patients is one way we can continue to improve our services. Please take a few minutes to complete the written survey  that you may receive in the mail after your visit with us. Thank you!             Your Updated Medication List - Protect others around you: Learn how to safely use, store and throw away your medicines at www.disposemymeds.org.          This list is accurate as of: 1/11/18  3:10 PM.  Always use your most recent med list.                   Brand Name Dispense Instructions for use Diagnosis    atorvastatin 40 MG tablet    LIPITOR    90 tablet    Take 1 tablet (40 mg) by mouth daily    Hyperlipidemia with target LDL less than 130       B-12 100 MCG Tabs           DULoxetine 20 MG EC capsule    CYMBALTA    60 capsule    Take 1 capsule (20 mg) by mouth 2 times daily    Major depressive disorder, recurrent episode, moderate (H), PATRICIA (generalized anxiety disorder)       fluticasone 50 MCG/ACT spray    FLONASE    1 Bottle    Spray 1 spray into both nostrils daily    Chronic seasonal allergic rhinitis, unspecified trigger       metoprolol succinate 50 MG 24 hr tablet    TOPROL-XL    90 tablet    Take 1 tablet (50 mg) by mouth daily    Essential hypertension with goal blood pressure less than 140/90       OMEGA 3 PO      Take 2,400 mg by mouth daily        order for DME     1 Device    Equipment being ordered: thumb splint universal    Injury of right wrist, initial encounter       sulfamethoxazole-trimethoprim 800-160 MG per tablet    BACTRIM DS/SEPTRA DS    20 tablet    Take 1 tablet by mouth 2 times daily    Acute non-recurrent maxillary sinusitis       traZODone 50 MG tablet    DESYREL    30 tablet    Take 0.5-1 tablets (25-50 mg) by mouth nightly as needed for sleep    Major depressive disorder, recurrent episode, moderate (H), PATRICIA (generalized anxiety disorder)       VITAMIN C PO      Take 500 mg by mouth daily

## 2018-01-11 NOTE — PATIENT INSTRUCTIONS
Treatment Plan:    Begin duloxetine (Cymbalta) 20 mg daily.     Begin trazodone (Desyrel) 25-50 mg as needed at bedtime.     Begin Vitamin 1000 IUs per day.    Begin individual therapy.     Follow up in one month.     - Recommend patient discuss medications with their pharmacist. Risks and benefits of medications discussed, including side effect profile.   - Safety plan was reviewed; to the ER as needed or call after hours crisis line; [phone]  - Education and counseling was done regarding use of medications, psychotherapy options  - Call [phone] for appointment or to speak to a nurse.   -Office hours: Monday through Thursday 8:00 am to 4:30 pm.

## 2018-01-11 NOTE — PROGRESS NOTES
01/11/18   Hand Therapy Evaluation   Quick Adds   Quick Adds Fall Risk Screen   General Information/History   Start Of Care Date 01/11/18   Referring Physician Dr. Canas   Orders Evaluate And Treat As Indicated   Orders Date 12/19/17   Medical Diagnosis Right wrist pain- Tenosynovitis   Additional Occupational Profile Info/Pertinent history of current problem Patient relates she fell in mid October and fell and hand hit ground and di d not have immediate pain but by that Sunday had pain in thumb that progressively got worse..   Previous treatment or current condition Hand based splint   Past medical history see chart- nothing listed   How/Where did it occur With a fall   Onset date of current episode/exacerbation 10/18/17   Chronicity New   Hand Dominance Right   Affected side Right   Functional limitations perform activities of daily living;perform required work activities;perform desired leisure / sports activities   Reported Symptoms Pain;Edema   Prior level of function Independent ADL   Important Activities strength band training, lifting weights, working out   Occupation    Employment Status Working in normal job without restrictions   Primary Job Tasks Keyboarding;Using a mouse;Prolonged sitting   Occupation Comments writing   Patient/Family goals statement Patient would like to be able to work out lift weights and write without pain.,   Fall Risk Screen   Fall screen completed by OT   Have you fallen 2 or more times in the past year? No   Have you fallen and had an injury in the past year? Yes   Is patient a fall risk? No   Pain   Pain Primary Pain Report   Primary Pain Report   Location radial wrist and thumb   Radiation (mid forearm)   Pain Quality Sharp;Shooting;Aching   Frequency Intermittent   Scale 3/10;10/10   Pain Is Exacerbated By Lifting;Carrying;Pinching;Pushing;Lying On Extremity;Gripping;Twisting , Pulling;Activity/movement   Pain Is Relieved By  Splints;Immobilization;Nothing   Progression Since Onset Unchanged   Edema   Edema Distal Wrist Crease   Distal Wrist Crease (measured in cm)   Distal Wrist Crease - - Left 14.5   Distal Wrist Crease - - Right 15   Tenderness   Tenderness First Dorsal   First Dorsal   Right Severe   Palpation   Palpation Assessed   Right Hand Tenderness Present At: 1st Dorsal Compartment  (7/10)   ROM   ROM AROM   AROM   Comments WNL   Special Tests   Special Tests Assessed   Right Hand Positive For The Following Special Tests Finkelstein   Right Hand Special Tests Comments 10/10 pain   Sensation Findings   Sensation Findings Other (see comments)   Sensation Comments No sensory complaints   Strength   Strength Strength;Other (see comments)    Avg - Left 53    Avg - Right 43 mild pain   Lateral Pinch - Left 13   Lateral Pinch - Right 13   3 Point Pinch - Left 12   3 Point Pinch - Right 12#   Strength Comments Increased pain with resistance to EPB   Education Assessment   Preferred Learning Style Listening;Reading;Demonstration   Barriers to Learning No barriers   Therapy Interventions   Planned Therapy Interventions Ultrasound;Iontophoresis (list drug);Light Therapy;Fluidotherapy;Strengthening;ROM;Stretching;Manual Therapy;Splinting;Education of splint wear, care, fit and precautions;Desensitization;Self Care/Home Management;Home Program;Ergonomic Patient Education   Therapy plan comments To be added as appropriate   Clinical Impression   Criteria for Skilled Therapeutic Interventions Met yes   OT Diagnosis Decreased ADL's   Influenced by the following impairments Pain;Edema;Decreased strength   Assessment of Occupational Performance 5 or more Performance Deficits   Identified Performance Deficits housework, hobbies.  - lifting weights, weight bearing activities, buttoning opening jar   Clinical Decision Making (Complexity) Low complexity   Therapy Frequency 2x/week   Predicted Duration of Therapy Intervention (days/wks) 6  weeks   Risks and Benefits of Treatment have been explained. Yes   Patient, Family & other staff in agreement with plan of care Yes   Clinical Impression Comments Clinical findings consistent with Tenosynovitis diagnosis of first dorsal compartment as diagnosed. Anticipate patient will benefit from skilled hand therapy to meet functional goals.   Hand Goals   Hand Goals Household Chores;Sports/Recreation   Household Chores   Current Functional Task Gripping   Previous Performance Level Independent   Current Performance Level Unable   Goal Target Task Open a tight or new jar   Goal Target Performance Level Mild difficulty  (less than 2/10 pain)   Due Date 02/26/18   Sports/Recreation   Current Functional Task Gripping   Previous Performance Level Independent   Current Performance Level Unable   Goal Target Task Weight bear through hand; bar  (to lift weights)   Goal Target Performance Level Mild difficulty   Due Date 02/26/18   Total Evaluation Time   Madhavi Jones OTR/L CHT  Occupational Therapist, Certified Hand Therapist

## 2018-01-12 ASSESSMENT — ANXIETY QUESTIONNAIRES: GAD7 TOTAL SCORE: 19

## 2018-02-05 ENCOUNTER — OFFICE VISIT (OUTPATIENT)
Dept: PSYCHOLOGY | Facility: CLINIC | Age: 53
End: 2018-02-05
Payer: COMMERCIAL

## 2018-02-05 DIAGNOSIS — F41.1 GENERALIZED ANXIETY DISORDER: ICD-10-CM

## 2018-02-05 DIAGNOSIS — F33.1 MAJOR DEPRESSIVE DISORDER, RECURRENT EPISODE, MODERATE (H): Primary | ICD-10-CM

## 2018-02-05 PROCEDURE — 90791 PSYCH DIAGNOSTIC EVALUATION: CPT | Performed by: SOCIAL WORKER

## 2018-02-05 ASSESSMENT — ANXIETY QUESTIONNAIRES
IF YOU CHECKED OFF ANY PROBLEMS ON THIS QUESTIONNAIRE, HOW DIFFICULT HAVE THESE PROBLEMS MADE IT FOR YOU TO DO YOUR WORK, TAKE CARE OF THINGS AT HOME, OR GET ALONG WITH OTHER PEOPLE: SOMEWHAT DIFFICULT
1. FEELING NERVOUS, ANXIOUS, OR ON EDGE: MORE THAN HALF THE DAYS
2. NOT BEING ABLE TO STOP OR CONTROL WORRYING: MORE THAN HALF THE DAYS
6. BECOMING EASILY ANNOYED OR IRRITABLE: SEVERAL DAYS
3. WORRYING TOO MUCH ABOUT DIFFERENT THINGS: MORE THAN HALF THE DAYS
7. FEELING AFRAID AS IF SOMETHING AWFUL MIGHT HAPPEN: SEVERAL DAYS
5. BEING SO RESTLESS THAT IT IS HARD TO SIT STILL: NOT AT ALL
GAD7 TOTAL SCORE: 10

## 2018-02-05 ASSESSMENT — PATIENT HEALTH QUESTIONNAIRE - PHQ9: 5. POOR APPETITE OR OVEREATING: MORE THAN HALF THE DAYS

## 2018-02-05 NOTE — PROGRESS NOTES
"                                                                                                                                                                        Adult Intake Structured Interview  Standard Diagnostic Assessment      CLIENT'S NAME: Elizabeth Satnizo  MRN:   [id number]  :   1965  ACCT. NUMBER: [id number]  DATE OF SERVICE: 18      Identifying Information:  Client is a 52 year old, ,  female. Client was referred for counseling by self. Client is currently employed full time. Client attended the session alone.        Client's Statement of Presenting Concern:  Client reports the reason for seeking therapy at this time as \"anxiety. Family issues, sadness, unhappiness\".  Client stated that her symptoms have resulted in the following functional impairments: management of the household and or completion of tasks and self-care      History of Presenting Concern:  Client reports that these problem(s) began 2-3 years ago and worsened when the  holiday went poorly and now there are family rifts. Client has attempted to resolve these concerns in the past through \"I have tried a few different anxiety medications but they either didn't seem to work or had horrible side effects\". Client reports that other professional(s) are involved in providing support / services. She is working with her PCP.      Social History:  Client reported she grew up in \"grade school-[employer];  high/senior high school-[place], [place]\". They were the first born of 2 children. \"never -didn't know my father, raised by my maternal grandmother\". Client reported that her childhood was \"happy, very happy until 16 yrs old\". Client described her current relationships with family of origin as \"right now- horrible\".    Client reported a history of 3 marriages. Client has been  for 12 years. Client reported having 2 bio children and 3 step children. Client identified some stable and " "meaningful social connections. Client reported that she has been involved with the legal system. She wrote custody issues in first marriage.  Client's highest education level was \"vocational/diploma beyond high school for word processing\". Client did not identify any learning problems. There are no ethnic, cultural or Taoism factors that may be relevant for therapy. Client identified her preferred language to be English. Client reported she does not need the assistance of an  or other support involved in therapy. Modifications will not be used to assist communication in therapy. Client did not serve in the .      Client reports family history includes C.A.D. in her father; CANCER in her maternal aunt; CEREBROVASCULAR DISEASE in her father and maternal grandmother; DIABETES in her cousin, maternal grandmother, mother, and another family member; Hyperlipidemia in her maternal grandmother; Hypertension in her maternal grandmother; Other Cancer in an other family member. There is no history of Breast Cancer or Cancer - colorectal.    Mental Health History:  Client reported no family history of mental health issues.  Client previously received the following mental health diagnosis: Anxiety and Depression.  Client has received the following mental health services in the past: medication(s) from physician / PCP.  Hospitalizations: None.  Client is currently receiving the following services: medication(s) from physician / PCP.       Chemical Health History:  Client reported the following biological family members or relatives with chemical health issues: Father reportedly used alcohol . Client has not received chemical dependency treatment in the past. Client is not currently receiving any chemical dependency treatment. Client reports no problems as a result of their drinking / drug use.       Client Reports:  Client reports using alcohol 2 times per week and has 2 glasses of wine at a time. Client " "first started drinking at age \"this quantity- age 36\".  Client denies using tobacco.  Client denies using marijuana.  Client reports using caffeine 2 times per day and drinks 1 at a time. Client started using caffeine at age \"high school\".  Client denies using street drugs.  Client denies the non-medical use of prescription or over the counter drugs.    CAGE: C     Patient felt they ought to CUT down on your drinking (or drug use).   Based on the negative Cage-Aid score and clinical interview there  are not indications of drug or alcohol abuse.    Discussed the general effects of drugs and alcohol on health and well-being. Therapist gave client printed information about the effects of chemical use on her health and well being.      Significant Losses / Trauma / Abuse / Neglect Issues:  There are indications or report of significant loss, trauma, abuse or neglect issues related to: death of maternal grandmother and mother and divorce / relational changes 2 divorces.    Issues of possible neglect are not present.      Medical Issues:  Client has not had a physical exam to rule out medical causes for current symptoms. Date of last physical exam was within the past year. Client was encouraged to follow up with PCP if symptoms were to develop. The client has a [place] Primary Care Provider, who is named Ilana Mares.. The client reports not having a psychiatrist. Client reports no current medical concerns. The client reports the presence of chronic or episodic pain in the form of neck and hand. The pain level is mild and has a frequency of varies. There are significant nutritional concerns. She circled the work \"weight\".    Client reports current meds as:   Current Outpatient Prescriptions   Medication Sig     DULoxetine (CYMBALTA) 20 MG EC capsule Take 1 capsule (20 mg) by mouth 2 times daily     traZODone (DESYREL) 50 MG tablet Take 0.5-1 tablets (25-50 mg) by mouth nightly as needed for sleep     " sulfamethoxazole-trimethoprim (BACTRIM DS/SEPTRA DS) 800-160 MG per tablet Take 1 tablet by mouth 2 times daily     order for DME Equipment being ordered: thumb splint universal     Cyanocobalamin (B-12) 100 MCG TABS      metoprolol (TOPROL-XL) 50 MG 24 hr tablet Take 1 tablet (50 mg) by mouth daily     atorvastatin (LIPITOR) 40 MG tablet Take 1 tablet (40 mg) by mouth daily     fluticasone (FLONASE) 50 MCG/ACT spray Spray 1 spray into both nostrils daily     Omega-3 Fatty Acids (OMEGA 3 PO) Take 2,400 mg by mouth daily     Ascorbic Acid (VITAMIN C PO) Take 500 mg by mouth daily      No current facility-administered medications for this visit.        Client Allergies:  Allergies   Allergen Reactions     Amoxicillin Rash     no known allergies to medications    Medical History:  Past Medical History:   Diagnosis Date     Generalized headaches      Hyperlipidemia      Hypertension      Impaired fasting glucose      Overweight(278.02)          Medication Adherence:  N/A - Client does not have prescribed psychiatric medications.    Client was provided recommendation to follow-up with prescribing physician.    Mental Status Assessment:  Appearance:   Appropriate   Eye Contact:   Good   Psychomotor Behavior: Normal   Attitude:   Cooperative   Orientation:   All  Speech   Rate / Production: Normal    Volume:  Normal   Mood:    Anxious  Depressed  Normal  Affect:    Appropriate   Thought Content:  Clear   Thought Form:  Coherent  Logical   Insight:    Good       Review of Symptoms:  Depression: Sleep Interest Guilt Helpless Ruminations Irritability  Lisandra:  No symptoms  Psychosis: No symptoms  Anxiety: Worries Nervousness  Panic:  No symptoms  Post Traumatic Stress Disorder: Trauma more information of symptoms needed  Obsessive Compulsive Disorder: No symptoms  Eating Disorder: No symptoms  Oppositional Defiant Disorder: No symptoms  ADD / ADHD: No symptoms  Conduct Disorder: No symptoms      Safety Assessment:    History  "of Safety Concerns:   Client denied a history of suicidal ideation.    Client denied a history of suicide attempts.    Client denied a history of homicidal ideation.    Client denied a history of self-injurious ideation and behaviors.    Client denied a history of personal safety concerns.    Client denied a history of assaultive behaviors.        Current Safety Concerns:  Client denies current suicidal ideation.    Client denies current homicidal ideation and behaviors.  Client denies current self-injurious ideation and behaviors.    Client denies current concerns for personal safety.      Client reports there are firearms in the house. The firearms are secured in a locked space.     Plan for Safety and Risk Management:  A safety and risk management plan has not been developed at this time, however client was given the after-hours number / 911 should there be a change in any of these risk factors.    Client's Strengths and Limitations:  Client identified the following strengths or resources that will help her succeed in counseling: commitment to health and well being. Client identified the following supports: \"no one\". Things that may interfere with the client's success in counseling include: \"fear of failure. who to talk to outside of counseling\".      Diagnostic Criteria:  A. Excessive anxiety and worry about a number of events or activities (such as work or school performance).   B. The person finds it difficult to control the worry.  C. Select 3 or more symptoms (required for diagnosis). Only one item is required in children.   - Restlessness or feeling keyed up or on edge.    - Irritability.    - Sleep disturbance (difficulty falling or staying asleep, or restless unsatisfying sleep).   D. The focus of the anxiety and worry is not confined to features of an Axis I disorder.  E. The anxiety, worry, or physical symptoms cause clinically significant distress or impairment in social, occupational, or other " important areas of functioning.   F. The disturbance is not due to the direct physiological effects of a substance (e.g., a drug of abuse, a medication) or a general medical condition (e.g., hyperthyroidism) and does not occur exclusively during a Mood Disorder, a Psychotic Disorder, or a Pervasive Developmental Disorder.    - The aformentioned symptoms began several month(s) ago and occurs 5 days per week and is experienced as moderate.  CRITERIA (A-C) REPRESENT A MAJOR DEPRESSIVE EPISODE - SELECT THESE CRITERIA  A) Recurrent episode(s) - symptoms have been present during the same 2-week period and represent a change from previous functioning 5 or more symptoms (required for diagnosis)   - Depressed mood. Note: In children and adolescents, can be irritable mood.     - Diminished interest or pleasure in all, or almost all, activities.    - Significant weight gainincrease in appetite.    - Increased sleep.    - Feelings of worthlessness or excessive guilt.   B) The symptoms cause clinically significant distress or impairment in social, occupational, or other important areas of functioning  C) The episode is not attributable to the physiological effects of a substance or to another medical condition  D) The occurence of major depressive episode is not better explained by other thought / psychotic disorders  E) There has never been a manic episode or hypomanic episode      Functional Status:  Client's symptoms are causing reduced functional status in the following areas: Activities of Daily Living - chores and hygiene      DSM5 Diagnoses: (Sustained by DSM5 Criteria Listed Above)  Diagnoses: 296.32 (F33.1) Major Depressive Disorder, Recurrent Episode, Moderate _ and With anxious distress  300.02 (F41.1) Generalized Anxiety Disorder  Psychosocial & Contextual Factors: past trauma experiences. Blended family.  WHODAS 2.0 (12 item)            This questionnaire asks about difficulties due to health conditions. Health  conditions  include  disease or illnesses, other health problems that may be short or long lasting,  injuries, mental health or emotional problems, and problems with alcohol or drugs.                     Think back over the past 30 days and answer these questions, thinking about how much  difficulty you had doing the following activities. For each question, please [place] only  one response.    S1 Standing for long periods such as 30 minutes? None =         1   S2 Taking care of household responsibilities? Mild =           2   S3 Learning a new task, for example, learning how to get to a new place? None =         1   S4 How much of a problem do you have joining community activities (for example, festivals, Anabaptist or other activities) in the same way as anyone else can? None =         1   S5 How much have you been emotionally affected by your health problems? None =         1     In the past 30 days, how much difficulty did you have in:   S6 Concentrating on doing something for ten minutes? None =         1   S7 Walking a long distance such as a kilometer (or equivalent)? None =         1   S8 Washing your whole body? Mild =           2   S9 Getting dressed? Mild =           2   S10 Dealing with people you do not know? None =         1   S11 Maintaining a friendship? None =         1   S12 Your day to day work? Mild =           2     H1 Overall, in the past 30 days, how many days were these difficulties present? Record number of days 15   H2 In the past 30 days, for how many days were you totally unable to carry out your usual activities or work because of any health condition? Record number of days  0   H3 In the past 30 days, not counting the days that you were totally unable, for how many days did you cut back or reduce your usual activities or work because of any health condition? Record number of days 0     Attendance Agreement:  Client has signed Attendance Agreement:Yes      Collaboration:  Collaboration  with other professionals is not indicated at this time.      Preliminary Treatment Plan:  The client reports no currently identified Confucianist, ethnic or cultural issues relevant to therapy.     services are not indicated.    Modifications to assist communication are not indicated.    The concerns identified by the client will be addressed in therapy.    Initial Treatment will focus on: Depressed Mood - major depression  Anxiety - generalized anxiety  Relational Problems related to: Conflict or difficulties with blended family members.    As a preliminary treatment goal, client will develop more effective coping skills to manage depressive symptoms, will develop more effective coping skills to manage anxiety symptoms, will address relationship difficulties in a more adaptive manner and will learn and practice positive anger management skills .    The focus of initial interventions will be to alleviate anxiety, alleviate depressed mood, increase coping skills, process traumas, provide family education, teach relaxation strategies and teach stress mangement techniques.    Referral to another professional/service is not indicated at this time.    A Release of Information is not needed at this time.    Report to child / adult protection services was NA.    Client will have access to their [facility]' medical record.    Garrett Rueda, TK  February 5, 2018

## 2018-02-05 NOTE — MR AVS SNAPSHOT
MRN:[id number]                      After Visit Summary   2/5/2018    Elizabeth Santizo    MRN: [id number]           Visit Information        Provider Department      2/5/2018 3:00 PM Garrett Rueda, [facility] Generic      Your next 10 appointments already scheduled     Feb 22, 2018  9:00 AM CST   [facility]    [address] [id number] [phone]            Mar 08, 2018  9:00 AM CST   Return Visit with Garrett Rueda [facility] ([facility])    [address] [id number] [phone]              MyChart Information     [employer] gives you secure access to your electronic health record. If you see a primary care provider, you can also send messages to your care team and make appointments. If you have questions, please call your primary care clinic.  If you do not have a primary care provider, please call [phone] and they will assist you.        Care EveryWhere ID     This is your Care EveryWhere ID. This could be used by other organizations to access your [place] medical records  [id number]        Equal Access to Services     JOSELO WHITFIELD : Allen merazo Socarmenali, waaxda luqadaha, qaybta kaalmada adeegyada, ledy jaime. So [facility] [phone].    ATENCIÓN: Si habla español, tiene a jimenez disposición servicios gratuitos de asistencia lingüística. Llemanuel al [phone].    We comply with applicable federal civil rights laws and [place] laws. We do not discriminate on the basis of race, color, national origin, age, disability, sex, sexual orientation, or gender identity.

## 2018-02-06 ASSESSMENT — ANXIETY QUESTIONNAIRES: GAD7 TOTAL SCORE: 10

## 2018-02-06 ASSESSMENT — PATIENT HEALTH QUESTIONNAIRE - PHQ9: SUM OF ALL RESPONSES TO PHQ QUESTIONS 1-9: 4

## 2018-02-14 NOTE — PROGRESS NOTES
Elizabeth Anjaliaurelia Santizo   PHYSICAL THERAPY DISCHARGE  02/14/18 0900   Signing Clinician's Name / Credentials   Signing clinician's name / credentials Lacey Cheney, PT, DPT   Session Number   Session Number 3 medica   Adult Goals   PT Ortho Eval Goals 1;2   Ortho Goal 1   Goal Description pt will  not wake from neck or UE sx in 4wk   Target Date 01/03/18   Ortho Goal 2   Goal Description pt will tolerate 60-90 min computer lucio without UE sx in 4wk   Target Date 01/11/18   Date Met 01/11/18   Subjective Report   Subjective Report The neck pain is much better.  No longer experiencing arm tingling.     Objective Measures   Objective Measures Objective Measure 1;Objective Measure 2   Objective Measure 1   Objective Measure Cervical AROM   Details Sidebend left=30 deg; sidebend right=40 deg   Objective Measure 2   Objective Measure UE symptoms   Details right=gone; left=lateral shoulder and lateral forearm intermittant   Treatment Interventions   Interventions Manual Therapy;Therapeutic Procedure/Exercise   Therapeutic Procedure/exercise   Minutes 10   Skilled Intervention Mobility; pain relief; HEP instruction   Patient Response Felt yonatan pec stretch with thoracic rotation   Treatment Detail 1. Manual left upper trap stretch plus contract-relax 5 sec 5 reps   2. Sidelying thoracic rotation x 15 reps yonatan    3. K tape I cut for yonatan upper trap tone, pt edu where to buy and how to apply at home   Manual Therapy   Minutes 20   Skilled Intervention Pain relief; mobility; cervical decompression   Patient Response Reduced pain   Treatment Detail 1. Suboccipital release x 2     2. Manual cervical traction with right flxn preference, straight sagittal   3. Left 2nd rib mob inferior gr. III    4.    Plan   Home program Yonatan levator scap stretch ~every hour at work x 30 sec; sidelying thoracic rotation stretch x 15 reps yonatan   Plan for next session Hold on PT - chart open x 8 weeks and pt only to return if needed    Pt had not returned,  will DC this episode of care   Total Session Time   Timed Code Treatment Minutes 30   Total Treatment Time (sum of timed and untimed services) 30 min (1 ther ex; 1 manual)   Kris Hoenk, PT #[id number]  [facility]

## 2018-02-14 NOTE — ADDENDUM NOTE
Encounter addended by: Hoenk, Kris, PT on: 2/14/2018 10:36 AM<BR>     Actions taken: Sign clinical note, Flowsheet accepted, Episode resolved

## 2018-02-22 ENCOUNTER — OFFICE VISIT (OUTPATIENT)
Dept: PSYCHOLOGY | Facility: CLINIC | Age: 53
End: 2018-02-22
Payer: COMMERCIAL

## 2018-02-22 DIAGNOSIS — F33.1 MAJOR DEPRESSIVE DISORDER, RECURRENT EPISODE, MODERATE (H): Primary | ICD-10-CM

## 2018-02-22 DIAGNOSIS — F41.1 GENERALIZED ANXIETY DISORDER: ICD-10-CM

## 2018-02-22 PROCEDURE — 90834 PSYTX W PT 45 MINUTES: CPT | Performed by: SOCIAL WORKER

## 2018-02-22 ASSESSMENT — ANXIETY QUESTIONNAIRES
5. BEING SO RESTLESS THAT IT IS HARD TO SIT STILL: NOT AT ALL
6. BECOMING EASILY ANNOYED OR IRRITABLE: SEVERAL DAYS
GAD7 TOTAL SCORE: 9
IF YOU CHECKED OFF ANY PROBLEMS ON THIS QUESTIONNAIRE, HOW DIFFICULT HAVE THESE PROBLEMS MADE IT FOR YOU TO DO YOUR WORK, TAKE CARE OF THINGS AT HOME, OR GET ALONG WITH OTHER PEOPLE: SOMEWHAT DIFFICULT
7. FEELING AFRAID AS IF SOMETHING AWFUL MIGHT HAPPEN: SEVERAL DAYS
1. FEELING NERVOUS, ANXIOUS, OR ON EDGE: MORE THAN HALF THE DAYS
2. NOT BEING ABLE TO STOP OR CONTROL WORRYING: MORE THAN HALF THE DAYS
3. WORRYING TOO MUCH ABOUT DIFFERENT THINGS: MORE THAN HALF THE DAYS

## 2018-02-22 ASSESSMENT — PATIENT HEALTH QUESTIONNAIRE - PHQ9: 5. POOR APPETITE OR OVEREATING: SEVERAL DAYS

## 2018-02-22 NOTE — MR AVS SNAPSHOT
MRN:[id number]                      After Visit Summary   2/22/2018    Elizabeth Santizo    MRN: [id number]           Visit Information        Provider Department      2/22/2018 9:00 AM Murray Garrett S [facility] Generic      Your next 10 appointments already scheduled     Mar 08, 2018  9:00 AM CST   Return Visit with Garrett Rueda [facility] ([facility])    [address] [phone] [phone]            Mar 23, 2018  9:00 AM CDT   Return Visit with Garrett Rueda [facility] ([facility])    [address] [id number] [phone]              MyChart Information     [employer] gives you secure access to your electronic health record. If you see a primary care provider, you can also send messages to your care team and make appointments. If you have questions, please call your primary care clinic.  If you do not have a primary care provider, please call [phone] and they will assist you.        Care EveryWhere ID     This is your Care EveryWhere ID. This could be used by other organizations to access your [place] medical records  [id number]        Equal Access to Services     JOSELO WHITFIELD : Allen merazo Sonena, waaxda luqadaha, qaybta kaalmada adeegyada, ledy jaime. So [facility] [phone].    ATENCIÓN: Si habla español, tiene a jimenez disposición servicios gratuitos de asistencia lingüística. Llemanuel al [phone].    We comply with applicable federal civil rights laws and [place] laws. We do not discriminate on the basis of race, color, national origin, age, disability, sex, sexual orientation, or gender identity.
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
## 2018-02-23 ASSESSMENT — ANXIETY QUESTIONNAIRES: GAD7 TOTAL SCORE: 9

## 2018-02-23 ASSESSMENT — PATIENT HEALTH QUESTIONNAIRE - PHQ9: SUM OF ALL RESPONSES TO PHQ QUESTIONS 1-9: 4

## 2018-03-08 ENCOUNTER — OFFICE VISIT (OUTPATIENT)
Dept: PSYCHOLOGY | Facility: CLINIC | Age: 53
End: 2018-03-08
Payer: COMMERCIAL

## 2018-03-08 DIAGNOSIS — F33.0 MAJOR DEPRESSIVE DISORDER, RECURRENT EPISODE, MILD (H): Primary | ICD-10-CM

## 2018-03-08 DIAGNOSIS — F41.1 GENERALIZED ANXIETY DISORDER: ICD-10-CM

## 2018-03-08 PROCEDURE — 90834 PSYTX W PT 45 MINUTES: CPT | Performed by: SOCIAL WORKER

## 2018-03-08 ASSESSMENT — ANXIETY QUESTIONNAIRES
2. NOT BEING ABLE TO STOP OR CONTROL WORRYING: MORE THAN HALF THE DAYS
7. FEELING AFRAID AS IF SOMETHING AWFUL MIGHT HAPPEN: SEVERAL DAYS
3. WORRYING TOO MUCH ABOUT DIFFERENT THINGS: MORE THAN HALF THE DAYS
1. FEELING NERVOUS, ANXIOUS, OR ON EDGE: SEVERAL DAYS
6. BECOMING EASILY ANNOYED OR IRRITABLE: SEVERAL DAYS
5. BEING SO RESTLESS THAT IT IS HARD TO SIT STILL: NOT AT ALL
GAD7 TOTAL SCORE: 8
IF YOU CHECKED OFF ANY PROBLEMS ON THIS QUESTIONNAIRE, HOW DIFFICULT HAVE THESE PROBLEMS MADE IT FOR YOU TO DO YOUR WORK, TAKE CARE OF THINGS AT HOME, OR GET ALONG WITH OTHER PEOPLE: SOMEWHAT DIFFICULT

## 2018-03-08 ASSESSMENT — PATIENT HEALTH QUESTIONNAIRE - PHQ9: 5. POOR APPETITE OR OVEREATING: SEVERAL DAYS
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
## 2018-03-08 NOTE — MR AVS SNAPSHOT
MRN:[id number]                      After Visit Summary   3/8/2018    Elizabeth Santizo    MRN: [id number]           Visit Information        Provider Department      3/8/2018 9:00 AM Mohit Garrett S [facility] Generic      Your next 10 appointments already scheduled     Mar 23, 2018  9:00 AM CDT   Return Visit with Garrett FRAGOSO [facility] ([facility])    [address] [id number] [phone]            Apr 11, 2018  9:00 AM CDT   [facility]    [address] [id number] [phone]              MyChart Information     BAC ON TRAChart gives you secure access to your electronic health record. If you see a primary care provider, you can also send messages to your care team and make appointments. If you have questions, please call your primary care clinic.  If you do not have a primary care provider, please call [phone] and they will assist you.        Care EveryWhere ID     This is your Care EveryWhere ID. This could be used by other organizations to access your [facility] medical records  [id number]        Equal Access to Services     JOSELO WHITFIELD : Hadii aad ku hadasho Socarmenali, waaxda luqadaha, qaybta kaalmada adeegyada, ledy jaime. So [facility] [phone].    ATENCIÓN: Si habla español, tiene a jimenez disposición servicios gratuitos de asistencia lingüística. Llame al [phone].    We comply with applicable federal civil rights laws and [place] laws. We do not discriminate on the basis of race, color, national origin, age, disability, sex, sexual orientation, or gender identity.

## 2018-03-09 ASSESSMENT — PATIENT HEALTH QUESTIONNAIRE - PHQ9: SUM OF ALL RESPONSES TO PHQ QUESTIONS 1-9: 8

## 2018-03-09 ASSESSMENT — ANXIETY QUESTIONNAIRES: GAD7 TOTAL SCORE: 8

## 2018-03-23 ENCOUNTER — OFFICE VISIT (OUTPATIENT)
Dept: PSYCHOLOGY | Facility: CLINIC | Age: 53
End: 2018-03-23
Payer: COMMERCIAL

## 2018-03-23 DIAGNOSIS — F33.0 MAJOR DEPRESSIVE DISORDER, RECURRENT EPISODE, MILD (H): Primary | ICD-10-CM

## 2018-03-23 DIAGNOSIS — F41.1 GENERALIZED ANXIETY DISORDER: ICD-10-CM

## 2018-03-23 PROCEDURE — 90834 PSYTX W PT 45 MINUTES: CPT | Performed by: SOCIAL WORKER

## 2018-03-23 ASSESSMENT — ANXIETY QUESTIONNAIRES
1. FEELING NERVOUS, ANXIOUS, OR ON EDGE: MORE THAN HALF THE DAYS
3. WORRYING TOO MUCH ABOUT DIFFERENT THINGS: MORE THAN HALF THE DAYS
IF YOU CHECKED OFF ANY PROBLEMS ON THIS QUESTIONNAIRE, HOW DIFFICULT HAVE THESE PROBLEMS MADE IT FOR YOU TO DO YOUR WORK, TAKE CARE OF THINGS AT HOME, OR GET ALONG WITH OTHER PEOPLE: SOMEWHAT DIFFICULT
6. BECOMING EASILY ANNOYED OR IRRITABLE: SEVERAL DAYS
2. NOT BEING ABLE TO STOP OR CONTROL WORRYING: SEVERAL DAYS
5. BEING SO RESTLESS THAT IT IS HARD TO SIT STILL: NOT AT ALL
7. FEELING AFRAID AS IF SOMETHING AWFUL MIGHT HAPPEN: SEVERAL DAYS
GAD7 TOTAL SCORE: 8

## 2018-03-23 ASSESSMENT — PATIENT HEALTH QUESTIONNAIRE - PHQ9: 5. POOR APPETITE OR OVEREATING: SEVERAL DAYS

## 2018-03-23 NOTE — MR AVS SNAPSHOT
MRN:[id number]                      After Visit Summary   3/23/2018    Elizabeth Santizo    MRN: [id number]           Visit Information        Provider Department      3/23/2018 9:00 AM Garrett Rueda, [facility] Generic      Your next 10 appointments already scheduled     Mar 28, 2018  9:00 AM CDT   Return Visit with Garrett Rueda [facility] ([facility])    [address] [address]   [phone]            Apr 11, 2018  9:00 AM CDT   Return Visit with Garrett Rueda [facility] ([facility])    [address] [address]   [phone]            Apr 20, 2018  2:00 PM CDT   Return Visit with Garrett Rueda [facility] ([facility])    [address] [address]   [phone]              MyChart Information     AddIn Socialt gives you secure access to your electronic health record. If you see a primary care provider, you can also send messages to your care team and make appointments. If you have questions, please call your primary care clinic.  If you do not have a primary care provider, please call [phone] and they will assist you.        Care EveryWhere ID     This is your Care EveryWhere ID. This could be used by other organizations to access your [facility] medical records  [id number]        Equal Access to Services     JOSELO WHITFIELD : Hadii tom pizano hadasho Socarmenali, waaxda luqadaha, qaybta kaalmada adeegyada, waxay sanjeev jaime. So [facility] [phone].    ATENCIÓN: Si habla español, tiene a jimenez disposición servicios gratuitos de asistencia lingüística. Llame al [phone].    We comply with applicable federal civil rights laws and [place] laws. We do not discriminate on the basis of race, color,  national origin, age, disability, sex, sexual orientation, or gender identity.

## 2018-03-23 NOTE — PROGRESS NOTES
01/11/18   Note: This is a copy of patient's last daily visit and will also serve as their Discharge Summary as they have not been in for further treatment 30 days past this date. Final assessment of goals and physical and functional status , therefore unavailable.   Providers   Providers VIANNEY Newell/L, CHT   Referring Physician Dr. Canas   General Information   Rxs Used 1   Medical Diagnosis Right wrist pain- Tenosynovitus   Orders Evaluate And Treat As Indicated   Start Of Care Date 01/11/18   Onset date of current episode/exacerbation 10/18/17   Subjective Measures   Subjective see eval   Initial Pain level 10/10  (at worst 3 at best)   Modalities   Modalities Ultrasound   Ultrasound -Type Pulsed 50%;5 cm sound head   Skilled Interventions To Enhance Tissue Repair;To Decrease Inflammation   Intensity 1.0w/cm2   Duration (does not include the 3-5 min set up/clean up time) 8 min   Frequency 3 MHz   Location first dorsal compartment   Therapeutic Exercise   Skilled Interventions (verbal and physical cuing)   Minutes of Treatment 5   Other Exer/Activities/Educ   Other Exer/Activities/Educ - All exercises are part of HEP unless otherwise noted Exercise 1;Exercise 2   Exercise 1 all planes- HEP instruction   Description 1 dequervains stretch   Exercise 2 pain free HEP instruction   Splinting   Splinting Forearm   ForeArm Splint Desciption Forearm based thumb spica splint   Wear Schedule Full time   Comments wear anc care instructions given   Skilled Intervention positiong   Minutes of Treatment 15   Self Care/Home Management   Comments 10 min to educate in diagnosis and activity modifcation relating to.   Hand Goals   Hand Goals Household Chores;Sports/Recreation   Household Chores   Current Functional Task Gripping   Previous Performance Level Independent   Current Performance Level Unable   Goal Target Task Open a tight or new jar   Goal Target Performance Level Mild difficulty  (less than 2/10 pain)    Due Date 02/26/18   Sports/Recreation   Current Functional Task Gripping   Previous Performance Level Independent   Curent Performance Level Unable   Goal Target Task Weight bear through hand; bar  (to lift weights)   Goal Target Performance Level Mild difficulty   Due Date 02/26/18   Education   Learner Patient   Readiness Eager   Method Booklet/handout;Explanation;Demonstration   Response Verbalizes understanding;Demonstrates understanding   Education Notes see home program   Plan   Home program full time splint wear, ex as above , activity modification   Plan To continue up to 2x/week utilizing modalities and ex as appropriate   Total Session Time   Madhavi Jones OTR/L CHT  Occupational Therapist, Certified Hand Therapist
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
## 2018-03-24 ASSESSMENT — ANXIETY QUESTIONNAIRES: GAD7 TOTAL SCORE: 8

## 2018-03-24 ASSESSMENT — PATIENT HEALTH QUESTIONNAIRE - PHQ9: SUM OF ALL RESPONSES TO PHQ QUESTIONS 1-9: 8

## 2018-04-04 ENCOUNTER — MYC MEDICAL ADVICE (OUTPATIENT)
Dept: ORTHOPEDICS | Facility: CLINIC | Age: 53
End: 2018-04-04

## 2018-04-04 DIAGNOSIS — S69.91XA INJURY OF RIGHT WRIST: ICD-10-CM

## 2018-04-04 DIAGNOSIS — M25.531 RIGHT WRIST PAIN: Primary | ICD-10-CM

## 2018-04-04 NOTE — TELEPHONE ENCOUNTER
Agree with note below.  Those are the options discussed back in December.  At this point would recommend follow up visit for repeat exam.    Phuong Canas MD

## 2018-04-11 ENCOUNTER — OFFICE VISIT (OUTPATIENT)
Dept: PSYCHOLOGY | Facility: CLINIC | Age: 53
End: 2018-04-11
Payer: COMMERCIAL

## 2018-04-11 DIAGNOSIS — F33.0 MAJOR DEPRESSIVE DISORDER, RECURRENT EPISODE, MILD (H): Primary | ICD-10-CM

## 2018-04-11 DIAGNOSIS — F41.1 GENERALIZED ANXIETY DISORDER: ICD-10-CM

## 2018-04-11 PROCEDURE — 90834 PSYTX W PT 45 MINUTES: CPT | Performed by: SOCIAL WORKER

## 2018-04-11 ASSESSMENT — ANXIETY QUESTIONNAIRES
6. BECOMING EASILY ANNOYED OR IRRITABLE: SEVERAL DAYS
1. FEELING NERVOUS, ANXIOUS, OR ON EDGE: MORE THAN HALF THE DAYS
2. NOT BEING ABLE TO STOP OR CONTROL WORRYING: MORE THAN HALF THE DAYS
5. BEING SO RESTLESS THAT IT IS HARD TO SIT STILL: NOT AT ALL
3. WORRYING TOO MUCH ABOUT DIFFERENT THINGS: MORE THAN HALF THE DAYS
7. FEELING AFRAID AS IF SOMETHING AWFUL MIGHT HAPPEN: SEVERAL DAYS
GAD7 TOTAL SCORE: 9
IF YOU CHECKED OFF ANY PROBLEMS ON THIS QUESTIONNAIRE, HOW DIFFICULT HAVE THESE PROBLEMS MADE IT FOR YOU TO DO YOUR WORK, TAKE CARE OF THINGS AT HOME, OR GET ALONG WITH OTHER PEOPLE: SOMEWHAT DIFFICULT

## 2018-04-11 ASSESSMENT — PATIENT HEALTH QUESTIONNAIRE - PHQ9: 5. POOR APPETITE OR OVEREATING: SEVERAL DAYS

## 2018-04-11 NOTE — MR AVS SNAPSHOT
MRN:[id number]                      After Visit Summary   4/11/2018    Elizabeth Santizo    MRN: [id number]           Visit Information        Provider Department      4/11/2018 9:00 AM Mohit Garrett S [facility] Generic      Your next 10 appointments already scheduled     Apr 26, 2018  9:00 AM CDT   Return Visit with Garrett FRAGOSO [facility] ([facility])    [address] [id number] [phone]            May 10, 2018  9:00 AM CDT   [facility]    [address] [id number] [phone]              MyChart Information     FanIQhart gives you secure access to your electronic health record. If you see a primary care provider, you can also send messages to your care team and make appointments. If you have questions, please call your primary care clinic.  If you do not have a primary care provider, please call [phone] and they will assist you.        Care EveryWhere ID     This is your Care EveryWhere ID. This could be used by other organizations to access your [facility] medical records  [id number]        Equal Access to Services     JOSELO WHITFIELD : Hadii aad ku hadasho Socarmenali, waaxda luqadaha, qaybta kaalmada adeegyada, ledy jaime. So [facility] [phone].    ATENCIÓN: Si habla español, tiene a jimenez disposición servicios gratuitos de asistencia lingüística. Llame al [phone].    We comply with applicable federal civil rights laws and [place] laws. We do not discriminate on the basis of race, color, national origin, age, disability, sex, sexual orientation, or gender identity.
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
## 2018-04-12 ASSESSMENT — ANXIETY QUESTIONNAIRES: GAD7 TOTAL SCORE: 9

## 2018-04-12 ASSESSMENT — PATIENT HEALTH QUESTIONNAIRE - PHQ9: SUM OF ALL RESPONSES TO PHQ QUESTIONS 1-9: 5

## 2018-04-18 ENCOUNTER — VIRTUAL VISIT (OUTPATIENT)
Dept: FAMILY MEDICINE | Facility: OTHER | Age: 53
End: 2018-04-18

## 2018-04-18 NOTE — PROGRESS NOTES
"Date:   Clinician: Sharif Randall  Clinician NPI: [id number]  Patient: Elizabeth Santizo  Patient : 1965  Patient Address: [address] [place], [place] [address]  Patient Phone: [phone]  Visit Protocol: URI  Patient Summary:  Elizabeth is a 52 year old ( : 1965 ) female who initiated a Visit for cold, sinus infection, or influenza. When asked the question \"Please sign me up to receive news, health information and promotions from [employer].\", Elizabeth responded \"No\".    Elizabeth states her symptoms started gradually 2-3 weeks ago.   Her symptoms consist of facial pain or pressure, a cough, a headache, rhinitis, nasal congestion, chills, malaise, myalgia, and tooth pain.   Symptom details     Nasal secretions: The color of her mucus is white and yellow.    Cough: Elizabeth coughs a few times an hour and her cough is more bothersome at night. Phlegm does not come into her throat when she coughs.     Facial pain or pressure: The facial pain or pressure feels worse when bending over or leaning forward.     Headache: She states the headache is moderate (between 4-6 on a 10 point pain scale).     Tooth pain: The tooth pain is not caused by a cavity, recent dental work, or other mouth problems.      Elizabeth denies having fever, dyspnea, ear pain, sore throat, and wheezing. She also denies having recent facial or sinus surgery in the past 60 days, taking antibiotic medication for the symptoms, and double sickening (worsening symptoms after initial improvement).   She has not recently been exposed to someone with influenza. Elizabeth has not been in close contact with any high risk individuals.   Elizabeth had 1 sinus infection within the past year.   Weight: 132 lbs   Elizabeth does not smoke or use smokeless tobacco.   She denies pregnancy and denies breastfeeding. She does not menstruate.  MEDICATIONS:      Lipitor oral    Metoprolol tartrate oral    , ALLERGIES:     amoxicillin    Clinician Response:  Kate Johnson,  Based on the " information provided, you have acute bacterial sinusitis, also known as a sinus infection. Sinus infections are caused by bacteria or a virus and symptoms are almost always identical. The difference between the 2 types of infections is timing.  Sinus infections start as viral infections and symptoms improve on their own in about 7 days. If symptoms have not improved after 7 days or have even worsened, a bacterial infection may have developed.  Medication information  I am prescribing:       Fluticasone 50 mcg/actuation nasal spray. Inhale 1-2 sprays in each nostril 1 time per day. This medication takes several days to start working, so keep taking it even if it doesn't help right away. There are no refills with this prescription.      Doxycycline hyclate 100 mg oral tablet. Take 1 tablet by mouth 2 times per day for 7 days. There are no refills with this prescription.     Antibiotics can cause an allergic reaction even if you have taken them without a problem in the past. If you develop a new rash, swelling, or difficulty breathing, stop the medication and be seen in a clinic or urgent care immediately.  A yeast infection is a side effect of taking antibiotics in some women. Please use [facility] to get treatment if you have symptoms of a yeast infection.  If you become pregnant during this course of treatment, stop taking the medication and contact your primary care provider.  Unless you are allergic to the over-the-counter medication(s) below, I recommend using:     Saline nasal spray ([place] or store brand). Use 1-2 sprays in each nostril 3 times a day as needed for congestion.   Over-the-counter medications do not require a prescription. Ask the pharmacist if you have any questions.  Self care  The following tips will keep you as comfortable as possible while you recover:     Rest    Drink plenty of water and other liquids    Take a hot shower to loosen congestion    Take a spoonful of honey to reduce your cough      When to seek care  Please be seen in a clinic or urgent care if any of the following occur:     Symptoms do not start to improve after 3 days of treatment    New symptoms develop, or symptoms become worse      Diagnosis: Acute bacterial sinusitis  Diagnosis ICD: J01.90  Prescription: fluticasone 50 mcg/actuation nasal spray,suspension 1 120 spray aerosol with adapter (grams), 30 days supply. Inhale 1-2 sprays in each nostril 1 time per day. Refills: 0, Refill as needed: no, Allow substitutions: yes  Prescription: doxycycline hyclate 100 mg oral tablet 14 tablet, 7 days supply. Take 1 tablet by mouth 2 times per day for 7 days. Refills: 0, Refill as needed: no, Allow substitutions: yes  Pharmacy: [id number] - [phone] - [address] [address]

## 2018-05-16 PROBLEM — F33.0 MILD EPISODE OF RECURRENT MAJOR DEPRESSIVE DISORDER (H): Status: ACTIVE | Noted: 2018-05-16

## 2018-06-15 ENCOUNTER — TELEPHONE (OUTPATIENT)
Dept: FAMILY MEDICINE | Facility: CLINIC | Age: 53
End: 2018-06-15

## 2018-06-15 NOTE — LETTER
July 5, 2018      Elizabeth Santizo  [address] [facility] [address]        Dear Elizabeth,     We have been unable to reach you by phone.     We spoke with you a few weeks ago to update the depression questionnaire.  The results were sent to and reviewed by ABRAN Mares.  Her recommendations were to follow up with her in the clinic if you felt you needed to.       If you have any questions or concerns please contact the clinic either by phone or mychart.     Thank you for trusting us with your health care.     Sincerely,        Your [facility] [place] [facility]/lw

## 2018-06-18 ASSESSMENT — ANXIETY QUESTIONNAIRES
1. FEELING NERVOUS, ANXIOUS, OR ON EDGE: MORE THAN HALF THE DAYS
3. WORRYING TOO MUCH ABOUT DIFFERENT THINGS: SEVERAL DAYS
IF YOU CHECKED OFF ANY PROBLEMS ON THIS QUESTIONNAIRE, HOW DIFFICULT HAVE THESE PROBLEMS MADE IT FOR YOU TO DO YOUR WORK, TAKE CARE OF THINGS AT HOME, OR GET ALONG WITH OTHER PEOPLE: SOMEWHAT DIFFICULT
GAD7 TOTAL SCORE: 8
7. FEELING AFRAID AS IF SOMETHING AWFUL MIGHT HAPPEN: SEVERAL DAYS
6. BECOMING EASILY ANNOYED OR IRRITABLE: SEVERAL DAYS
2. NOT BEING ABLE TO STOP OR CONTROL WORRYING: MORE THAN HALF THE DAYS
5. BEING SO RESTLESS THAT IT IS HARD TO SIT STILL: NOT AT ALL

## 2018-06-18 ASSESSMENT — PATIENT HEALTH QUESTIONNAIRE - PHQ9: 5. POOR APPETITE OR OVEREATING: SEVERAL DAYS

## 2018-06-18 NOTE — TELEPHONE ENCOUNTER
"  Panel Management Review      Patient has the following on her problem list:     Depression / Dysthymia review    Measure:  Needs PHQ-9 score of 4 or less during index window.  Administer PHQ-9 and if score is 5 or more, send encounter to provider for next steps.        PHQ-9 SCORE 3/23/2018 4/11/2018 6/18/2018   Total Score 8 5 7       If PHQ-9 recheck is 5 or more, route to provider for next steps.    Patient is due for:  PHQ9 and DAP      Composite cancer screening  Chart review shows that this patient is due/due soon for the following None  Summary:    Patient is due/failing the following:   PHQ9    Action needed:   Patient needs to do PHQ9.    Type of outreach:    Phone, spoke to patient.  phq-9/gad7 updated. Patient states she no longer takes cymbalta and has not for 1+ yrs. She is currently not taking any anti depressant. She stated all the ones she tried in the past had \"horrible \" side effects. She has not seen the Therapist x 1 months due to cost.     Questions for provider review:    See results of phq-9/gad7 and comments above. Please advise and will call patient back wit any further recommendations. Thank you.            PHQ-9 ([employer]) 6/18/2018   1.  Little interest or pleasure in doing things 1   2.  Feeling down, depressed, or hopeless 1   3.  Trouble falling or staying asleep, or sleeping too much 3   4.  Feeling tired or having little energy 0   5.  Poor appetite or overeating 1   6.  Feeling bad about yourself 1   7.  Trouble concentrating 0   8.  Moving slowly or restless 0   9.  Suicidal or self-harm thoughts 0   PHQ-9 Total Score 7   Difficulty at work, home, or with people Somewhat difficult                                                                                  PATRICIA-7   [employer], 2002; Used with Permission) 6/18/2018   1. Feeling nervous, anxious, or on edge 2   2. Not being able to stop or control worrying 2   3. Worrying too much about different things 1   4. Trouble relaxing 1 "   5. Being so restless that it is hard to sit still 0   6. Becoming easily annoyed or irritable 1   7. Feeling afraid, as if something awful might happen 1   PATRICIA-7 Total Score 8   If you checked any problems, how difficult have they made it for you to do your work, take care of things at home, or get along with other people? Somewhat difficult                                                 Del CHEATHAM [facility]       Chart routed to Provider .

## 2018-06-19 ASSESSMENT — ANXIETY QUESTIONNAIRES: GAD7 TOTAL SCORE: 8

## 2018-06-19 ASSESSMENT — PATIENT HEALTH QUESTIONNAIRE - PHQ9: SUM OF ALL RESPONSES TO PHQ QUESTIONS 1-9: 7

## 2018-06-24 ENCOUNTER — VIRTUAL VISIT (OUTPATIENT)
Dept: FAMILY MEDICINE | Facility: OTHER | Age: 53
End: 2018-06-24

## 2018-06-24 NOTE — PROGRESS NOTES
"Date:   Clinician: Pamela Eden  Clinician NPI: [id number]  Patient: Elizabeth Santizo  Patient : 1965  Patient Address: [address] [place] [address]  Patient Phone: [phone]  Visit Protocol: URI  Patient Summary:  Elizabeth is a 53 year old ( : 1965 ) female who initiated a Visit for cold, sinus infection, or influenza. When asked the question \"Please sign me up to receive news, health information and promotions from [facility].\", Elizabeth responded \"No\".    Elizabeth states her symptoms started gradually 10-13 days ago.   Her symptoms consist of rhinitis, tooth pain, myalgia, facial pain or pressure, malaise, nasal congestion, and a headache. Elizabeth also feels feverish but was unable to measure her temperature.   Symptom details     Nasal secretions: The color of her mucus is yellow.    Facial pain or pressure: The facial pain or pressure feels worse when bending over or leaning forward.     Headache: She states the headache is moderate (between 4-6 on a 10 point pain scale).     Tooth pain: The tooth pain is not caused by a cavity, recent dental work, or other mouth problems.      Elizabeth denies having enlarged lymph nodes, wheezing, dyspnea, ear pain, sore throat, chills, and cough. She also denies having recent facial or sinus surgery in the past 60 days, taking antibiotic medication for the symptoms, and double sickening (worsening symptoms after initial improvement).   She has not recently been exposed to someone with influenza. Elizabeth has not been in close contact with any high risk individuals.   Elizabeth had 1 sinus infection within the past year.   Weight: 130 lbs   Elizabeth does not smoke or use smokeless tobacco.   She denies pregnancy and denies breastfeeding. She does not menstruate.  MEDICATIONS: [{\"id\"=&gt;7462907, \"description\"=&gt;\"Lipitor oral\"}, {\"id\"=&gt;3050852, \"description\"=&gt;\"metoprolol tartrate oral\"}], ALLERGIES: [{\"id\"=&gt;1878641, \"description\"=&gt;\"amoxicillin\"}, " "{\"id\"=&gt;1359293, \"description\"=&gt;\"amoxicillin\"}]  Clinician Response:  Dear Elizabeth,  Based on the information provided, you have acute bacterial sinusitis, also known as a sinus infection. Sinus infections are caused by bacteria or a virus and symptoms are almost always identical. The difference between the 2 types of infections is timing.  Sinus infections start as viral infections and symptoms improve on their own in about 7 days. If symptoms have not improved after 7 days or have even worsened, a bacterial infection may have developed.  Medication information  I am prescribing:     Sulfamethoxazole-trimethoprim (Bactrim DS) 800-160 mg oral tablet. Take 1 tablet by mouth every 12 hours for 14 days. There are no refills with this prescription.   Antibiotics can cause an allergic reaction even if you have taken them without a problem in the past. If you develop a new rash, swelling, or difficulty breathing, stop the medication and be seen in a clinic or urgent care immediately.  A yeast infection is a side effect of taking antibiotics in some women. Please use [facility] to get treatment if you have symptoms of a yeast infection.  If you become pregnant during this course of treatment, stop taking the medication and contact your primary care provider.  Unless you are allergic to the over-the-counter medication(s) below, I recommend using:     An antihistamine such as Benadryl, Claritin, or store brand.    A decongestant such as Sudafed PE or store brand.     Over-the-counter medications do not require a prescription. Ask the pharmacist if you have any questions.  Self care  The following tips will keep you as comfortable as possible while you recover:     Rest    Drink plenty of water and other liquids    Take a hot shower to loosen congestion     When to seek care  Please be seen in a clinic or urgent care if any of the following occur:     Symptoms do not start to improve after 3 days of treatment    New symptoms " develop, or symptoms become worse      Diagnosis: Acute bacterial sinusitis  Diagnosis ICD: J01.90  Additional Clinician Notes: Your symptoms are consistent with a sinus infection. Take warm steamy showers; get rest and drink plenty of water. You can try otc decongestants for a few days as needed and alternate between Tylenol and ibuprofen.   Prescription: sulfamethoxazole-trimethoprim (Bactrim DS) 800-160 mg oral tablet 28 tablet, 14 days supply. Take 1 tablet by mouth every 12 hours for 14 days. Refills: 0, Refill as needed: no, Allow substitutions: yes  Pharmacy: [id number] - [phone] - [address] [address]

## 2018-07-03 NOTE — TELEPHONE ENCOUNTER
No response to phone calls, will try sending a [employer] message with provider recommendations.  Will postpone to see if patient views message.

## 2018-10-02 ENCOUNTER — TRANSFERRED RECORDS (OUTPATIENT)
Dept: HEALTH INFORMATION MANAGEMENT | Facility: CLINIC | Age: 53
End: 2018-10-02

## 2018-10-05 ENCOUNTER — TELEPHONE (OUTPATIENT)
Dept: FAMILY MEDICINE | Facility: CLINIC | Age: 53
End: 2018-10-05

## 2018-10-08 ENCOUNTER — OFFICE VISIT (OUTPATIENT)
Dept: FAMILY MEDICINE | Facility: CLINIC | Age: 53
End: 2018-10-08
Payer: COMMERCIAL

## 2018-10-08 VITALS
DIASTOLIC BLOOD PRESSURE: 86 MMHG | TEMPERATURE: 97.4 F | BODY MASS INDEX: 26.06 KG/M2 | WEIGHT: 138 LBS | HEART RATE: 76 BPM | SYSTOLIC BLOOD PRESSURE: 128 MMHG | HEIGHT: 61 IN | RESPIRATION RATE: 12 BRPM | OXYGEN SATURATION: 98 %

## 2018-10-08 DIAGNOSIS — M65.4 DE QUERVAIN'S TENOSYNOVITIS, RIGHT: ICD-10-CM

## 2018-10-08 DIAGNOSIS — Z12.11 SPECIAL SCREENING FOR MALIGNANT NEOPLASMS, COLON: ICD-10-CM

## 2018-10-08 DIAGNOSIS — I10 ESSENTIAL HYPERTENSION WITH GOAL BLOOD PRESSURE LESS THAN 140/90: ICD-10-CM

## 2018-10-08 DIAGNOSIS — Z01.818 PREOP GENERAL PHYSICAL EXAM: Primary | ICD-10-CM

## 2018-10-08 DIAGNOSIS — Z23 NEED FOR PROPHYLACTIC VACCINATION AND INOCULATION AGAINST INFLUENZA: ICD-10-CM

## 2018-10-08 DIAGNOSIS — E78.5 HYPERLIPIDEMIA WITH TARGET LDL LESS THAN 130: ICD-10-CM

## 2018-10-08 PROCEDURE — 90686 IIV4 VACC NO PRSV 0.5 ML IM: CPT | Performed by: NURSE PRACTITIONER

## 2018-10-08 PROCEDURE — 99215 OFFICE O/P EST HI 40 MIN: CPT | Mod: 25 | Performed by: NURSE PRACTITIONER

## 2018-10-08 PROCEDURE — 90471 IMMUNIZATION ADMIN: CPT | Performed by: NURSE PRACTITIONER

## 2018-10-08 PROCEDURE — 93000 ELECTROCARDIOGRAM COMPLETE: CPT | Performed by: NURSE PRACTITIONER

## 2018-10-08 RX ORDER — ATORVASTATIN CALCIUM 40 MG/1
40 TABLET, FILM COATED ORAL DAILY
Qty: 90 TABLET | Refills: 0 | Status: SHIPPED | OUTPATIENT
Start: 2018-10-08 | End: 2018-11-23

## 2018-10-08 RX ORDER — METOPROLOL SUCCINATE 50 MG/1
50 TABLET, EXTENDED RELEASE ORAL DAILY
Qty: 90 TABLET | Refills: 0 | Status: SHIPPED | OUTPATIENT
Start: 2018-10-08 | End: 2018-11-23

## 2018-10-08 ASSESSMENT — PAIN SCALES - GENERAL: PAINLEVEL: MODERATE PAIN (4)

## 2018-10-08 NOTE — PROGRESS NOTES

## 2018-10-08 NOTE — NURSING NOTE
"Initial /86 (BP Location: Right arm, Patient Position: Chair, Cuff Size: Adult Regular)  Pulse 76  Temp 97.4  F (36.3  C) (Tympanic)  Resp 12  Ht 5' 1\" (1.549 m)  Wt 138 lb (62.6 kg)  SpO2 98%  BMI 26.07 kg/m2 Estimated body mass index is 26.07 kg/(m^2) as calculated from the following:    Height as of this encounter: 5' 1\" (1.549 m).    Weight as of this encounter: 138 lb (62.6 kg). .    Ramonita Munoz CMA    "

## 2018-10-08 NOTE — MR AVS SNAPSHOT
After Visit Summary   10/8/2018    Elizabeth Santizo    MRN: [id number]           Patient Information     Date Of Birth          1965        Visit Information        Provider Department      10/8/2018 7:00 AM Ilana Mares APRN CNP [facility]        Today's Diagnoses     Preop general physical exam    -  1    De Quervain's tenosynovitis, right        Hyperlipidemia with target LDL less than 130        Essential hypertension with goal blood pressure less than 140/90        Special screening for malignant neoplasms, colon          Care Instructions      Before Your Surgery      Call your surgeon if there is any change in your health. This includes signs of a cold or flu (such as a sore throat, runny nose, cough, rash or fever).    Do not smoke, drink alcohol or take over the counter medicine (unless your surgeon or primary care doctor tells you to) for the 24 hours before and after surgery.    If you take prescribed drugs: Follow your doctor s orders about which medicines to take and which to stop until after surgery.    Eating and drinking prior to surgery: follow the instructions from your surgeon    Take a shower or bath the night before surgery. Use the soap your surgeon gave you to gently clean your skin. If you do not have soap from your surgeon, use your regular soap. Do not shave or scrub the surgery site.  Wear clean pajamas and have clean sheets on your bed.           Follow-ups after your visit        Follow-up notes from your care team     Return in about 3 months (around 1/8/2019) for Physical Exam.      Future tests that were ordered for you today     Open Future Orders        Priority Expected Expires Ordered    Fecal colorectal cancer screen (FIT) Routine 10/29/2018 10/7/2019 10/8/2018            Who to contact     If you have questions or need follow up information about today's clinic visit or your schedule please contact [facility]  "directly at [phone].  Normal or non-critical lab and imaging results will be communicated to you by [employer], letter or phone within 4 business days after the clinic has received the results. If you do not hear from us within 7 days, please contact the clinic through [employer] or phone. If you have a critical or abnormal lab result, we will notify you by phone as soon as possible.  Submit refill requests through [employer] or call your pharmacy and they will forward the refill request to us. Please allow 3 business days for your refill to be completed.          Additional Information About Your Visit        [employer] Information     [employer] gives you secure access to your electronic health record. If you see a primary care provider, you can also send messages to your care team and make appointments. If you have questions, please call your primary care clinic.  If you do not have a primary care provider, please call [phone] and they will assist you.        Care EveryWhere ID     This is your Care EveryWhere ID. This could be used by other organizations to access your [facility] medical records  [id number]        Your Vitals Were     Pulse Temperature Respirations Height Pulse Oximetry BMI (Body Mass Index)    76 97.4  F (36.3  C) (Tympanic) 12 5' 1\" (1.549 m) 98% 26.07 kg/m2       Blood Pressure from Last 3 Encounters:   10/08/18 128/86   01/11/18 128/76   12/06/17 (!) 139/91    Weight from Last 3 Encounters:   10/08/18 138 lb (62.6 kg)   01/11/18 138 lb 4 oz (62.7 kg)   12/06/17 137 lb 8 oz (62.4 kg)              We Performed the Following     EKG 12-lead complete w/read - Clinics          Today's Medication Changes          These changes are accurate as of 10/8/18  7:26 AM.  If you have any questions, ask your nurse or doctor.               Stop taking these medicines if you haven't already. Please contact your care team if you have questions.     DULoxetine 20 MG EC capsule   Commonly known as:  CYMBALTA "   Stopped by:  Ilana Mares APRN CNP           sulfamethoxazole-trimethoprim 800-160 MG per tablet   Commonly known as:  BACTRIM DS/SEPTRA DS   Stopped by:  Ilana Mares APRN CNP                Where to get your medicines      These medications were sent to [id number] - [place], [place] - [address]  [address], [facility] [address]     Phone:  [phone]     atorvastatin 40 MG tablet    metoprolol succinate 50 MG 24 hr tablet                Primary Care Provider Office Phone # Fax #    SOLANGE Oliver [phone] [address] [facility] [address]        Equal Access to Services     JOSELO WHITFIELD : Hadii aad ku hadasho Soomaali, waaxda luqadaha, qaybta kaalmada adeegyada, waxcarola shipman . So [facility] [phone].    ATENCIÓN: Si habla español, tiene a jimenez disposición servicios gratuitos de asistencia lingüística. [facility] [phone].    We comply with applicable federal civil rights laws and [place] laws. We do not discriminate on the basis of race, color, national origin, age, disability, sex, sexual orientation, or gender identity.            Thank you!     Thank you for choosing [facility]  for your care. Our goal is always to provide you with excellent care. Hearing back from our patients is one way we can continue to improve our services. Please take a few minutes to complete the written survey that you may receive in the mail after your visit with us. Thank you!             Your Updated Medication List - Protect others around you: Learn how to safely use, store and throw away your medicines at www.disposemymeds.org.          This list is accurate as of 10/8/18  7:26 AM.  Always use your most recent med list.                   Brand Name Dispense Instructions for use Diagnosis    atorvastatin 40 MG tablet    LIPITOR    90 tablet    Take 1 tablet (40 mg) by mouth daily     Hyperlipidemia with target LDL less than 130       B-12 100 MCG Tabs           fluticasone 50 MCG/ACT spray    FLONASE    1 Bottle    Spray 1 spray into both nostrils daily    Chronic seasonal allergic rhinitis, unspecified trigger       metoprolol succinate 50 MG 24 hr tablet    TOPROL-XL    90 tablet    Take 1 tablet (50 mg) by mouth daily    Essential hypertension with goal blood pressure less than 140/90       OMEGA 3 PO      Take 2,400 mg by mouth daily        order for DME     1 Device    Equipment being ordered: thumb splint universal    Injury of right wrist, initial encounter       traZODone 50 MG tablet    DESYREL    30 tablet    Take 0.5-1 tablets (25-50 mg) by mouth nightly as needed for sleep    Major depressive disorder, recurrent episode, moderate (H), PATRICIA (generalized anxiety disorder)       VITAMIN C PO      Take 500 mg by mouth daily

## 2018-10-08 NOTE — PROGRESS NOTES
[facility]  [address] [facility] [address]  [phone]  Dept: [phone]    PRE-OP EVALUATION:  Today's date: 10/8/2018    Elizabeth Santizo (: 1965) presents for pre-operative evaluation assessment as requested by Dr. Rose.  She requires evaluation and anesthesia risk assessment prior to undergoing surgery/procedure for treatment of Right Hand release surgery.    Proposed Surgery/ Procedure: Right Hand release surgery  Date of Surgery/ Procedure: 10/15/18  Time of Surgery/ Procedure: [facility]/Surgical Facility: [facility]  Fax number for surgical facility: [phone]  Primary Physician: Ilana Mares  Type of Anesthesia Anticipated: Local, per patient    Patient has a Health Care Directive or Living Will:  NO    1. NO - Do you have a history of heart attack, stroke, stent, bypass or surgery on an artery in the head, neck, heart or legs?  2. NO - Do you ever have any pain or discomfort in your chest?  3. NO - Do you have a history of  Heart Failure?  4. NO - Are you troubled by shortness of breath when: walking on the level, up a slight hill or at night?  5. NO - Do you currently have a cold, bronchitis or other respiratory infection?  6. NO - Do you have a cough, shortness of breath or wheezing?  7. NO - Do you sometimes get pains in the calves of your legs when you walk?  8. NO - DO YOU OR ANYONE IN YOUR FAMILY HAVE PREVIOUS HISTORY OF BLOOD CLOTS?   9. NO - Do you or does anyone in your family have a serious bleeding problem such as prolonged bleeding following surgeries or cuts?  10. NO - Have you ever had problems with anemia or been told to take iron pills?  11. NO - Have you had any abnormal blood loss such as black, tarry or bloody stools, or abnormal vaginal bleeding?  12. NO - Have you ever had a blood transfusion?  13. NO - Have you or any of your relatives ever had problems with anesthesia?  14. NO - Do you have  sleep apnea, excessive snoring or daytime drowsiness?  15. NO - Do you have any prosthetic heart valves?  16. NO - Do you have prosthetic joints?  17. NO - Is there any chance that you may be pregnant?      HPI:     HPI related to upcoming procedure:   Right wrist pain for one year - diagnosed with Dr Sun's  Treated with OT, splinting, cortisone shot      HYPERLIPIDEMIA - Patient has a long history of significant Hyperlipidemia requiring medication for treatment with recent good control. Patient reports no problems or side effects with the medication.     HYPERTENSION - Patient has longstanding history of HTN , currently denies any symptoms referable to elevated blood pressure. Specifically denies chest pain, palpitations, dyspnea, orthopnea, PND or peripheral edema. Blood pressure readings have been in normal range. Current medication regimen is as listed below. Patient denies any side effects of medication.        MEDICAL HISTORY:     Patient Active Problem List    Diagnosis Date Noted     Mild episode of recurrent major depressive disorder (H) 05/16/2018     Priority: Medium     Impaired fasting glucose 09/26/2016     Priority: Medium     Anxiety 06/30/2016     Priority: Medium     Hypertension goal BP (blood pressure) < 140/90 10/31/2013     Priority: Medium     Hyperlipidemia with target LDL less than 130 10/31/2013     Priority: Medium     Diagnosis updated by automated process. Provider to review and confirm.        Past Medical History:   Diagnosis Date     Generalized headaches      Hyperlipidemia      Hypertension      Impaired fasting glucose      Overweight(278.02)      Past Surgical History:   Procedure Laterality Date     TUBAL LIGATION  1995     Current Outpatient Prescriptions   Medication Sig Dispense Refill     Ascorbic Acid (VITAMIN C PO) Take 500 mg by mouth daily        atorvastatin (LIPITOR) 40 MG tablet Take 1 tablet (40 mg) by mouth daily 90 tablet 0     Cyanocobalamin (B-12) 100 MCG  "TABS        metoprolol succinate (TOPROL-XL) 50 MG 24 hr tablet Take 1 tablet (50 mg) by mouth daily 90 tablet 0     Omega-3 Fatty Acids (OMEGA 3 PO) Take 2,400 mg by mouth daily       order for DME Equipment being ordered: thumb splint universal 1 Device 0     fluticasone (FLONASE) 50 MCG/ACT spray Spray 1 spray into both nostrils daily 1 Bottle 11     traZODone (DESYREL) 50 MG tablet Take 0.5-1 tablets (25-50 mg) by mouth nightly as needed for sleep 30 tablet 1     [DISCONTINUED] atorvastatin (LIPITOR) 40 MG tablet Take 1 tablet (40 mg) by mouth daily (Patient not taking: Reported on 10/8/2018) 90 tablet 3     [DISCONTINUED] metoprolol (TOPROL-XL) 50 MG 24 hr tablet Take 1 tablet (50 mg) by mouth daily (Patient not taking: Reported on 10/8/2018) 90 tablet 3     OTC products: None, except as noted above    Allergies   Allergen Reactions     Amoxicillin Rash      Latex Allergy: NO    Social History   Substance Use Topics     Smoking status: Former Smoker     Packs/day: 0.30     Years: 10.00     Quit date: 2/24/2010     Smokeless tobacco: Never Used     Alcohol use 2.0 oz/week      Comment: socially     History   Drug Use No       REVIEW OF SYSTEMS:   Constitutional, neuro, ENT, endocrine, pulmonary, cardiac, gastrointestinal, genitourinary, musculoskeletal, integument and psychiatric systems are negative, except as otherwise noted.    EXAM:   /86 (BP Location: Right arm, Patient Position: Chair, Cuff Size: Adult Regular)  Pulse 76  Temp 97.4  F (36.3  C) (Tympanic)  Resp 12  Ht 5' 1\" (1.549 m)  Wt 138 lb (62.6 kg)  SpO2 98%  BMI 26.07 kg/m2    GENERAL APPEARANCE: healthy, alert and no distress     EYES: EOMI, PERRL     HENT: ear canals and TM's normal and nose and mouth without ulcers or lesions     NECK: no adenopathy, no asymmetry, masses, or scars and thyroid normal to palpation     RESP: lungs clear to auscultation - no rales, rhonchi or wheezes     CV: regular rates and rhythm, normal S1 S2, no S3 " or S4 and no murmur, click or rub     ABDOMEN:  soft, nontender, no HSM or masses and bowel sounds normal     MS: no edema     SKIN: no suspicious lesions or rashes     NEURO: Normal strength and tone, sensory exam grossly normal, mentation intact and speech normal     PSYCH: mentation appears normal. and affect normal/bright     LYMPHATICS: No cervical adenopathy    DIAGNOSTICS:   EKG: appears normal, NSR, normal axis, normal intervals, no acute ST/T changes c/w ischemia, no LVH by voltage criteria, there are no prior tracings available    Recent Labs   Lab Test  08/29/17   0703  03/23/17   1805  03/20/17   1705   07/11/14   0723   HGB   --   12.7  14.1   < >   --    PLT   --   289  274   < >   --    NA  142  139  138   < >   --    POTASSIUM  4.2  3.2*  3.2*   < >   --    CR  0.63  0.56  0.59   < >   --    A1C  5.4   --    --    --   5.7    < > = values in this interval not displayed.      IMPRESSION:   Reason for surgery/procedure: De Quervain's tenosynovitis, right  Diagnosis/reason for consult: pre-op evaluation    The proposed surgical procedure is considered INTERMEDIATE risk.    REVISED CARDIAC RISK INDEX  The patient has the following serious cardiovascular risks for perioperative complications such as (MI, PE, VFib and 3  AV Block):  No serious cardiac risks  INTERPRETATION: 0 risks: Class I (very low risk - 0.4% complication rate)    The patient has the following additional risks for perioperative complications:  HTN, hyperlipidemia      ICD-10-CM    1. Preop general physical exam Z01.818    2. De Quervain's tenosynovitis, right M65.4    3. Hyperlipidemia with target LDL less than 130 E78.5 atorvastatin (LIPITOR) 40 MG tablet   4. Essential hypertension with goal blood pressure less than 140/90 I10 metoprolol succinate (TOPROL-XL) 50 MG 24 hr tablet   5. Special screening for malignant neoplasms, colon Z12.11 Fecal colorectal cancer screen (FIT)       RECOMMENDATIONS:       Cardiovascular Risk  Performs 4  METs exercise without symptoms (Light housework (dusting, washing dishes), Climb a flight of stairs, Walk on level ground at 15 minutes per mile (4 miles/hour) and Bicycling at 8.5 minutes per mile (7 miles/hour)) .   Patient is already on a Beta Blocker. Continue Betablocker therapy after surgery, using Beta blocker order set as necessary for NPO status.      --Patient is to take all scheduled medications on the day of surgery EXCEPT for modifications listed below.    Anticoagulant or Antiplatelet Medication Use  NSAIDS: stop for one week prior to surgery        APPROVAL GIVEN to proceed with proposed procedure, without further diagnostic evaluation       Signed Electronically by: SOLANGE Huerta CNP    Copy of this evaluation report is provided to requesting physician.    [facility] Preop Guidelines    Revised Cardiac Risk Index

## 2018-10-23 ENCOUNTER — TRANSFERRED RECORDS (OUTPATIENT)
Dept: HEALTH INFORMATION MANAGEMENT | Facility: CLINIC | Age: 53
End: 2018-10-23

## 2018-11-15 ENCOUNTER — VIRTUAL VISIT (OUTPATIENT)
Dept: FAMILY MEDICINE | Facility: OTHER | Age: 53
End: 2018-11-15

## 2018-11-15 NOTE — PROGRESS NOTES
"Date:   Clinician: Ivan Torres  Clinician NPI: [id number]  Patient: Elizabeth Santizo  Patient : 1965  Patient Address: [address] [place] [address]  Patient Phone: [phone]  Visit Protocol: URI  Patient Summary:  Elizabeth is a 53 year old ( : 1965 ) female who initiated a Visit for cold, sinus infection, or influenza. When asked the question \"Please sign me up to receive news, health information and promotions from [employer].\", Elizabeth responded \"No\".    Elizabeth states her symptoms started gradually 2-3 weeks ago.   Her symptoms consist of a headache, chills, malaise, facial pain or pressure, myalgia, tooth pain, and nasal congestion. Elizabeth also feels feverish but was unable to measure her temperature.   Symptom details     Nasal secretions: The color of her mucus is yellow.    Facial pain or pressure: The facial pain or pressure feels worse when bending over or leaning forward.     Headache: She states the headache is severe (7-9 on a 10 point pain scale).     Tooth pain: The tooth pain is not caused by a cavity, recent dental work, or other mouth problems.      Elizabeth denies having enlarged lymph nodes, sore throat, ear pain, dyspnea, cough, wheezing, and rhinitis. She also denies double sickening (worsening symptoms after initial improvement), taking antibiotic medication for the symptoms, and having recent facial or sinus surgery in the past 60 days.   She has not recently been exposed to someone with influenza. Elizabeth has not been in close contact with any high risk individuals.   Elizabeth had 2 sinus infections within the past year.   Weight: 128 lbs   Elizabeth does not smoke or use smokeless tobacco.   MEDICATIONS: Lipitor oral, metoprolol tartrate oral, ALLERGIES: amoxicillin  Clinician Response:  Dear Elizabeth,  Based on the information provided, you have acute bacterial sinusitis, also known as a sinus infection. Sinus infections are caused by bacteria or a virus and symptoms are almost " always identical. The difference between the 2 types of infections is timing.  Sinus infections start as viral infections and symptoms improve on their own in about 7 days. If symptoms have not improved after 7 days or have even worsened, a bacterial infection may have developed.  Medication information  I am prescribing:     Azithromycin (Zithromax Z-Yamil) 250 mg oral tablet. Take 2 tablets by mouth on day 1, then 1 tablet by mouth on days 2-5. There are no refills with this prescription.   Antibiotics can cause an allergic reaction even if you have taken them without a problem in the past. If you develop a new rash, swelling, or difficulty breathing, stop the medication and be seen in a clinic or urgent care immediately.  A yeast infection is a side effect of taking antibiotics in some women. Please use [facility] to get treatment if you have symptoms of a yeast infection.  Self care  The following tips will keep you as comfortable as possible while you recover:     Rest    Drink plenty of water and other liquids    Take a hot shower to loosen congestion     When to seek care  Please be seen in a clinic or urgent care if any of the following occur:     Symptoms do not start to improve after 3 days of treatment    New symptoms develop, or symptoms become worse      Diagnosis: Acute bacterial sinusitis  Diagnosis ICD: J01.90  Prescription: azithromycin (Zithromax Z-Yamil) 250 mg oral tablet 6 tablet, 5 days supply. Take 2 tablets by mouth on day 1, then 1 tablet by mouth on days 2-5. Refills: 0, Refill as needed: no, Allow substitutions: yes  Pharmacy: [id number] - [phone] - [address] [id number]

## 2018-11-19 PROCEDURE — 82274 ASSAY TEST FOR BLOOD FECAL: CPT | Performed by: NURSE PRACTITIONER

## 2018-11-21 ASSESSMENT — ENCOUNTER SYMPTOMS
PALPITATIONS: 0
CHILLS: 0
SHORTNESS OF BREATH: 0
PARESTHESIAS: 0
DYSURIA: 0
HEARTBURN: 0
HEMATURIA: 0
BREAST MASS: 0
FEVER: 0
DIARRHEA: 0
WEAKNESS: 0
JOINT SWELLING: 0
EYE PAIN: 0
NAUSEA: 0
CONSTIPATION: 0
HEMATOCHEZIA: 0
MYALGIAS: 1
DIZZINESS: 0
FREQUENCY: 0
COUGH: 0
NERVOUS/ANXIOUS: 0
SORE THROAT: 0
ARTHRALGIAS: 0
HEADACHES: 1
ABDOMINAL PAIN: 0

## 2018-11-23 ENCOUNTER — HOSPITAL ENCOUNTER (OUTPATIENT)
Dept: MAMMOGRAPHY | Facility: CLINIC | Age: 53
Discharge: HOME | End: 2018-11-23
Attending: NURSE PRACTITIONER | Admitting: NURSE PRACTITIONER
Payer: COMMERCIAL

## 2018-11-23 ENCOUNTER — OFFICE VISIT (OUTPATIENT)
Dept: FAMILY MEDICINE | Facility: CLINIC | Age: 53
End: 2018-11-23
Payer: COMMERCIAL

## 2018-11-23 VITALS
DIASTOLIC BLOOD PRESSURE: 80 MMHG | BODY MASS INDEX: 26.11 KG/M2 | SYSTOLIC BLOOD PRESSURE: 122 MMHG | OXYGEN SATURATION: 96 % | HEART RATE: 97 BPM | WEIGHT: 133 LBS | HEIGHT: 60 IN | TEMPERATURE: 98.1 F

## 2018-11-23 DIAGNOSIS — Z00.00 ROUTINE GENERAL MEDICAL EXAMINATION AT A HEALTH CARE FACILITY: Primary | ICD-10-CM

## 2018-11-23 DIAGNOSIS — Z12.31 VISIT FOR SCREENING MAMMOGRAM: ICD-10-CM

## 2018-11-23 DIAGNOSIS — E78.5 HYPERLIPIDEMIA WITH TARGET LDL LESS THAN 130: ICD-10-CM

## 2018-11-23 DIAGNOSIS — F41.1 GAD (GENERALIZED ANXIETY DISORDER): ICD-10-CM

## 2018-11-23 DIAGNOSIS — I10 ESSENTIAL HYPERTENSION WITH GOAL BLOOD PRESSURE LESS THAN 140/90: ICD-10-CM

## 2018-11-23 DIAGNOSIS — F33.1 MAJOR DEPRESSIVE DISORDER, RECURRENT EPISODE, MODERATE (H): ICD-10-CM

## 2018-11-23 LAB
ANION GAP SERPL CALCULATED.3IONS-SCNC: 5 MMOL/L (ref 3–14)
BUN SERPL-MCNC: 11 MG/DL (ref 7–30)
CALCIUM SERPL-MCNC: 9 MG/DL (ref 8.5–10.1)
CHLORIDE SERPL-SCNC: 106 MMOL/L (ref 94–109)
CHOLEST SERPL-MCNC: 203 MG/DL
CO2 SERPL-SCNC: 29 MMOL/L (ref 20–32)
CREAT SERPL-MCNC: 0.53 MG/DL (ref 0.52–1.04)
GFR SERPL CREATININE-BSD FRML MDRD: >90 ML/MIN/1.7M2
GLUCOSE SERPL-MCNC: 104 MG/DL (ref 70–99)
HDLC SERPL-MCNC: 50 MG/DL
LDLC SERPL CALC-MCNC: 119 MG/DL
NONHDLC SERPL-MCNC: 153 MG/DL
POTASSIUM SERPL-SCNC: 4 MMOL/L (ref 3.4–5.3)
SODIUM SERPL-SCNC: 140 MMOL/L (ref 133–144)
TRIGL SERPL-MCNC: 171 MG/DL

## 2018-11-23 PROCEDURE — 36415 COLL VENOUS BLD VENIPUNCTURE: CPT | Performed by: NURSE PRACTITIONER

## 2018-11-23 PROCEDURE — 77067 SCR MAMMO BI INCL CAD: CPT

## 2018-11-23 PROCEDURE — 80061 LIPID PANEL: CPT | Performed by: NURSE PRACTITIONER

## 2018-11-23 PROCEDURE — 99396 PREV VISIT EST AGE 40-64: CPT | Performed by: NURSE PRACTITIONER

## 2018-11-23 PROCEDURE — 80048 BASIC METABOLIC PNL TOTAL CA: CPT | Performed by: NURSE PRACTITIONER

## 2018-11-23 RX ORDER — METOPROLOL SUCCINATE 50 MG/1
50 TABLET, EXTENDED RELEASE ORAL DAILY
Qty: 90 TABLET | Refills: 3 | Status: SHIPPED | OUTPATIENT
Start: 2018-11-23 | End: 2019-12-16

## 2018-11-23 RX ORDER — TRAZODONE HYDROCHLORIDE 50 MG/1
25-50 TABLET, FILM COATED ORAL
Qty: 30 TABLET | Refills: 11 | Status: SHIPPED | OUTPATIENT
Start: 2018-11-23 | End: 2020-03-06

## 2018-11-23 RX ORDER — ATORVASTATIN CALCIUM 40 MG/1
40 TABLET, FILM COATED ORAL DAILY
Qty: 90 TABLET | Refills: 3 | Status: SHIPPED | OUTPATIENT
Start: 2018-11-23 | End: 2019-12-16

## 2018-11-23 ASSESSMENT — ENCOUNTER SYMPTOMS
SHORTNESS OF BREATH: 0
PALPITATIONS: 0
ABDOMINAL PAIN: 0
HEMATURIA: 0
NAUSEA: 0
HEADACHES: 1
ARTHRALGIAS: 0
FEVER: 0
WEAKNESS: 0
DIZZINESS: 0
SORE THROAT: 0
BREAST MASS: 0
HEMATOCHEZIA: 0
NERVOUS/ANXIOUS: 0
CONSTIPATION: 0
DIARRHEA: 0
FREQUENCY: 0
PARESTHESIAS: 0
CHILLS: 0
HEARTBURN: 0
JOINT SWELLING: 0
EYE PAIN: 0
DYSURIA: 0
MYALGIAS: 1
COUGH: 0

## 2018-11-23 ASSESSMENT — PATIENT HEALTH QUESTIONNAIRE - PHQ9: SUM OF ALL RESPONSES TO PHQ QUESTIONS 1-9: 3

## 2018-11-23 NOTE — LETTER
November 23, 2018      Elizabeth Santizo  [address] [facility]  [place] [address]        Dear ,    We are writing to inform you of your test results.      Kidney function and electrolytes are normal.   Blood sugar is borderline.     Triglycerides are better this year.   LDL (bad cholesterol) went up a bit.     Continue to work on diet and exercise   Recheck in one year.        Resulted Orders   Lipid panel reflex to direct LDL Fasting   Result Value Ref Range    Cholesterol 203 (H) <200 mg/dL      Comment:      Desirable:       <200 mg/dl    Triglycerides 171 (H) <150 mg/dL      Comment:      Borderline high:  150-199 mg/dl  High:             200-499 mg/dl  Very high:       >499 mg/dl  Fasting specimen      HDL Cholesterol 50 >49 mg/dL    LDL Cholesterol Calculated 119 (H) <100 mg/dL      Comment:      Above desirable:  100-129 mg/dl  Borderline High:  130-159 mg/dL  High:             160-189 mg/dL  Very high:       >189 mg/dl      Non HDL Cholesterol 153 (H) <130 mg/dL      Comment:      Above Desirable:  130-159 mg/dl  Borderline high:  160-189 mg/dl  High:             190-219 mg/dl  Very high:       >219 mg/dl     Basic metabolic panel   Result Value Ref Range    Sodium 140 133 - 144 mmol/L    Potassium 4.0 3.4 - 5.3 mmol/L    Chloride 106 94 - 109 mmol/L    Carbon Dioxide 29 20 - 32 mmol/L    Anion Gap 5 3 - 14 mmol/L    Glucose 104 (H) 70 - 99 mg/dL      Comment:      Fasting specimen    Urea Nitrogen 11 7 - 30 mg/dL    Creatinine 0.53 0.52 - 1.04 mg/dL    GFR Estimate >90 >60 mL/min/1.7m2      Comment:      Non  GFR Calc    GFR Estimate If Black >90 >60 mL/min/1.7m2      Comment:       GFR Calc    Calcium 9.0 8.5 - 10.1 mg/dL       If you have any questions or concerns, please call the clinic at the number listed above.       Sincerely,        SOLANGE Huerta CNP

## 2018-11-23 NOTE — MR AVS SNAPSHOT
After Visit Summary   11/23/2018    Elizabeth Santizo    MRN: [id number]           Patient Information     Date Of Birth          1965        Visit Information        Provider Department      11/23/2018 8:20 AM Ilana Mares APRN [facility]        Today's Diagnoses     Routine general medical examination at a health care facility    -  1    Hyperlipidemia with target LDL less than 130        Essential hypertension with goal blood pressure less than 140/90        Major depressive disorder, recurrent episode, moderate (H)        PATRICIA (generalized anxiety disorder)          Care Instructions      Preventive Health Recommendations  Female Ages 50 - 64    Yearly exam: See your health care provider every year in order to  o Review health changes.   o Discuss preventive care.    o Review your medicines if your doctor has prescribed any.      Get a Pap test every three years (unless you have an abnormal result and your provider advises testing more often).    If you get Pap tests with HPV test, you only need to test every 5 years, unless you have an abnormal result.     You do not need a Pap test if your uterus was removed (hysterectomy) and you have not had cancer.    You should be tested each year for STDs (sexually transmitted diseases) if you're at risk.     Have a mammogram every 1 to 2 years.    Have a colonoscopy at age 50, or have a yearly FIT test (stool test). These exams screen for colon cancer.      Have a cholesterol test every 5 years, or more often if advised.    Have a diabetes test (fasting glucose) every three years. If you are at risk for diabetes, you should have this test more often.     If you are at risk for osteoporosis (brittle bone disease), think about having a bone density scan (DEXA).    Shots: Get a flu shot each year. Get a tetanus shot every 10 years.    Nutrition:     Eat at least 5 servings of fruits and vegetables each day.    Eat  whole-grain bread, whole-wheat pasta and brown rice instead of white grains and rice.    Get adequate Calcium and Vitamin D.     Lifestyle    Exercise at least 150 minutes a week (30 minutes a day, 5 days a week). This will help you control your weight and prevent disease.    Limit alcohol to one drink per day.    No smoking.     Wear sunscreen to prevent skin cancer.     See your dentist every six months for an exam and cleaning.    See your eye doctor every 1 to 2 years.            Follow-ups after your visit        Follow-up notes from your care team     Return in about 1 year (around 11/23/2019) for Physical Exam.      Future tests that were ordered for you today     Open Future Orders        Priority Expected Expires Ordered    MA Screen Bilateral w/Fritz Routine  10/22/2019 10/22/2018            Who to contact     If you have questions or need follow up information about today's clinic visit or your schedule please contact [facility] directly at [phone].  Normal or non-critical lab and imaging results will be communicated to you by Tapestryhart, letter or phone within 4 business days after the clinic has received the results. If you do not hear from us within 7 days, please contact the clinic through Curoverset or phone. If you have a critical or abnormal lab result, we will notify you by phone as soon as possible.  Submit refill requests through Skyrider or call your pharmacy and they will forward the refill request to us. Please allow 3 business days for your refill to be completed.          Additional Information About Your Visit        TapestryharLightning Gaming Information     Skyrider gives you secure access to your electronic health record. If you see a primary care provider, you can also send messages to your care team and make appointments. If you have questions, please call your primary care clinic.  If you do not have a primary care provider, please call [phone] and they will assist you.        Care  "EveryWhere ID     This is your Care EveryWhere ID. This could be used by other organizations to access your [place] medical records  [id number]        Your Vitals Were     Pulse Temperature Height Last Period Pulse Oximetry BMI (Body Mass Index)    97 98.1  F (36.7  C) (Tympanic) 5' 0.25\" (1.53 m) 09/23/2018 96% 25.76 kg/m2       Blood Pressure from Last 3 Encounters:   11/23/18 122/80   10/08/18 128/86   01/11/18 128/76    Weight from Last 3 Encounters:   11/23/18 133 lb (60.3 kg)   10/08/18 138 lb (62.6 kg)   01/11/18 138 lb 4 oz (62.7 kg)              We Performed the Following     Basic metabolic panel     Lipid panel reflex to direct LDL Fasting          Where to get your medicines      These medications were sent to [facility] [address] [place] [address] [address]  [address] [facility] [address]     Phone:  [phone]     atorvastatin 40 MG tablet    metoprolol succinate 50 MG 24 hr tablet    traZODone 50 MG tablet          Primary Care Provider Office Phone # Fax #    Ilana Hendrix Sintia Mares, APRN [phone] [address] [facility] [address]        Equal Access to Services     JOSELO WHITFIELD AH: Hadii tom ku hadasho Soomaali, waaxda luqadaha, qaybta kaalmada adeegyada, waxay anibalin haytracyn valentina shipman . So [facility] [phone].    ATENCIÓN: Si habla español, tiene a jimenez disposición servicios gratuitos de asistencia lingüística. Llame al [phone].    We comply with applicable federal civil rights laws and [place] laws. We do not discriminate on the basis of race, color, national origin, age, disability, sex, sexual orientation, or gender identity.            Thank you!     Thank you for choosing [facility]  for your care. Our goal is always to provide you with excellent care. Hearing back from our patients is one way we can continue to improve our services. Please take a few minutes to complete the written survey that you may receive in " the mail after your visit with us. Thank you!             Your Updated Medication List - Protect others around you: Learn how to safely use, store and throw away your medicines at www.disposemymeds.org.          This list is accurate as of 11/23/18  8:30 AM.  Always use your most recent med list.                   Brand Name Dispense Instructions for use Diagnosis    atorvastatin 40 MG tablet    LIPITOR    90 tablet    Take 1 tablet (40 mg) by mouth daily    Hyperlipidemia with target LDL less than 130       B-12 100 MCG Tabs           fluticasone 50 MCG/ACT spray    FLONASE    1 Bottle    Spray 1 spray into both nostrils daily    Chronic seasonal allergic rhinitis, unspecified trigger       metoprolol succinate 50 MG 24 hr tablet    TOPROL-XL    90 tablet    Take 1 tablet (50 mg) by mouth daily    Essential hypertension with goal blood pressure less than 140/90       OMEGA 3 PO      Take 2,400 mg by mouth daily        traZODone 50 MG tablet    DESYREL    30 tablet    Take 0.5-1 tablets (25-50 mg) by mouth nightly as needed for sleep    Major depressive disorder, recurrent episode, moderate (H), PATRICIA (generalized anxiety disorder)       VITAMIN C PO      Take 500 mg by mouth daily

## 2018-11-23 NOTE — LETTER
[facility]  [address] [facility] [address]  [phone]          November 23, 2018    RE:  Elizabeth Santizo                                                                                                                                                       [address] [facility] [address]            To whom it may concern:    Elizabeth Santizo was seen in my clinic today. She is getting headaches from the lighting at work.         Sincerely,          Ilana Mares, CNP

## 2018-11-23 NOTE — LETTER
My Depression Action Plan  Name: Elizabeth Santizo   Date of Birth 1965  Date: 11/23/2018    My doctor: Ilana Mares   My clinic: [facility]  [address] [facility] [address]  [phone]          GREEN    ZONE   Good Control    What it looks like:     Things are going generally well. You have normal up s and down s. You may even feel depressed from time to time, but bad moods usually last less than a day.   What you need to do:  1. Continue to care for yourself (see self care plan)  2. Check your depression survival kit and update it as needed  3. Follow your physician s recommendations including any medication.  4. Do not stop taking medication unless you consult with your physician first.           YELLOW         ZONE Getting Worse    What it looks like:     Depression is starting to interfere with your life.     It may be hard to get out of bed; you may be starting to isolate yourself from others.    Symptoms of depression are starting to last most all day and this has happened for several days.     You may have suicidal thoughts but they are not constant.   What you need to do:     1. Call your care team, your response to treatment will improve if you keep your care team informed of your progress. Yellow periods are signs an adjustment may need to be made.     2. Continue your self-care, even if you have to fake it!    3. Talk to someone in your support network    4. Open up your depression survival kit           RED    ZONE Medical Alert - Get Help    What it looks like:     Depression is seriously interfering with your life.     You may experience these or other symptoms: You can t get out of bed most days, can t work or engage in other necessary activities, you have trouble taking care of basic hygiene, or basic responsibilities, thoughts of suicide or death that will not go away, self-injurious behavior.     What you need to do:  1. Call your  care team and request a same-day appointment. If they are not available (weekends or after hours) call your local crisis line, emergency room or 911.            Depression Self Care Plan / Survival Kit    Self-Care for Depression  Here s the deal. Your body and mind are really not as separate as most people think.  What you do and think affects how you feel and how you feel influences what you do and think. This means if you do things that people who feel good do, it will help you feel better.  Sometimes this is all it takes.  There is also a place for medication and therapy depending on how severe your depression is, so be sure to consult with your medical provider and/ or Behavioral Health Consultant if your symptoms are worsening or not improving.     In order to better manage my stress, I will:    Exercise  Get some form of exercise, every day. This will help reduce pain and release endorphins, the  feel good  chemicals in your brain. This is almost as good as taking antidepressants!  This is not the same as joining a gym and then never going! (they count on that by the way ) It can be as simple as just going for a walk or doing some gardening, anything that will get you moving.      Hygiene   Maintain good hygiene (Get out of bed in the morning, Make your bed, Brush your teeth, Take a shower, and Get dressed like you were going to work, even if you are unemployed).  If your clothes don't fit try to get ones that do.    Diet  I will strive to eat foods that are good for me, drink plenty of water, and avoid excessive sugar, caffeine, alcohol, and other mood-altering substances.  Some foods that are helpful in depression are: complex carbohydrates, B vitamins, flaxseed, fish or fish oil, fresh fruits and vegetables.    Psychotherapy  I agree to participate in Individual Therapy (if recommended).    Medication  If prescribed medications, I agree to take them.  Missing doses can result in serious side effects.  I  understand that drinking alcohol, or other illicit drug use, may cause potential side effects.  I will not stop my medication abruptly without first discussing it with my provider.    Staying Connected With Others  I will stay in touch with my friends, family members, and my primary care provider/team.    Use your imagination  Be creative.  We all have a creative side; it doesn t matter if it s oil painting, sand castles, or mud pies! This will also kick up the endorphins.    Witness Beauty  (AKA stop and smell the roses) Take a look outside, even in mid-winter. Notice colors, textures. Watch the squirrels and birds.     Service to others  Be of service to others.  There is always someone else in need.  By helping others we can  get out of ourselves  and remember the really important things.  This also provides opportunities for practicing all the other parts of the program.    Humor  Laugh and be silly!  Adjust your TV habits for less news and crime-drama and more comedy.    Control your stress  Try breathing deep, massage therapy, biofeedback, and meditation. Find time to relax each day.     My support system    Clinic Contact:  Phone number:    Contact 1:  Phone number:    Contact 2:  Phone number:    Anabaptism/:  Phone number:    Therapist:  Phone number:    Local crisis center:    Phone number:    Other community support:  Phone number:

## 2018-11-23 NOTE — PROGRESS NOTES
"   SUBJECTIVE:   CC: Elizabeth Santizo is an 53 year old woman who presents for preventive health visit.     Physical   Annual:     Getting at least 3 servings of Calcium per day:  NO    Bi-annual eye exam:  Yes    Dental care twice a year:  Yes    Sleep apnea or symptoms of sleep apnea:  None    Diet:  Carbohydrate counting    Frequency of exercise:  None    Taking medications regularly:  Yes    Medication side effects:  Muscle aches, Significant flushing and Lightheadedness    Additional concerns today:  Yes        Trouble sleeping   Asking for refill of trazodone      Headaches \"are back\"  Not migraine type  Daily headaches for the last couple months  advil and tylenol-  Eases pain, doesn't go away  Just moved into new office with a bright light above her head        Today's PHQ-2 Score:   PHQ-2 ( 1999 [employer]) 11/21/2018   Q1: Little interest or pleasure in doing things 0   Q2: Feeling down, depressed or hopeless 0   PHQ-2 Score 0   Q1: Little interest or pleasure in doing things Not at all   Q2: Feeling down, depressed or hopeless Not at all   PHQ-2 Score 0       Abuse: Current or Past(Physical, Sexual or Emotional)- No  Do you feel safe in your environment - Yes    Social History   Substance Use Topics     Smoking status: Former Smoker     Packs/day: 0.30     Years: 9.00     Types: Cigarettes     Start date: 11/1/2001     Quit date: 2/15/2010     Smokeless tobacco: Never Used     Alcohol use 2.0 oz/week      Comment: socially     Alcohol Use 11/21/2018   If you drink alcohol do you typically have greater than 3 drinks per day OR greater than 7 drinks per week? No       Reviewed orders with patient.  Reviewed health maintenance and updated orders accordingly - Yes        Pertinent mammograms are reviewed under the imaging tab.    History of abnormal Pap smear: YES - updated in Problem List and Health Maintenance accordingly. Many years ago  PAP / HPV Latest Ref Rng & Units 7/21/2017   PAP - NIL   HPV 16 DNA " NEG Negative   HPV 18 DNA NEG Negative   OTHER HR HPV NEG Negative     Reviewed and updated as needed this visit by clinical staff  Tobacco  Allergies         Reviewed and updated as needed this visit by Provider            Review of Systems   Constitutional: Negative for chills and fever.   HENT: Negative for congestion, ear pain, hearing loss and sore throat.    Eyes: Negative for pain and visual disturbance.   Respiratory: Negative for cough and shortness of breath.    Cardiovascular: Negative for chest pain, palpitations and peripheral edema.   Gastrointestinal: Negative for abdominal pain, constipation, diarrhea, heartburn, hematochezia and nausea.   Breasts:  Positive for tenderness. Negative for breast mass and discharge.   Genitourinary: Negative for dysuria, frequency, genital sores, hematuria, pelvic pain, urgency, vaginal bleeding and vaginal discharge.   Musculoskeletal: Positive for myalgias. Negative for arthralgias and joint swelling.   Skin: Negative for rash.   Neurological: Positive for headaches. Negative for dizziness, weakness and paresthesias.   Psychiatric/Behavioral: Negative for mood changes. The patient is not nervous/anxious.      CONSTITUTIONAL: NEGATIVE for fever, chills, change in weight  INTEGUMENTARY/SKIN: NEGATIVE for worrisome rashes, moles or lesions  EYES: NEGATIVE for vision changes or irritation  ENT: NEGATIVE for ear, mouth and throat problems  RESP: NEGATIVE for significant cough or SOB  BREAST: NEGATIVE for masses, tenderness or discharge  CV: NEGATIVE for chest pain, palpitations or peripheral edema  GI: NEGATIVE for nausea, abdominal pain, heartburn, or change in bowel habits  : NEGATIVE for unusual urinary or vaginal symptoms. No vaginal bleeding.  MUSCULOSKELETAL: NEGATIVE for significant arthralgias or myalgia  NEURO: NEGATIVE for weakness, dizziness or paresthesias  PSYCHIATRIC: NEGATIVE for changes in mood or affect      OBJECTIVE:   /80 (BP Location: Right  "arm)  Pulse 97  Temp 98.1  F (36.7  C) (Tympanic)  Ht 5' 0.25\" (1.53 m)  Wt 133 lb (60.3 kg)  LMP 09/23/2018  SpO2 96%  BMI 25.76 kg/m2  Physical Exam  GENERAL APPEARANCE: healthy, alert and no distress  EYES: Eyes grossly normal to inspection, PERRL and conjunctivae and sclerae normal  HENT: ear canals and TM's normal, nose and mouth without ulcers or lesions, oropharynx clear and oral mucous membranes moist  NECK: no adenopathy, no asymmetry, masses, or scars and thyroid normal to palpation  RESP: lungs clear to auscultation - no rales, rhonchi or wheezes  BREAST: normal without masses, tenderness or nipple discharge and no palpable axillary masses or adenopathy  CV: regular rate and rhythm, normal S1 S2, no S3 or S4, no murmur, click or rub, no peripheral edema and peripheral pulses strong  ABDOMEN: soft, nontender, no hepatosplenomegaly, no masses and bowel sounds normal   (female): normal female external genitalia and normal urethral meatus  MS: no musculoskeletal defects are noted and gait is age appropriate without ataxia  SKIN: no suspicious lesions or rashes  NEURO: Normal strength and tone, sensory exam grossly normal, mentation intact and speech normal  PSYCH: mentation appears normal and affect normal/bright      ASSESSMENT/PLAN:       ICD-10-CM    1. Routine general medical examination at a health care facility Z00.00    2. Hyperlipidemia with target LDL less than 130 E78.5 atorvastatin (LIPITOR) 40 MG tablet     Lipid panel reflex to direct LDL Fasting   3. Essential hypertension with goal blood pressure less than 140/90 I10 metoprolol succinate (TOPROL-XL) 50 MG 24 hr tablet     Basic metabolic panel   4. Major depressive disorder, recurrent episode, moderate (H) F33.1 traZODone (DESYREL) 50 MG tablet   5. PATRICIA (generalized anxiety disorder) F41.1 traZODone (DESYREL) 50 MG tablet       COUNSELING:  Reviewed preventive health counseling, as reflected in patient instructions    BP Readings from " "Last 1 Encounters:   11/23/18 122/80     Estimated body mass index is 25.76 kg/(m^2) as calculated from the following:    Height as of this encounter: 5' 0.25\" (1.53 m).    Weight as of this encounter: 133 lb (60.3 kg).           reports that she quit smoking about 8 years ago. Her smoking use included Cigarettes. She started smoking about 17 years ago. She has a 2.70 pack-year smoking history. She has never used smokeless tobacco.        The risks, benefits and treatment options of prescribed medications or other treatments have been discussed with the patient. The patient verbalized their understanding and should call or follow up if no improvement or if they develop further problems.    SOLANGE Huerta [facility]  "

## 2018-11-24 LAB — HEMOCCULT STL QL IA: NEGATIVE

## 2018-11-26 DIAGNOSIS — Z12.11 SPECIAL SCREENING FOR MALIGNANT NEOPLASMS, COLON: ICD-10-CM

## 2018-11-27 ENCOUNTER — TRANSFERRED RECORDS (OUTPATIENT)
Dept: HEALTH INFORMATION MANAGEMENT | Facility: CLINIC | Age: 53
End: 2018-11-27

## 2019-01-17 ENCOUNTER — OFFICE VISIT (OUTPATIENT)
Dept: FAMILY MEDICINE | Facility: CLINIC | Age: 54
End: 2019-01-17
Payer: COMMERCIAL

## 2019-01-17 VITALS
HEIGHT: 60 IN | RESPIRATION RATE: 14 BRPM | BODY MASS INDEX: 26.23 KG/M2 | DIASTOLIC BLOOD PRESSURE: 84 MMHG | HEART RATE: 74 BPM | SYSTOLIC BLOOD PRESSURE: 116 MMHG | OXYGEN SATURATION: 98 % | TEMPERATURE: 97.1 F | WEIGHT: 133.6 LBS

## 2019-01-17 DIAGNOSIS — N30.00 ACUTE CYSTITIS WITHOUT HEMATURIA: Primary | ICD-10-CM

## 2019-01-17 LAB
ALBUMIN UR-MCNC: NEGATIVE MG/DL
APPEARANCE UR: ABNORMAL
BACTERIA #/AREA URNS HPF: ABNORMAL /HPF
BILIRUB UR QL STRIP: NEGATIVE
COLOR UR AUTO: YELLOW
GLUCOSE UR STRIP-MCNC: NEGATIVE MG/DL
HGB UR QL STRIP: ABNORMAL
KETONES UR STRIP-MCNC: NEGATIVE MG/DL
LEUKOCYTE ESTERASE UR QL STRIP: ABNORMAL
NITRATE UR QL: POSITIVE
NON-SQ EPI CELLS #/AREA URNS LPF: ABNORMAL /LPF
PH UR STRIP: 5.5 PH (ref 5–7)
RBC #/AREA URNS AUTO: ABNORMAL /HPF
SOURCE: ABNORMAL
SP GR UR STRIP: <=1.005 (ref 1–1.03)
UROBILINOGEN UR STRIP-ACNC: 0.2 EU/DL (ref 0.2–1)
WBC #/AREA URNS AUTO: ABNORMAL /HPF

## 2019-01-17 PROCEDURE — 87088 URINE BACTERIA CULTURE: CPT | Performed by: FAMILY MEDICINE

## 2019-01-17 PROCEDURE — 87186 SC STD MICRODIL/AGAR DIL: CPT | Performed by: FAMILY MEDICINE

## 2019-01-17 PROCEDURE — 99213 OFFICE O/P EST LOW 20 MIN: CPT | Performed by: FAMILY MEDICINE

## 2019-01-17 PROCEDURE — 81001 URINALYSIS AUTO W/SCOPE: CPT | Performed by: FAMILY MEDICINE

## 2019-01-17 PROCEDURE — 87086 URINE CULTURE/COLONY COUNT: CPT | Performed by: FAMILY MEDICINE

## 2019-01-17 RX ORDER — CIPROFLOXACIN 500 MG/1
500 TABLET, FILM COATED ORAL 2 TIMES DAILY
Qty: 14 TABLET | Refills: 0 | Status: SHIPPED | OUTPATIENT
Start: 2019-01-17 | End: 2019-01-24

## 2019-01-17 ASSESSMENT — MIFFLIN-ST. JEOR: SCORE: 1137.51

## 2019-01-17 NOTE — PATIENT INSTRUCTIONS
(N30.00) Acute cystitis without hematuria  (primary encounter diagnosis)  Comment:   Plan: ciprofloxacin (CIPRO) 500 MG tablet        Start Cipro at 500 mg twice daily for 7 days. We will notify the results. Consider Pyridium at 200 mg three times daily for bladder pain, and this will turn the urine red.   Stay well hydrated, and monitor for fever and back pain and nausea. Prevention discussed.

## 2019-01-17 NOTE — PROGRESS NOTES
SUBJECTIVE:   Elizabeth Santizo is a 53 year old female who presents to clinic today for the following health issues:  Chief Complaint   Patient presents with     UTI     Pt here for uti symptoms.       URINARY TRACT SYMPTOMS  Onset: yesterday    Description:   Painful urination (Dysuria): YES  Blood in urine (Hematuria): no   Delay in urine (Hesitency): no     Intensity: severe this morning, moderate now    Progression of Symptoms:  worsening and constant    Accompanying Signs & Symptoms:  Fever/chills: YES- chills  Flank pain no   Nausea and vomiting: no   Any vaginal symptoms: none  Abdominal/Pelvic Pain: no     History:   History of frequent UTI's: no   History of kidney stones: no   Sexually Active: YES  Possibility of pregnancy: No    Precipitating factors:   none    Therapies Tried and outcome: Increase fluid intake      Current Outpatient Medications:      Ascorbic Acid (VITAMIN C PO), Take 500 mg by mouth daily , Disp: , Rfl:      atorvastatin (LIPITOR) 40 MG tablet, Take 1 tablet (40 mg) by mouth daily, Disp: 90 tablet, Rfl: 3     Cyanocobalamin (B-12) 100 MCG TABS, , Disp: , Rfl:      metoprolol succinate (TOPROL-XL) 50 MG 24 hr tablet, Take 1 tablet (50 mg) by mouth daily, Disp: 90 tablet, Rfl: 3     Omega-3 Fatty Acids (OMEGA 3 PO), Take 2,400 mg by mouth daily, Disp: , Rfl:      traZODone (DESYREL) 50 MG tablet, Take 0.5-1 tablets (25-50 mg) by mouth nightly as needed for sleep, Disp: 30 tablet, Rfl: 11     fluticasone (FLONASE) 50 MCG/ACT spray, Spray 1 spray into both nostrils daily (Patient not taking: Reported on 1/17/2019), Disp: 1 Bottle, Rfl: 11    Patient Active Problem List   Diagnosis     Hypertension goal BP (blood pressure) < 140/90     Hyperlipidemia with target LDL less than 130     Anxiety     Impaired fasting glucose     Mild episode of recurrent major depressive disorder (H)       Blood pressure 116/84, pulse 74, temperature 97.1  F (36.2  C), resp. rate 14, height 1.524 m (5'),  weight 60.6 kg (133 lb 9.6 oz), SpO2 98 %, not currently breastfeeding.    Exam:  GENERAL APPEARANCE: healthy, alert and no distress  ABDOMEN:  soft, nontender, no HSM or masses and bowel sounds normal; the CVA and bladder areas are not tender.   PSYCH: mentation appears normal and affect normal/bright    UA: abnormal with signs of the UTI; UC is pending.       (N30.00) Acute cystitis without hematuria  (primary encounter diagnosis)  Comment:   Plan: ciprofloxacin (CIPRO) 500 MG tablet        Start Cipro at 500 mg twice daily for 7 days. We will notify the results. Consider Pyridium at 200 mg three times daily for bladder pain, and this will turn the urine red.   Stay well hydrated, and monitor for fever and back pain and nausea. Prevention discussed.     Khoi Nicole

## 2019-01-18 LAB
BACTERIA SPEC CULT: ABNORMAL
Lab: ABNORMAL
SPECIMEN SOURCE: ABNORMAL

## 2019-03-25 ENCOUNTER — MYC MEDICAL ADVICE (OUTPATIENT)
Dept: FAMILY MEDICINE | Facility: CLINIC | Age: 54
End: 2019-03-25

## 2019-05-14 ENCOUNTER — OFFICE VISIT (OUTPATIENT)
Dept: FAMILY MEDICINE | Facility: CLINIC | Age: 54
End: 2019-05-14
Payer: COMMERCIAL

## 2019-05-14 VITALS
RESPIRATION RATE: 14 BRPM | SYSTOLIC BLOOD PRESSURE: 134 MMHG | TEMPERATURE: 98.2 F | OXYGEN SATURATION: 96 % | BODY MASS INDEX: 25.94 KG/M2 | DIASTOLIC BLOOD PRESSURE: 82 MMHG | WEIGHT: 132.8 LBS | HEART RATE: 67 BPM

## 2019-05-14 DIAGNOSIS — R07.0 THROAT PAIN: Primary | ICD-10-CM

## 2019-05-14 DIAGNOSIS — J02.9 VIRAL PHARYNGITIS: ICD-10-CM

## 2019-05-14 LAB
DEPRECATED S PYO AG THROAT QL EIA: NORMAL
SPECIMEN SOURCE: NORMAL

## 2019-05-14 PROCEDURE — 87081 CULTURE SCREEN ONLY: CPT | Performed by: NURSE PRACTITIONER

## 2019-05-14 PROCEDURE — 99213 OFFICE O/P EST LOW 20 MIN: CPT | Performed by: NURSE PRACTITIONER

## 2019-05-14 PROCEDURE — 87880 STREP A ASSAY W/OPTIC: CPT | Performed by: NURSE PRACTITIONER

## 2019-05-14 NOTE — PATIENT INSTRUCTIONS
Patient Education     Viral Pharyngitis (Sore Throat)    You or your child have pharyngitis (sore throat). This infection is caused by a virus. It can cause throat pain that is worse when swallowing, aching all over, headache, and fever. The infection may be spread by coughing, kissing, or touching others after touching your mouth or nose. Antibiotic medicines do not work against viruses. They are not used for treating this illness.  Home care    If symptoms are severe, you or your child should rest at home. Return to work or school when you or your child feel well enough.     You or your child should drink plenty of fluids to prevent dehydration.    Use throat lozenges or numbing throat sprays to help reduce pain. Gargling with warm salt water will also help reduce throat pain. Dissolve 1/2 teaspoon of salt in 1 glass of warm water. Children can sip on juice or a popsicle. Children 5 years and older can also suck on a lollipop or hard candy.    Don t eat salty or spicy foods or give them to your child. These can be irritating to the throat.  Medicines for a child: You can give your child acetaminophen for fever, fussiness, or discomfort. In babies over 6 months of age, you may use ibuprofen instead of acetaminophen. If your child has chronic liver or kidney disease or ever had a stomach ulcer or GI bleeding, talk with your child s healthcare provider before giving these medicines. Aspirin should never be used by any child under 18 years of age who has a fever. It may cause severe liver damage.  Medicines for an adult: You may use acetaminophen or ibuprofen to control pain or fever, unless another medicine was prescribed for this. If you have chronic liver or kidney disease or ever had a stomach ulcer or GI bleeding, talk with your healthcare provider before using these medicines.  Follow-up care  Follow up with a healthcare provider or our staff if you or your child are not getting better over the next  week.  When to seek medical advice  Call your healthcare provider right away if any of these occur:    Fever as directed by your healthcare provider.  For children, seek care if:  ? Your child is of any age and has repeated fevers above 104 F (40 C).  ? Your child is younger than 2 years of age and has a fever of 100.4 F (38 C) for more than 1 day.  ? Your child is 2 years old or older and has a fever of 100.4 F (38 C) for more than 3 days.    New or worsening ear pain, sinus pain, or headache    Painful lumps in the back of neck    Stiff neck    Lymph nodes are getting larger    Can t swallow liquids, a lot of drooling, or can t open mouth wide due to throat pain    Signs of dehydration, such as very dark urine or no urine, sunken eyes, dizziness    Trouble breathing or noisy breathing    Muffled voice    New rash    Other symptoms are getting worse  Date Last Reviewed: 10/1/2017    9712-0694 [employer]. [address] [place] [address], [place] [address]. All rights reserved. This information is not intended as a substitute for professional medical care. Always follow your healthcare professional's instructions.

## 2019-05-14 NOTE — PROGRESS NOTES
SUBJECTIVE:   Elizabeth Santizo is a 54 year old female who presents to clinic today for the following   health issues:      ENT Symptoms             Symptoms: cc Present Absent Comment   Fever/Chills   x Did have   Fatigue  x     Muscle Aches   x Did have   Eye Irritation   x    Sneezing   x    Nasal Justin/Drg   x    Sinus Pressure/Pain   x    Loss of smell   x    Dental pain   x    Sore Throat  x     Swollen Glands   x    Ear Pain/Fullness   x    Cough   x    Wheeze   x    Chest Pain   x    Shortness of breath   x    Rash   x    Other   x      Symptom duration:  5 days   Symptom severity:  moderate   Treatments tried:  Benadryl, naproxen   Contacts:  none         -------------------------------------    Additional history: as documented    Reviewed  and updated as needed this visit by clinical staff         Reviewed and updated as needed this visit by Provider         Patient Active Problem List   Diagnosis     Hypertension goal BP (blood pressure) < 140/90     Hyperlipidemia with target LDL less than 130     Anxiety     Impaired fasting glucose     Mild episode of recurrent major depressive disorder (H)     Past Surgical History:   Procedure Laterality Date     TUBAL LIGATION         Social History     Tobacco Use     Smoking status: Former Smoker     Packs/day: 0.30     Years: 9.00     Pack years: 2.70     Types: Cigarettes     Start date: 2001     Last attempt to quit: 2/15/2010     Years since quittin.2     Smokeless tobacco: Never Used   Substance Use Topics     Alcohol use: Yes     Alcohol/week: 2.0 oz     Comment: socially     Family History   Problem Relation Age of Onset     Diabetes Mother         passed away in      Hypertension Mother      Hyperlipidemia Mother      C.A.D. Father          55 from MI     Cerebrovascular Disease Father         passed away in      Cerebrovascular Disease Maternal Grandmother         passed away in      Diabetes Maternal Grandmother          passed away in 1998     Hypertension Maternal Grandmother      Hyperlipidemia Maternal Grandmother      Cancer Maternal Aunt         aunt     Diabetes Cousin         3 cousins     Diabetes Other         maternal uncle     Other Cancer Other         aunt - passed away 2003     Diabetes Other         maternal aunt     Diabetes Other         maternal uncle     Diabetes Other         maternal aunt     Breast Cancer No family hx of      Cancer - colorectal No family hx of            ROS:  Constitutional, HEENT, cardiovascular, pulmonary, GI, , musculoskeletal, neuro, skin, endocrine and psych systems are negative, except as otherwise noted.    OBJECTIVE:     /82   Pulse 67   Temp 98.2  F (36.8  C) (Tympanic)   Resp 14   Wt 60.2 kg (132 lb 12.8 oz)   SpO2 96%   BMI 25.94 kg/m    Body mass index is 25.94 kg/m .  GENERAL: healthy, alert and no distress  HENT: ear canals and TM's normal, nose and mouth without ulcers or lesions  NECK: no adenopathy, no asymmetry, masses, or scars and thyroid normal to palpation  RESP: lungs clear to auscultation - no rales, rhonchi or wheezes  CV: regular rate and rhythm, normal S1 S2, no S3 or S4, no murmur, click or rub, no peripheral edema and peripheral pulses strong  MS: no gross musculoskeletal defects noted, no edema    Diagnostic Test Results:  Results for orders placed or performed in visit on 05/14/19 (from the past 24 hour(s))   Rapid strep screen   Result Value Ref Range    Specimen Description Throat     Rapid Strep A Screen       NEGATIVE: No Group A streptococcal antigen detected by immunoassay, await culture report.       ASSESSMENT/PLAN:         1. Throat pain    - Rapid strep screen  - Beta strep group A culture    2. Viral pharyngitis  RSS_ negative- discussed OTC remedies such as salt water rinses / NSAIDS       Patient Instructions     Patient Education     Viral Pharyngitis (Sore Throat)    You or your child have pharyngitis (sore throat). This infection  is caused by a virus. It can cause throat pain that is worse when swallowing, aching all over, headache, and fever. The infection may be spread by coughing, kissing, or touching others after touching your mouth or nose. Antibiotic medicines do not work against viruses. They are not used for treating this illness.  Home care    If symptoms are severe, you or your child should rest at home. Return to work or school when you or your child feel well enough.     You or your child should drink plenty of fluids to prevent dehydration.    Use throat lozenges or numbing throat sprays to help reduce pain. Gargling with warm salt water will also help reduce throat pain. Dissolve 1/2 teaspoon of salt in 1 glass of warm water. Children can sip on juice or a popsicle. Children 5 years and older can also suck on a lollipop or hard candy.    Don t eat salty or spicy foods or give them to your child. These can be irritating to the throat.  Medicines for a child: You can give your child acetaminophen for fever, fussiness, or discomfort. In babies over 6 months of age, you may use ibuprofen instead of acetaminophen. If your child has chronic liver or kidney disease or ever had a stomach ulcer or GI bleeding, talk with your child s healthcare provider before giving these medicines. Aspirin should never be used by any child under 18 years of age who has a fever. It may cause severe liver damage.  Medicines for an adult: You may use acetaminophen or ibuprofen to control pain or fever, unless another medicine was prescribed for this. If you have chronic liver or kidney disease or ever had a stomach ulcer or GI bleeding, talk with your healthcare provider before using these medicines.  Follow-up care  Follow up with a healthcare provider or our staff if you or your child are not getting better over the next week.  When to seek medical advice  Call your healthcare provider right away if any of these occur:    Fever as directed by your  healthcare provider.  For children, seek care if:  ? Your child is of any age and has repeated fevers above 104 F (40 C).  ? Your child is younger than 2 years of age and has a fever of 100.4 F (38 C) for more than 1 day.  ? Your child is 2 years old or older and has a fever of 100.4 F (38 C) for more than 3 days.    New or worsening ear pain, sinus pain, or headache    Painful lumps in the back of neck    Stiff neck    Lymph nodes are getting larger    Can t swallow liquids, a lot of drooling, or can t open mouth wide due to throat pain    Signs of dehydration, such as very dark urine or no urine, sunken eyes, dizziness    Trouble breathing or noisy breathing    Muffled voice    New rash    Other symptoms are getting worse  Date Last Reviewed: 10/1/2017    1987-3064 [employer]. [address] [place] [address]. All rights reserved. This information is not intended as a substitute for professional medical care. Always follow your healthcare professional's instructions.               SOLANGE Mora [facility]

## 2019-05-14 NOTE — NURSING NOTE
Initial /82   Pulse 67   Temp 98.2  F (36.8  C) (Tympanic)   Resp 14   Wt 60.2 kg (132 lb 12.8 oz)   SpO2 96%   BMI 25.94 kg/m   Estimated body mass index is 25.94 kg/m  as calculated from the following:    Height as of 1/17/19: 1.524 m (5').    Weight as of this encounter: 60.2 kg (132 lb 12.8 oz). .    Azeb Hancock

## 2019-05-15 LAB
BACTERIA SPEC CULT: NORMAL
SPECIMEN SOURCE: NORMAL

## 2019-05-17 ENCOUNTER — MYC MEDICAL ADVICE (OUTPATIENT)
Dept: FAMILY MEDICINE | Facility: CLINIC | Age: 54
End: 2019-05-17

## 2019-06-18 ENCOUNTER — MYC REFILL (OUTPATIENT)
Dept: FAMILY MEDICINE | Facility: CLINIC | Age: 54
End: 2019-06-18

## 2019-06-18 DIAGNOSIS — J30.9 ALLERGIC RHINITIS: ICD-10-CM

## 2019-06-18 DIAGNOSIS — J30.2 SEASONAL ALLERGIC RHINITIS, UNSPECIFIED TRIGGER: Primary | ICD-10-CM

## 2019-06-21 NOTE — TELEPHONE ENCOUNTER
Left message for patient to return call to clinic or to check MyChart.     Courtney Alcantara, HIWOTN, RN

## 2019-06-24 RX ORDER — FLUTICASONE PROPIONATE 50 MCG
1 SPRAY, SUSPENSION (ML) NASAL DAILY
Qty: 16 G | Refills: 11 | Status: SHIPPED | OUTPATIENT
Start: 2019-06-24 | End: 2020-03-06

## 2019-06-24 NOTE — TELEPHONE ENCOUNTER
Routing refill request to provider for review/approval because:  A break in medication- see note below.   She has not called back or viewed her [email or web address] note.   I do see she has an appt with Ilana tomorrow about a different issue.   Mayra Yang RNC

## 2019-09-06 DIAGNOSIS — K21.9 GASTROESOPHAGEAL REFLUX DISEASE WITHOUT ESOPHAGITIS: ICD-10-CM

## 2019-09-09 NOTE — TELEPHONE ENCOUNTER
"Requested Prescriptions   Pending Prescriptions Disp Refills     omeprazole (PRILOSEC) 20 MG DR capsule [Pharmacy Med Name: OMEPRAZOLE 20MG CAP] 30 capsule 3     Sig: TAKE 1 CAPSULE BY MOUTH ONCE DAILY   Last Written Prescription Date:  1/21/19  Last Fill Quantity: 30 cap,  # refills: 3   Last office visit: 5/14/2019 with prescribing provider:  ABDIAZIZ Santana   Future Office Visit:   Next 5 appointments (look out 90 days)    Sep 10, 2019  7:20 AM CDT  Office Visit with SOLANGE Mora CNP  [facility] ([facility]  Arrive at: Clinic [facility] [address] [facility] [address]  [phone]             PPI Protocol Passed - 9/6/2019  3:55 PM        Passed - Not on Clopidogrel (unless Pantoprazole ordered)        Passed - No diagnosis of osteoporosis on record        Passed - Recent (12 mo) or future (30 days) visit within the authorizing provider's specialty     Patient had office visit in the last 12 months or has a visit in the next 30 days with authorizing provider or within the authorizing provider's specialty.  See \"Patient Info\" tab in inbasket, or \"Choose Columns\" in Meds & Orders section of the refill encounter.              Passed - Medication is active on med list        Passed - Patient is age 18 or older        Passed - No active pregnacy on record        Passed - No positive pregnancy test in past 12 months          "

## 2019-09-10 NOTE — TELEPHONE ENCOUNTER
"S:  Refill request for omeprazole 20mg cap daily    B:  LOV 5/14/19  Omeprazole 20mg daily last written 1/21/19 for 4 month supply    A:  Requested Prescriptions   Pending Prescriptions Disp Refills     omeprazole (PRILOSEC) 20 MG DR capsule [Pharmacy Med Name: OMEPRAZOLE 20MG CAP] 30 capsule 3     Sig: TAKE 1 CAPSULE BY MOUTH ONCE DAILY       PPI Protocol Passed - 9/9/2019  7:17 AM        Passed - Not on Clopidogrel (unless Pantoprazole ordered)        Passed - No diagnosis of osteoporosis on record        Passed - Recent (12 mo) or future (30 days) visit within the authorizing provider's specialty     Patient had office visit in the last 12 months or has a visit in the next 30 days with authorizing provider or within the authorizing provider's specialty.  See \"Patient Info\" tab in inbasket, or \"Choose Columns\" in Meds & Orders section of the refill encounter.              Passed - Medication is active on med list        Passed - Patient is age 18 or older        Passed - No active pregnacy on record        Passed - No positive pregnancy test in past 12 months        Passes [facility] refill protocol    R:  Filled per [facility] refill protocol    No further action necessary.  Encounter closed.    Conor Sultana RN    "

## 2019-10-06 ENCOUNTER — VIRTUAL VISIT (OUTPATIENT)
Dept: FAMILY MEDICINE | Facility: OTHER | Age: 54
End: 2019-10-06

## 2019-10-06 NOTE — PROGRESS NOTES
"Date: [id number]  Clinician: Tiffanie Romero  Clinician NPI: [id number]  Patient: Elizabeth Santizo  Patient : 1965  Patient Address: [address] [place] [address]  Patient Phone: [phone]  Visit Protocol: URI  Patient Summary:  Elizabeth is a 54 year old ( : 1965 ) female who initiated a Visit for cold, sinus infection, or influenza. When asked the question \"Please sign me up to receive news, health information and promotions from [facility].\", Elizabeth responded \"No\".    Elizabeth states her symptoms started gradually 7-9 days ago.   Her symptoms consist of tooth pain, a headache, malaise, facial pain or pressure, myalgia, chills, and nasal congestion. Elizabeth also feels feverish but was unable to measure her temperature.   Symptom details     Nasal secretions: The color of her mucus is yellow.    Facial pain or pressure: The facial pain or pressure feels worse when bending over or leaning forward.     Headache: She states the headache is moderate (4-6 on a 10 point pain scale).     Tooth pain: The tooth pain is not caused by a cavity, recent dental work, or other mouth problems.      Elizabeth denies having rhinitis, wheezing, sore throat, cough, enlarged lymph nodes, and ear pain. She also denies having recent facial or sinus surgery in the past 60 days, taking antibiotic medication for the symptoms, and double sickening (worsening symptoms after initial improvement). She is not experiencing dyspnea.   Precipitating events  She has not recently been exposed to someone with influenza. Elizabeth has not been in close contact with any high risk individuals.   Pertinent medical history  Elizabeth had 1 sinus infection within the past year.   Elizabeth does not get yeast infections when she takes antibiotics.   Weight: 129 lbs   Elizabeth does not smoke or use smokeless tobacco.     MEDICATIONS: metoprolol tartrate oral, Lipitor oral, ALLERGIES: amoxicillin, amoxicillin  Clinician Response:  Dear Elizabeth,  Based on the information " provided, you have acute bacterial sinusitis, also known as a sinus infection. Sinus infections are caused by bacteria or a virus and symptoms are almost always identical. The difference between the 2 types of infections is timing.  Sinus infections start as viral infections and symptoms improve on their own in about 7 days. If symptoms have not improved after 7 days or have even worsened, a bacterial infection may have developed.  Medication information  I am prescribing:     Azithromycin (Zithromax Z-Yamil) 250 mg oral tablet. Take 2 tablets by mouth on day 1, then 1 tablet by mouth on days 2-5. There are no refills with this prescription.   Yeast infections can be a common side effect of antibiotics. The most common symptom of a yeast infection is itchiness in and around the vagina. Other signs and symptoms include burning, redness, or a thick, white vaginal discharge that looks like cottage cheese and does not have a bad smell.  Self care  The following tips will keep you as comfortable as possible while you recover:     Rest    Drink plenty of water and other liquids    Take a hot shower to loosen congestion     When to seek care  Please be seen in a clinic or urgent care if any of the following occur:     Symptoms do not start to improve after 3 days of treatment    New symptoms develop, or symptoms become worse     It is possible to have an allergic reaction to an antibiotic even if you have not had one in the past. If you notice a new rash, significant swelling, or difficulty breathing, stop taking this medication immediately and go to a clinic or urgent care.   Diagnosis: Acute bacterial sinusitis  Diagnosis ICD: J01.90  Prescription: azithromycin (Zithromax Z-Yamil) 250 mg oral tablet 6 tablet, 5 days supply. Take 2 tablets by mouth on day 1, then 1 tablet by mouth on days 2-5. Refills: 0, Refill as needed: no, Allow substitutions: yes  Pharmacy: [id number] - [phone] - [address] [place] [address]

## 2019-11-10 ENCOUNTER — VIRTUAL VISIT (OUTPATIENT)
Dept: FAMILY MEDICINE | Facility: OTHER | Age: 54
End: 2019-11-10

## 2019-11-10 NOTE — PROGRESS NOTES
"Date: 11/10/2019 14:47:41  Clinician: Ivan Torres  Clinician NPI: [id number]  Patient: Elizabeth Santizo  Patient : 1965  Patient Address: [address] [place] [address]  Patient Phone: [phone]  Visit Protocol: URI  Patient Summary:  Elizabeth is a 54 year old ( : 1965 ) female who initiated a Visit for cold, sinus infection, or influenza. When asked the question \"Please sign me up to receive news, health information and promotions from [employer].\", Elizabeth responded \"Yes\".    Elizabeth states her symptoms started gradually 2-3 weeks ago. After her symptoms started, they improved and then got worse again.   Her symptoms consist of a headache, malaise, tooth pain, facial pain or pressure, myalgia, and nasal congestion.   Symptom details     Nasal secretions: The color of her mucus is yellow.    Facial pain or pressure: The facial pain or pressure feels worse when bending over or leaning forward.     Headache: She states the headache is moderate (4-6 on a 10 point pain scale).     Tooth pain: The tooth pain is not caused by a cavity, recent dental work, or other mouth problems.      Elizabeth denies having sore throat, rhinitis, wheezing, fever, cough, enlarged lymph nodes, chills, and ear pain. She also denies having recent facial or sinus surgery in the past 60 days and taking antibiotic medication for the symptoms. She is not experiencing dyspnea.   Precipitating events  She has not recently been exposed to someone with influenza. Elizabeth has not been in close contact with any high risk individuals.   Pertinent medical history  Elizabeth had 2 sinus infections within the past year.   Elizabeth does not get yeast infections when she takes antibiotics.   Weight: 130 lbs   Elizabeth does not smoke or use smokeless tobacco.     MEDICATIONS: metoprolol tartrate oral, Lipitor oral, ALLERGIES: amoxicillin, amoxicillin  Clinician Response:  Dear Elizabeth,  Based on the information provided, you have acute bacterial sinusitis, also " known as a sinus infection. Sinus infections are caused by bacteria or a virus and symptoms are almost always identical. The difference between the 2 types of infections is timing.  Sinus infections start as viral infections and symptoms improve on their own in about 7 days. If symptoms have not improved after 7 days or have even worsened, a bacterial infection may have developed.  Medication information  I am prescribing:     Azithromycin (Zithromax Z-Yamil) 250 mg oral tablet. Take 2 tablets by mouth on day 1, then 1 tablet by mouth on days 2-5. There are no refills with this prescription.   Yeast infections can be a common side effect of antibiotics. The most common symptom of a yeast infection is itchiness in and around the vagina. Other signs and symptoms include burning, redness, or a thick, white vaginal discharge that looks like cottage cheese and does not have a bad smell.  Self care  The following tips will keep you as comfortable as possible while you recover:     Rest    Drink plenty of water and other liquids    Take a hot shower to loosen congestion     When to seek care  Please be seen in a clinic or urgent care if any of the following occur:     Symptoms do not start to improve after 3 days of treatment    New symptoms develop, or symptoms become worse     It is possible to have an allergic reaction to an antibiotic even if you have not had one in the past. If you notice a new rash, significant swelling, or difficulty breathing, stop taking this medication immediately and go to a clinic or urgent care.   Diagnosis: Acute bacterial sinusitis  Diagnosis ICD: J01.90  Prescription: azithromycin (Zithromax Z-Yamil) 250 mg oral tablet 6 tablet, 5 days supply. Take 2 tablets by mouth on day 1, then 1 tablet by mouth on days 2-5. Refills: 0, Refill as needed: no, Allow substitutions: yes  Pharmacy: [id number] - [phone] - [address] [address]

## 2019-12-16 ENCOUNTER — TELEPHONE (OUTPATIENT)
Dept: FAMILY MEDICINE | Facility: CLINIC | Age: 54
End: 2019-12-16

## 2019-12-16 DIAGNOSIS — I10 ESSENTIAL HYPERTENSION WITH GOAL BLOOD PRESSURE LESS THAN 140/90: ICD-10-CM

## 2019-12-16 DIAGNOSIS — E78.5 HYPERLIPIDEMIA WITH TARGET LDL LESS THAN 130: ICD-10-CM

## 2019-12-16 NOTE — TELEPHONE ENCOUNTER
"Requested Prescriptions   Pending Prescriptions Disp Refills     metoprolol succinate ER (TOPROL-XL) 50 MG 24 hr tablet [Pharmacy Med  Last Written Prescription Date:  11/23/18  Last Fill Quantity: 90,  # refills: 3   Last office visit: 5/14/2019 with prescribing provider:  Ilana Mares   Future Office Visit:     Name: METOPROLOL ER 50MG  TAB]  0     Sig: TAKE 1 TABLET BY MOUTH ONCE DAILY       Beta-Blockers Protocol Passed - 12/16/2019  5:30 AM        Passed - Blood pressure under 140/90 in past 12 months     BP Readings from Last 3 Encounters:   05/14/19 134/82   01/17/19 116/84   11/23/18 122/80           Passed - Patient is age 6 or older        Passed - Recent (12 mo) or future (30 days) visit within the authorizing provider's specialty     Patient has had an office visit with the authorizing provider or a provider within the authorizing providers department within the previous 12 mos or has a future within next 30 days. See \"Patient Info\" tab in inbasket, or \"Choose Columns\" in Meds & Orders section of the refill encounter.          Passed - Medication is active on med list        atorvastatin (LIPITOR) 40 MG tablet [Pharmacy Med Name: ATORVASTATIN  Last Written Prescription Date:  11/23/18  Last Fill Quantity: 90,  # refills: 3   Last office visit: 5/14/2019 with prescribing provider:  Ilana Mares   Future Office Visit:     40MG   TAB]  0     Sig: TAKE 1 TABLET BY MOUTH ONCE DAILY       Statins Protocol Failed - 12/16/2019  5:30 AM        Failed - LDL on file in past 12 months     Recent Labs   Lab Test 11/23/18  0835   *           Passed - No abnormal creatine kinase in past 12 months     No lab results found.         Passed - Recent (12 mo) or future (30 days) visit within the authorizing provider's specialty     Patient has had an office visit with the authorizing provider or a provider within the authorizing providers department within the previous 12 mos or has a future within next 30 " "days. See \"Patient Info\" tab in inbasket, or \"Choose Columns\" in Meds & Orders section of the refill encounter.          Passed - Medication is active on med list        Passed - Patient is age 18 or older        Passed - No active pregnancy on record        Passed - No positive pregnancy test in past 12 months          "

## 2019-12-20 RX ORDER — ATORVASTATIN CALCIUM 40 MG/1
TABLET, FILM COATED ORAL
Qty: 90 TABLET | Refills: 0 | Status: SHIPPED | OUTPATIENT
Start: 2019-12-20 | End: 2020-03-06

## 2019-12-20 RX ORDER — METOPROLOL SUCCINATE 50 MG/1
TABLET, EXTENDED RELEASE ORAL
Qty: 90 TABLET | Refills: 0 | Status: SHIPPED | OUTPATIENT
Start: 2019-12-20 | End: 2020-03-06

## 2020-02-15 ENCOUNTER — VIRTUAL VISIT (OUTPATIENT)
Dept: FAMILY MEDICINE | Facility: OTHER | Age: 55
End: 2020-02-15

## 2020-02-15 NOTE — PROGRESS NOTES
"Date: 02/15/2020 07:23:28  Clinician: Letty Pizarro  Clinician NPI: [id number]  Patient: Elizabeth Santizo  Patient : 1965  Patient Address: [address] [place], [place] [address]  Patient Phone: [phone]  Visit Protocol: URI  Patient Summary:  Elizabeth is a 54 year old ( : 1965 ) female who initiated a Visit for cold, sinus infection, or influenza. When asked the question \"Please sign me up to receive news, health information and promotions from [employer].\", Elizabeth responded \"Yes\".    Elizabeth states her symptoms started gradually 7-9 days ago.   Her symptoms consist of facial pain or pressure, myalgia, a headache, malaise, nasal congestion, rhinitis, and tooth pain.   Symptom details     Nasal secretions: The color of her mucus is yellow.    Facial pain or pressure: The facial pain or pressure feels worse when bending over or leaning forward.     Headache: She states the headache is moderate (4-6 on a 10 point pain scale).     Tooth pain: The tooth pain is not caused by a cavity, recent dental work, or other mouth problems.      Elizabeth denies having cough, sore throat, ear pain, enlarged lymph nodes, chills, fever, and wheezing. She also denies having recent facial or sinus surgery in the past 60 days, double sickening (worsening symptoms after initial improvement), and taking antibiotic medication for the symptoms. She is not experiencing dyspnea.   Precipitating events  She has not recently been exposed to someone with influenza. Elizabeth has not been in close contact with any high risk individuals.   Elizabeth has not traveled internationally in the last 14 days before the start of her symptoms.   Pertinent medical history  Elizabeth had 1 sinus infection within the past year.   Elizabeth does not get yeast infections when she takes antibiotics.   Weight: 125 lbs   Elizabeth does not smoke or use smokeless tobacco.   Weight: 125 lbs    MEDICATIONS: metoprolol tartrate oral, Lipitor oral, ALLERGIES: amoxicillin, " amoxicillin  Clinician Response:  Dear Elizabeth,  Based on the information provided, you have acute bacterial sinusitis, also known as a sinus infection. Sinus infections are caused by bacteria or a virus and symptoms are almost always identical. The difference between the 2 types of infections is timing.  Sinus infections start as viral infections and symptoms improve on their own in about 7 days. If symptoms have not improved after 7 days or have even worsened, a bacterial infection may have developed.  Medication information  I am prescribing:       Fluticasone 50 mcg/actuation nasal spray. Inhale 2 sprays in each nostril 1 time per day; after 1 week, may adjust to 1 - 2 sprays in each nostril 1 time per day. This medication takes several days to start working, so keep taking it even if it doesn't help right away. There are no refills with this prescription.      Azithromycin (Zithromax Z-Yamil) 250 mg oral tablet. Take 2 tablets by mouth on day 1, then 1 tablet by mouth on days 2-5. There are no refills with this prescription.     Yeast infections can be a common side effect of antibiotics. The most common symptom of a yeast infection is itchiness in and around the vagina. Other signs and symptoms include burning, redness, or a thick, white vaginal discharge that looks like cottage cheese and does not have a bad smell.  Unless you are allergic to the over-the-counter medication(s) below, I recommend using:       Acetaminophen (Tylenol or store brand) oral tablet. Take 1-2 tablets by mouth every 4-6 hours to help with the discomfort.      Ibuprofen (Advil or store brand) 200 mg oral tablet. Take 1-3 tablets (200-600 mg) by mouth every 8 hours to help with the discomfort. Make sure to take the ibuprofen with food. Do not exceed 2400 mg in 24 hours.    A decongestant such as Sudafed PE or store brand.      Guaifenesin + dextromethorphan (Robitussin DM, Mucinex DM, or store brand).      Saline nasal spray (Boykins or store  brand). Use 1-2 sprays in each nostril 3 times a day as needed for congestion.     Over-the-counter medications do not require a prescription. Ask the pharmacist if you have any questions.  Self care  The following tips will keep you as comfortable as possible while you recover:     Rest    Drink plenty of water and other liquids    Take a hot shower to loosen congestion     When to seek care  Please be seen in a clinic or urgent care if any of the following occur:     Symptoms do not start to improve after 3 days of treatment    New symptoms develop, or symptoms become worse     It is possible to have an allergic reaction to an antibiotic even if you have not had one in the past. If you notice a new rash, significant swelling, or difficulty breathing, stop taking this medication immediately and go to a clinic or urgent care.   Diagnosis: Acute bacterial sinusitis  Diagnosis ICD: J01.90  Prescription: azithromycin (Zithromax Z-Yamil) 250 mg oral tablet 6 tablet, 5 days supply. Take 2 tablets by mouth on day 1, then 1 tablet by mouth on days 2-5. Refills: 0, Refill as needed: no, Allow substitutions: yes  Prescription: fluticasone 50 mcg/actuation nasal spray,suspension 1 120 spray aerosol with adapter (grams), 30 days supply. Inhale 2 sprays in each nostril 1 time per day; after 1 week, may adjust to 1 - 2 sprays in each nostril 1 time per day.. Refills: 0, Refill as needed: no, Allow substitutions: yes  Pharmacy: [id number] - [phone] - [address] [address]

## 2020-03-02 ENCOUNTER — HEALTH MAINTENANCE LETTER (OUTPATIENT)
Age: 55
End: 2020-03-02

## 2020-03-06 ENCOUNTER — OFFICE VISIT (OUTPATIENT)
Dept: FAMILY MEDICINE | Facility: CLINIC | Age: 55
End: 2020-03-06
Payer: COMMERCIAL

## 2020-03-06 VITALS
SYSTOLIC BLOOD PRESSURE: 120 MMHG | TEMPERATURE: 98.1 F | WEIGHT: 125 LBS | OXYGEN SATURATION: 98 % | DIASTOLIC BLOOD PRESSURE: 82 MMHG | HEIGHT: 60 IN | HEART RATE: 69 BPM | BODY MASS INDEX: 24.54 KG/M2

## 2020-03-06 DIAGNOSIS — I10 ESSENTIAL HYPERTENSION WITH GOAL BLOOD PRESSURE LESS THAN 140/90: ICD-10-CM

## 2020-03-06 DIAGNOSIS — F41.1 GAD (GENERALIZED ANXIETY DISORDER): ICD-10-CM

## 2020-03-06 DIAGNOSIS — T75.3XXA MOTION SICKNESS, INITIAL ENCOUNTER: ICD-10-CM

## 2020-03-06 DIAGNOSIS — J30.2 SEASONAL ALLERGIC RHINITIS, UNSPECIFIED TRIGGER: ICD-10-CM

## 2020-03-06 DIAGNOSIS — E78.5 HYPERLIPIDEMIA WITH TARGET LDL LESS THAN 130: ICD-10-CM

## 2020-03-06 DIAGNOSIS — F33.1 MAJOR DEPRESSIVE DISORDER, RECURRENT EPISODE, MODERATE (H): ICD-10-CM

## 2020-03-06 DIAGNOSIS — Z00.00 ROUTINE GENERAL MEDICAL EXAMINATION AT A HEALTH CARE FACILITY: Primary | ICD-10-CM

## 2020-03-06 DIAGNOSIS — Z12.11 SPECIAL SCREENING FOR MALIGNANT NEOPLASMS, COLON: ICD-10-CM

## 2020-03-06 LAB
ANION GAP SERPL CALCULATED.3IONS-SCNC: 3 MMOL/L (ref 3–14)
BUN SERPL-MCNC: 16 MG/DL (ref 7–30)
CALCIUM SERPL-MCNC: 9.6 MG/DL (ref 8.5–10.1)
CHLORIDE SERPL-SCNC: 105 MMOL/L (ref 94–109)
CHOLEST SERPL-MCNC: 179 MG/DL
CO2 SERPL-SCNC: 29 MMOL/L (ref 20–32)
CREAT SERPL-MCNC: 0.56 MG/DL (ref 0.52–1.04)
GFR SERPL CREATININE-BSD FRML MDRD: >90 ML/MIN/{1.73_M2}
GLUCOSE SERPL-MCNC: 100 MG/DL (ref 70–99)
HDLC SERPL-MCNC: 63 MG/DL
LDLC SERPL CALC-MCNC: 92 MG/DL
NONHDLC SERPL-MCNC: 116 MG/DL
POTASSIUM SERPL-SCNC: 4.2 MMOL/L (ref 3.4–5.3)
SODIUM SERPL-SCNC: 137 MMOL/L (ref 133–144)
TRIGL SERPL-MCNC: 118 MG/DL

## 2020-03-06 PROCEDURE — 36415 COLL VENOUS BLD VENIPUNCTURE: CPT | Performed by: NURSE PRACTITIONER

## 2020-03-06 PROCEDURE — 80061 LIPID PANEL: CPT | Performed by: NURSE PRACTITIONER

## 2020-03-06 PROCEDURE — 99214 OFFICE O/P EST MOD 30 MIN: CPT | Mod: 25 | Performed by: NURSE PRACTITIONER

## 2020-03-06 PROCEDURE — 87624 HPV HI-RISK TYP POOLED RSLT: CPT | Performed by: NURSE PRACTITIONER

## 2020-03-06 PROCEDURE — 99396 PREV VISIT EST AGE 40-64: CPT | Performed by: NURSE PRACTITIONER

## 2020-03-06 PROCEDURE — G0145 SCR C/V CYTO,THINLAYER,RESCR: HCPCS | Performed by: NURSE PRACTITIONER

## 2020-03-06 PROCEDURE — 80048 BASIC METABOLIC PNL TOTAL CA: CPT | Performed by: NURSE PRACTITIONER

## 2020-03-06 RX ORDER — ATORVASTATIN CALCIUM 40 MG/1
40 TABLET, FILM COATED ORAL DAILY
Qty: 90 TABLET | Refills: 3 | Status: SHIPPED | OUTPATIENT
Start: 2020-03-06 | End: 2021-03-18

## 2020-03-06 RX ORDER — METOPROLOL SUCCINATE 50 MG/1
50 TABLET, EXTENDED RELEASE ORAL DAILY
Qty: 90 TABLET | Refills: 3 | Status: SHIPPED | OUTPATIENT
Start: 2020-03-06 | End: 2021-03-18

## 2020-03-06 RX ORDER — FLUTICASONE PROPIONATE 50 MCG
1 SPRAY, SUSPENSION (ML) NASAL DAILY
Qty: 16 G | Refills: 11 | Status: SHIPPED | OUTPATIENT
Start: 2020-03-06 | End: 2020-11-06

## 2020-03-06 RX ORDER — TRAZODONE HYDROCHLORIDE 50 MG/1
25-50 TABLET, FILM COATED ORAL
Qty: 90 TABLET | Refills: 3 | Status: SHIPPED | OUTPATIENT
Start: 2020-03-06 | End: 2021-03-18

## 2020-03-06 RX ORDER — SCOLOPAMINE TRANSDERMAL SYSTEM 1 MG/1
1 PATCH, EXTENDED RELEASE TRANSDERMAL
Qty: 7 PATCH | Refills: 1 | Status: SHIPPED | OUTPATIENT
Start: 2020-03-06 | End: 2020-07-29

## 2020-03-06 ASSESSMENT — MIFFLIN-ST. JEOR: SCORE: 1093.5

## 2020-03-06 NOTE — LETTER
March 6, 2020      Elizabeth Santizo  [address] [facility]  [place] [address]        Dear ,    We are writing to inform you of your test results.    Kidney function and electrolytes are normal.   Cholesterol is better than last year!     Resulted Orders   Lipid panel reflex to direct LDL Fasting   Result Value Ref Range    Cholesterol 179 <200 mg/dL    Triglycerides 118 <150 mg/dL    HDL Cholesterol 63 >49 mg/dL    LDL Cholesterol Calculated 92 <100 mg/dL      Comment:      Desirable:       <100 mg/dl    Non HDL Cholesterol 116 <130 mg/dL   Basic metabolic panel   Result Value Ref Range    Sodium 137 133 - 144 mmol/L    Potassium 4.2 3.4 - 5.3 mmol/L    Chloride 105 94 - 109 mmol/L    Carbon Dioxide 29 20 - 32 mmol/L    Anion Gap 3 3 - 14 mmol/L    Glucose 100 (H) 70 - 99 mg/dL    Urea Nitrogen 16 7 - 30 mg/dL    Creatinine 0.56 0.52 - 1.04 mg/dL    GFR Estimate >90 >60 mL/min/[1.73_m2]      Comment:      Non  GFR Calc  Starting 12/18/2018, serum creatinine based estimated GFR (eGFR) will be   calculated using the Chronic Kidney Disease Epidemiology Collaboration   (CKD-EPI) equation.      GFR Estimate If Black >90 >60 mL/min/[1.73_m2]      Comment:       GFR Calc  Starting 12/18/2018, serum creatinine based estimated GFR (eGFR) will be   calculated using the Chronic Kidney Disease Epidemiology Collaboration   (CKD-EPI) equation.      Calcium 9.6 8.5 - 10.1 mg/dL       If you have any questions or concerns, please call the clinic at the number listed above.       Sincerely,        SOLANGE Wall CNP

## 2020-03-06 NOTE — PROGRESS NOTES
SUBJECTIVE:   CC: Elizabeth Santizo is an 54 year old woman who presents for preventive health visit.     Healthy Habits:    Do you get at least three servings of calcium containing foods daily (dairy, green leafy vegetables, etc.)? Yes- possibly;  Has been dieting    Amount of exercise or daily activities, outside of work: steps at work; no specific exercise    Problems taking medications regularly No    Medication side effects: fatigue    Have you had an eye exam in the past two years? yes    Do you see a dentist twice per year? yes    Do you have sleep apnea, excessive snoring or daytime drowsiness?no      PROBLEMS TO ADD ON...  Hyperlipidemia Follow-Up      Are you regularly taking any medication or supplement to lower your cholesterol?   Yes- lipitor    Are you having muscle aches or other side effects that you think could be caused by your cholesterol lowering medication?  Yes- .    Hypertension Follow-up      Do you check your blood pressure regularly outside of the clinic? No     Are you following a low salt diet? Yes, doesn't add salt    Are your blood pressures ever more than 140 on the top number (systolic) OR more   than 90 on the bottom number (diastolic), for example 140/90?  Doesn't check    Depression and Anxiety Follow-Up    How are you doing with your depression since your last visit? Improved     How are you doing with your anxiety since your last visit?  Improved     Are you having other symptoms that might be associated with depression or anxiety? No    Have you had a significant life event? No     Do you have any concerns with your use of alcohol or other drugs? No    Social History     Tobacco Use     Smoking status: Former Smoker     Packs/day: 0.30     Years: 9.00     Pack years: 2.70     Types: Cigarettes     Start date: 11/1/2001     Last attempt to quit: 2/15/2010     Years since quitting: 10.0     Smokeless tobacco: Never Used   Substance Use Topics     Alcohol use: Yes      Alcohol/week: 3.3 standard drinks     Comment: socially     Drug use: No     PHQ 4/11/2018 6/18/2018 11/23/2018   PHQ-9 Total Score 5 7 3   Q9: Thoughts of better off dead/self-harm past 2 weeks Not at all Not at all Not at all     PATRICIA-7 SCORE 3/23/2018 4/11/2018 6/18/2018   Total Score 8 9 8       Today's PHQ-2 Score:   PHQ-2 ( 1999 [employer]) 3/6/2020 11/21/2018   Q1: Little interest or pleasure in doing things 0 0   Q2: Feeling down, depressed or hopeless 0 0   PHQ-2 Score 0 0   Q1: Little interest or pleasure in doing things - Not at all   Q2: Feeling down, depressed or hopeless - Not at all   PHQ-2 Score - 0       Abuse: Current or Past(Physical, Sexual or Emotional)- No  Do you feel safe in your environment? Yes      Social History     Tobacco Use     Smoking status: Former Smoker     Packs/day: 0.30     Years: 9.00     Pack years: 2.70     Types: Cigarettes     Start date: 11/1/2001     Last attempt to quit: 2/15/2010     Years since quitting: 10.0     Smokeless tobacco: Never Used   Substance Use Topics     Alcohol use: Yes     Alcohol/week: 3.3 standard drinks     Comment: socially     If you drink alcohol do you typically have >3 drinks per day or >7 drinks per week? No                     Reviewed orders with patient.  Reviewed health maintenance and updated orders accordingly - Yes          Pertinent mammograms are reviewed under the imaging tab.    History of abnormal Pap smear: YES - updated in Problem List and Health Maintenance accordingly  PAP / HPV Latest Ref Rng & Units 7/21/2017   PAP - NIL   HPV 16 DNA NEG Negative   HPV 18 DNA NEG Negative   OTHER HR HPV NEG Negative     Reviewed and updated as needed this visit by clinical staff  Tobacco  Allergies  Meds  Med Hx  Surg Hx  Fam Hx  Soc Hx        Reviewed and updated as needed this visit by Provider            ROS:  CONSTITUTIONAL: NEGATIVE for fever, chills, change in weight  INTEGUMENTARY/SKIN: NEGATIVE for worrisome rashes, moles or  lesions  EYES: NEGATIVE for vision changes or irritation  ENT: NEGATIVE for ear, mouth and throat problems  RESP: NEGATIVE for significant cough or SOB  BREAST: NEGATIVE for masses, tenderness or discharge  CV: NEGATIVE for chest pain, palpitations or peripheral edema  GI: NEGATIVE for nausea, abdominal pain, heartburn, or change in bowel habits  : NEGATIVE for unusual urinary or vaginal symptoms. No vaginal bleeding.  MUSCULOSKELETAL: NEGATIVE for significant arthralgias or myalgia  NEURO: NEGATIVE for weakness, dizziness or paresthesias  PSYCHIATRIC: NEGATIVE for changes in mood or affect     OBJECTIVE:   /82 (BP Location: Right arm)   Pulse 69   Temp 98.1  F (36.7  C) (Tympanic)   Ht 1.524 m (5')   Wt 56.7 kg (125 lb)   LMP 07/01/2018   SpO2 98%   BMI 24.41 kg/m    EXAM:  GENERAL APPEARANCE: healthy, alert and no distress  EYES: Eyes grossly normal to inspection, PERRL and conjunctivae and sclerae normal  HENT: ear canals and TM's normal, nose and mouth without ulcers or lesions, oropharynx clear and oral mucous membranes moist  NECK: no adenopathy, no asymmetry, masses, or scars and thyroid normal to palpation  RESP: lungs clear to auscultation - no rales, rhonchi or wheezes  BREAST: normal without masses, tenderness or nipple discharge and no palpable axillary masses or adenopathy  CV: regular rate and rhythm, normal S1 S2, no S3 or S4, no murmur, click or rub, no peripheral edema and peripheral pulses strong  ABDOMEN: soft, nontender, no hepatosplenomegaly, no masses and bowel sounds normal   (female): normal female external genitalia, normal urethral meatus, vaginal mucosal atrophy noted, normal cervix, adnexae, and uterus without masses or abnormal discharge  MS: no musculoskeletal defects are noted and gait is age appropriate without ataxia  SKIN: no suspicious lesions or rashes  NEURO: Normal strength and tone, sensory exam grossly normal, mentation intact and speech normal  PSYCH:  mentation appears normal and affect normal/bright        ASSESSMENT/PLAN:       ICD-10-CM    1. Routine general medical examination at a health care facility Z00.00 Pap imaged thin layer screen with HPV - recommended age 30 - 65     HPV High Risk Types DNA Cervical     *MA Screening Digital Bilateral     2. Hyperlipidemia with target LDL less than 130 E78.5 Continue atorvastatin (LIPITOR) 40 MG tablet daily.     Lipid panel reflex to direct LDL Fasting - recheck today     OFFICE/OUTPT VISIT,EST,LEVL III     3. Essential hypertension with goal blood pressure less than 140/90 I10 Well controlled.  Continue metoprolol succinate ER (TOPROL-XL) 50 MG 24 hr tablet     Basic metabolic panel     OFFICE/OUTPT VISIT,EST,LEVL III     4. Major depressive disorder, recurrent episode, moderate (H) F33.1 Well controlled.  traZODone (DESYREL) 50 MG tablet     OFFICE/OUTPT VISIT,EST,LEVL III     5. PATRICIA (generalized anxiety disorder) F41.1 Well controlled.  traZODone (DESYREL) 50 MG tablet     OFFICE/OUTPT VISIT,EST,LEVL III     6. Seasonal allergic rhinitis, unspecified trigger J30.2 fluticasone (FLONASE) 50 MCG/ACT nasal spray     OFFICE/OUTPT VISIT,EST,LEVL III     7. Special screening for malignant neoplasms, colon Z12.11 Fecal colorectal cancer screen FIT       COUNSELING:   Reviewed preventive health counseling, as reflected in patient instructions    Estimated body mass index is 24.41 kg/m  as calculated from the following:    Height as of this encounter: 1.524 m (5').    Weight as of this encounter: 56.7 kg (125 lb).       reports that she quit smoking about 10 years ago. Her smoking use included cigarettes. She started smoking about 18 years ago. She has a 2.70 pack-year smoking history. She has never used smokeless tobacco.    The risks, benefits and treatment options of prescribed medications or other treatments have been discussed with the patient. The patient verbalized their understanding and should call or follow up if  no improvement or if they develop further problems.    SOLANGE Wall CNP  [facility]

## 2020-03-06 NOTE — PATIENT INSTRUCTIONS
Schedule mammogram: [phone]      Preventative Care Visits include: Yearly physicals, Well-child visits, Welcome to Medicare visits, & Medicare yearly wellness exams.    The purpose of these visits is to discuss your medical history and prevent health problems before you are sick.  You may need to pay a copay, coinsurance or deductible if your visit today includes testing or treating for a new or existing condition.    Additional charges may be incurred for today's visit. If you have questions about what your insurance plan covers, please contact your Insurance Company's member service department.  If you have questions specific to a bill you have already received from [employer], please contact the [employer] Billing Office at [phone].     Preventive Health Recommendations  Female Ages 50 - 64    Yearly exam: See your health care provider every year in order to  o Review health changes.   o Discuss preventive care.    o Review your medicines if your doctor has prescribed any.      Get a Pap test every three years (unless you have an abnormal result and your provider advises testing more often).    If you get Pap tests with HPV test, you only need to test every 5 years, unless you have an abnormal result.     You do not need a Pap test if your uterus was removed (hysterectomy) and you have not had cancer.    You should be tested each year for STDs (sexually transmitted diseases) if you're at risk.     Have a mammogram every 1 to 2 years.    Have a colonoscopy at age 50, or have a yearly FIT test (stool test). These exams screen for colon cancer.      Have a cholesterol test every 5 years, or more often if advised.    Have a diabetes test (fasting glucose) every three years. If you are at risk for diabetes, you should have this test more often.     If you are at risk for osteoporosis (brittle bone disease), think about having a bone density scan (DEXA).    Shots: Get a flu shot each year. Get a tetanus shot  every 10 years.    Nutrition:     Eat at least 5 servings of fruits and vegetables each day.    Eat whole-grain bread, whole-wheat pasta and brown rice instead of white grains and rice.    Get adequate Calcium and Vitamin D.     Lifestyle    Exercise at least 150 minutes a week (30 minutes a day, 5 days a week). This will help you control your weight and prevent disease.    Limit alcohol to one drink per day.    No smoking.     Wear sunscreen to prevent skin cancer.     See your dentist every six months for an exam and cleaning.    See your eye doctor every 1 to 2 years.

## 2020-03-11 LAB
COPATH REPORT: NORMAL
PAP: NORMAL

## 2020-03-13 LAB
FINAL DIAGNOSIS: NORMAL
HPV HR 12 DNA CVX QL NAA+PROBE: NEGATIVE
HPV16 DNA SPEC QL NAA+PROBE: NEGATIVE
HPV18 DNA SPEC QL NAA+PROBE: NEGATIVE
SPECIMEN DESCRIPTION: NORMAL
SPECIMEN SOURCE CVX/VAG CYTO: NORMAL

## 2020-05-18 ENCOUNTER — VIRTUAL VISIT (OUTPATIENT)
Dept: FAMILY MEDICINE | Facility: OTHER | Age: 55
End: 2020-05-18

## 2020-05-18 NOTE — PROGRESS NOTES
"Date: 2020 18:14:16  Clinician: Coleen Steve  Clinician NPI: [id number]  Patient: Elizabeth Santioz  Patient : 1965  Patient Address: [address] [place], [place] [address]  Patient Phone: [phone]  Visit Protocol: URI  Patient Summary:  Elizabeth is a 55 year old ( : 1965 ) female who initiated a Visit for cold, sinus infection, or influenza. When asked the question \"Please sign me up to receive news, health information and promotions from [employer].\", Elizabeth responded \"No\".    Elizabeth states her symptoms started gradually 7-9 days ago.   Her symptoms consist of tooth pain, facial pain or pressure, chills, nasal congestion, rhinitis, a headache, malaise, and myalgia.   Symptom details     Nasal secretions: The color of her mucus is yellow.    Facial pain or pressure: The facial pain or pressure feels worse when bending over or leaning forward.     Headache: She states the headache is moderate (4-6 on a 10 point pain scale).     Tooth pain: The tooth pain is not caused by a cavity, recent dental work, or other mouth problems.      Elizabeth denies having nausea, ageusia, diarrhea, sore throat, wheezing, enlarged lymph nodes, fever, cough, vomiting, ear pain, and anosmia. She also denies having recent facial or sinus surgery in the past 60 days, double sickening (worsening symptoms after initial improvement), and taking antibiotic medication for the symptoms. She is not experiencing dyspnea.   Precipitating events  She has not recently been exposed to someone with influenza. Elizabeth has not been in close contact with any high risk individuals.   Pertinent COVID-19 (Coronavirus) information  In the past 14 days, Elizabeth has not worked in a congregate living setting.   She does not work or volunteer as healthcare worker or a  and does not work or volunteer in a healthcare facility.   Elizabeth also has not lived in a congregate living setting in the past 14 days. She does not live with a healthcare " worker.   Elizabeth has not had a close contact with a laboratory-confirmed COVID-19 patient within 14 days of symptom onset.   Pertinent medical history  Elizabeth had 3 sinus infections within the past year.   Elizabeth does not get yeast infections when she takes antibiotics.   Elizabeth does not need a return to work/school note.   Weight: 125 lbs   Elizabeth does not smoke or use smokeless tobacco.   Weight: 125 lbs    MEDICATIONS: metoprolol tartrate oral, Lipitor oral, ALLERGIES: amoxicillin, amoxicillin  Clinician Response:  Dear Elizabeth,  Based on the information provided, you have acute bacterial sinusitis, also known as a sinus infection. Sinus infections are caused by bacteria or a virus and symptoms are almost always identical. The difference between the 2 types of infections is timing.  Sinus infections start as viral infections and symptoms improve on their own in about 7 days. If symptoms have not improved after 7 days or have even worsened, a bacterial infection may have developed.  Medication information  I am prescribing:     Doxycycline hyclate 100 mg oral tablet. Take 1 tablet by mouth 2 times per day for 7 days. There are no refills with this prescription.   Certain antibiotics such as Doxycycline, levofloxacin, and moxifloxacin can cause your skin to be more sensitive to the sun. Exposure to the sun when taking these antibiotics may cause skin rash, itching, redness, or a severe sunburn. Be sure to avoid prolonged or direct exposure to the sun, especially between 10 am and 3 pm, if possible. Wear protective clothing and use sunscreen of SPF 30 or higher when you are outdoors.  Yeast infections can be a common side effect of antibiotics. The most common symptom of a yeast infection is itchiness in and around the vagina. Other signs and symptoms include burning, redness, or a thick, white vaginal discharge that looks like cottage cheese and does not have a bad smell.  Unless you are allergic to the over-the-counter  medication(s) below, I recommend using:     Ibuprofen (Advil or store brand) 200 mg oral tablet. Take 1-3 tablets (200-600 mg) by mouth every 8 hours to help with the discomfort. Make sure to take the ibuprofen with food. Do not exceed 2400 mg in 24 hours.   Over-the-counter medications do not require a prescription. Ask the pharmacist if you have any questions.  Self care  Steps you can take to be as comfortable as possible:     Rest.    Drink plenty of fluids.    Take a warm shower to loosen congestion    Use a cool-mist humidifier.     When to seek care  Please be seen in a clinic or urgent care if any of the following occur:     New symptoms develop, or symptoms become worse    Symptoms do not start to improve after 3 days of treatment     Call ahead before going to the clinic or urgent care.  It is possible to have an allergic reaction to an antibiotic even if you have not had one in the past. If you notice a new rash, significant swelling, or difficulty breathing, stop taking this medication immediately and go to a clinic or urgent care.  COVID-19 (Coronavirus) General Information  Because there is currently no vaccine to prevent infection, the best way to protect yourself is to avoid being exposed to this virus. Common symptoms of COVID-19 include but are not limited to fever, cough, and shortness of breath. These symptoms appear 2-14 days after you are exposed to the virus that causes COVID-19. Click here for more information from the CDC on how to protect yourself.  If you are sick with COVID-19 or suspect you are infected with the virus that causes COVID-19, follow the steps here from the CDC to help prevent the disease from spreading to people in your home and community.  Click here for general information from the CDC on testing.  If you develop any of these emergency warning signs for COVID-19, get medical attention immediately:     Trouble breathing    Persistent pain or pressure in the chest    New  confusion or inability to arouse    Bluish lips or face      Call your doctor or clinic before going in. Call 911 if you have a medical emergency and notify the  you have or think you may have COVID-19.  For more detailed and up to date information on COVID-19 (Coronavirus), please visit the CDC website.   Diagnosis: Acute bacterial sinusitis  Diagnosis ICD: J01.90  Prescription: doxycycline hyclate 100 mg oral tablet 14 tablet, 7 days supply. Take 1 tablet by mouth 2 times per day for 7 days. Refills: 0, Refill as needed: no, Allow substitutions: yes  Pharmacy: [id number] - [phone] - [address] [address]

## 2020-07-29 ENCOUNTER — TELEPHONE (OUTPATIENT)
Dept: FAMILY MEDICINE | Facility: CLINIC | Age: 55
End: 2020-07-29

## 2020-07-29 ENCOUNTER — VIRTUAL VISIT (OUTPATIENT)
Dept: FAMILY MEDICINE | Facility: CLINIC | Age: 55
End: 2020-07-29
Payer: COMMERCIAL

## 2020-07-29 DIAGNOSIS — R19.7 DIARRHEA, UNSPECIFIED TYPE: ICD-10-CM

## 2020-07-29 DIAGNOSIS — Z20.822 SUSPECTED COVID-19 VIRUS INFECTION: Primary | ICD-10-CM

## 2020-07-29 PROCEDURE — 99213 OFFICE O/P EST LOW 20 MIN: CPT | Mod: TEL | Performed by: NURSE PRACTITIONER

## 2020-07-29 ASSESSMENT — ENCOUNTER SYMPTOMS
CONSTIPATION: 0
MUSCULOSKELETAL NEGATIVE: 1
FEVER: 0
CARDIOVASCULAR NEGATIVE: 1
DIARRHEA: 1
HEADACHES: 1
VOMITING: 0
RESPIRATORY NEGATIVE: 1
MYALGIAS: 0
EYES NEGATIVE: 1
ABDOMINAL PAIN: 1
NAUSEA: 0
CHILLS: 0

## 2020-07-29 ASSESSMENT — PATIENT HEALTH QUESTIONNAIRE - PHQ9: SUM OF ALL RESPONSES TO PHQ QUESTIONS 1-9: 0

## 2020-07-29 NOTE — TELEPHONE ENCOUNTER
Advised virtual visit. Had a headache and multiple episodes of diarrhea this am . She has no headache now and no episodes of diarrhea for an hour and a half.   Anjali Jack RN

## 2020-07-29 NOTE — TELEPHONE ENCOUNTER
Reason for Call:  Other test    Detailed comments: Patient has called into work today and do to diarrhea she said she ate bad cherry's cause her  had it for 6 hours hers slowed down now. But her employer is stating she has to have a COVID test before returning to work.     Phone Number Patient can be reached at: Home number on file [phone] (home)    Best Time: any    Can we leave a detailed message on this number? YES    Call taken on 7/29/2020 at 2:15 PM by Melissa Krueger

## 2020-07-29 NOTE — PATIENT INSTRUCTIONS
Stay hydrated.    BRAT (bananas, rice, apples, toast) diet and yogurt.     Avoid dairy and spicy foods until stools return to normal.     COVID testing ordered. They will contact you to schedule appt and will follow-up with results.    Follow-up if diarrhea returns or worsens and you develop symptoms of dehydration as discussed over the phone.       Patient Education     Treating Diarrhea    Diarrhea happens when you have loose, watery, or frequent bowel movements. It is a common problem with many causes. Most cases of diarrhea clear up on their own. But certain cases may need treatment. Be sure to see your healthcare provider if your symptoms do not improve within a few days.  Getting relief  Treatment of diarrhea depends on its cause. Diarrhea caused by bacterial or parasite infection is often treated with antibiotics. Diarrhea caused by other factors, such as a stomach virus, often improves with simple home treatment. The tips below may also help relieve your symptoms.    Drink plenty of fluids. This helps prevent too much fluid loss (dehydration). Water, clear soups, and electrolyte solutions are good choices. Avoid alcohol, coffee, tea, and milk. These can irritate your intestines and make symptoms worse.    Suck on ice chips if drinking makes you queasy.    Return to your normal diet slowly. You may want to eat bland foods at first, such as rice and toast. Also, you may need to avoid certain foods for a while, such as dairy products. These can make symptoms worse. Ask your healthcare provider if there are any other foods you should avoid.    If you were prescribed antibiotics, take them as directed.    Do not take anti-diarrhea medicines without asking your healthcare provider first.  Call your healthcare provider   Call your healthcare provider if you have any of the following:     A fever of 100.4 F (38.0 C) or higher, or as directed by your healthcare provider    Severe pain    Worsening diarrhea or  diarrhea for more than 2 days    Bloody vomit or stool    Signs of dehydration (dizziness, dry mouth and tongue, rapid pulse, dark urine)  Date Last Reviewed: 7/1/2016 2000-2019 [employer]. [address] [place] [address], [place] [address]. All rights reserved. This information is not intended as a substitute for professional medical care. Always follow your healthcare professional's instructions.

## 2020-07-29 NOTE — PROGRESS NOTES
"Elizabeth Santizo is a 55 year old female who is being evaluated via a billable telephone visit.      The patient has been notified of following:     \"This telephone visit will be conducted via a call between you and your physician/provider. We have found that certain health care needs can be provided without the need for a physical exam.  This service lets us provide the care you need with a short phone conversation.  If a prescription is necessary we can send it directly to your pharmacy.  If lab work is needed we can place an order for that and you can then stop by our lab to have the test done at a later time.    Telephone visits are billed at different rates depending on your insurance coverage. During this emergency period, for some insurers they may be billed the same as an in-person visit.  Please reach out to your insurance provider with any questions.    If during the course of the call the physician/provider feels a telephone visit is not appropriate, you will not be charged for this service.\"    Patient has given verbal consent for Telephone visit?  Yes    What phone number would you like to be contacted at? [phone]    How would you like to obtain your AVS? Kourtney Lindquist     Elizabeth Santizo is a 55 year old female who presents via phone visit today for the following health issues:    HPI    Diarrhea  Onset: 2 am this morning    Description:   Consistency of stool: watery, runny, loose, explosive and mucousy  Blood in stool: no   Number of loose stools in past 24 hours: 10-12    Progression of Symptoms:  Improving; it's been two hours with no symptoms but she hasn't eaten anything recently.     Accompanying Signs & Symptoms:  Fever: no   Nausea or vomiting; no   Abdominal pain: YES- cramps; no cramps x couple hours  Episodes of constipation: no   Weight loss: no   Headache: Yes: very slight headache    History:   Ill contacts: YES-  also had symptoms x 1 time after eating " "cherries. He has a history of colitis. Symptoms got better.   Recent use of antibiotics: no    Recent travels: no          Recent medication-new or changes(Rx or OTC): no     Precipitating factors:   Possible food poisoning: bad cherries  Employer is requiring a covid test because she called in \"sick\" to work today.     Alleviating factors:   Not eating.    Therapies Tried and outcome:  hasn't had anything to eat since 8 pm last night. Has been drinking a lot of water to see if it would flush out of system.  Outcome: symptoms improving.     Additional provider notes: Diarrhea and abdominal pain started yesterday after eating some cherries.  also ate the same cherries and had abdominal pain and diarrhea. Her abdominal pain and diarrhea have stopped as of 1pm. Suspect \"bad cherries\" and not COVID, but since she called out \"sick\" work is requiring COVID testing before she can return. Denies fever, cough, rash.         Patient Active Problem List   Diagnosis     Hypertension goal BP (blood pressure) < 140/90     Hyperlipidemia with target LDL less than 130     Anxiety     Impaired fasting glucose     Mild episode of recurrent major depressive disorder (H)     Past Surgical History:   Procedure Laterality Date     TUBAL LIGATION         Social History     Tobacco Use     Smoking status: Former Smoker     Packs/day: 0.30     Years: 9.00     Pack years: 2.70     Types: Cigarettes     Start date: 2001     Last attempt to quit: 2/15/2010     Years since quitting: 10.4     Smokeless tobacco: Never Used   Substance Use Topics     Alcohol use: Yes     Alcohol/week: 3.3 standard drinks     Comment: socially     Family History   Problem Relation Age of Onset     Diabetes Mother         passed away in      Hypertension Mother      Hyperlipidemia Mother      C.A.D. Father          55 from [place]     Cerebrovascular Disease Father         passed away in      Cerebrovascular Disease Maternal Grandmother        "  passed away in 1998     Diabetes Maternal Grandmother         passed away in 1998     Hypertension Maternal Grandmother      Hyperlipidemia Maternal Grandmother      Cancer Maternal Aunt         aunt     Diabetes Cousin         3 cousins     Diabetes Other         maternal uncle     Other Cancer Other         aunt - passed away 2003     Diabetes Other         maternal aunt     Diabetes Other         maternal uncle     Diabetes Other         maternal aunt     Breast Cancer No family hx of      Cancer - colorectal No family hx of          Current Outpatient Medications   Medication Sig Dispense Refill     Ascorbic Acid (VITAMIN C PO) Take 500 mg by mouth daily        atorvastatin (LIPITOR) 40 MG tablet Take 1 tablet (40 mg) by mouth daily 90 tablet 3     Cyanocobalamin (B-12) 100 MCG TABS Take by mouth daily as needed        fluticasone (FLONASE) 50 MCG/ACT nasal spray Spray 1 spray into both nostrils daily 16 g 11     metoprolol succinate ER (TOPROL-XL) 50 MG 24 hr tablet Take 1 tablet (50 mg) by mouth daily 90 tablet 3     Omega-3 Fatty Acids (OMEGA 3 PO) Take 2,400 mg by mouth daily       omeprazole (PRILOSEC) 20 MG DR capsule TAKE 1 CAPSULE BY MOUTH ONCE DAILY (Patient taking differently: daily as needed ) 90 capsule 1     traZODone (DESYREL) 50 MG tablet Take 0.5-1 tablets (25-50 mg) by mouth nightly as needed for sleep 90 tablet 3     Allergies   Allergen Reactions     Amoxicillin Rash     BP Readings from Last 3 Encounters:   03/06/20 120/82   05/14/19 134/82   01/17/19 116/84    Wt Readings from Last 3 Encounters:   03/06/20 56.7 kg (125 lb)   05/14/19 60.2 kg (132 lb 12.8 oz)   01/17/19 60.6 kg (133 lb 9.6 oz)                    Reviewed and updated as needed this visit by Provider         Review of Systems   Constitutional: Negative for chills and fever.   HENT: Negative.    Eyes: Negative.    Respiratory: Negative.    Cardiovascular: Negative.    Gastrointestinal: Positive for abdominal pain (cramps) and  diarrhea (watery, runny, loose, explosive, mucousy). Negative for constipation, nausea and vomiting.   Genitourinary: Negative.    Musculoskeletal: Negative.  Negative for myalgias.   Skin: Negative for rash.   Neurological: Positive for headaches.             Objective   Reported vitals:  [facility] 09/23/2018    healthy, alert, no distress and cooperative  PSYCH: Alert and oriented times 3; coherent speech, normal   rate and volume, able to articulate logical thoughts, able   to abstract reason, no tangential thoughts, no hallucinations   or delusions  Her affect is normal and pleasant  RESP: No cough, no audible wheezing, able to talk in full sentences  Remainder of exam unable to be completed due to telephone visits    Diagnostic Test Results:  Labs reviewed in Epic        Assessment/Plan:    1. Suspected COVID-19 virus infection  Likely food poisoning but due to work requiring COVID testing prior to returning to work, COVID PCR ordered. Discussed that she will be called to schedule testing from a blocked number and then will be called with results several days after testing. Instructed to wear a mask to test site once she has appt scheduled.     - Symptomatic COVID-19 Virus (Coronavirus) by PCR; Future    2. Diarrhea, unspecified type  Recommended hydration, BRAT diet and yogurt. Instructed to avoid dairy products and spicy foods.       No follow-ups on file.      Phone call duration:  8 minutes    SOLANGE Cheney CNP

## 2020-08-24 ENCOUNTER — VIRTUAL VISIT (OUTPATIENT)
Dept: FAMILY MEDICINE | Facility: OTHER | Age: 55
End: 2020-08-24
Payer: COMMERCIAL

## 2020-08-24 PROCEDURE — 99421 OL DIG E/M SVC 5-10 MIN: CPT | Performed by: INTERNAL MEDICINE

## 2020-08-25 NOTE — PROGRESS NOTES
"Date: 2020 20:23:29  Clinician: Yesi Aceves  Clinician NPI: [id number]  Patient: Elizabeth Santizo  Patient : 1965  Patient Address: [address] [place], [place] [address], [place] [address]  Patient Phone: [phone]  Visit Protocol: URI  Patient Summary:  Elizabeth is a 55 year old ( : 1965 ) female who initiated a Visit for cold, sinus infection, or influenza. When asked the question \"Please sign me up to receive news, health information and promotions from [employer].\", Elizabeth responded \"No\".    Elizabeth states her symptoms started gradually 7-9 days ago.   Her symptoms consist of a headache, chills, malaise, tooth pain, nasal congestion, rhinitis, myalgia, and facial pain or pressure.   Symptom details     Nasal secretions: The color of her mucus is yellow.    Facial pain or pressure: The facial pain or pressure feels worse when bending over or leaning forward.     Headache: She states the headache is moderate (4-6 on a 10 point pain scale).     Tooth pain: The tooth pain is not caused by a cavity, recent dental work, or other mouth problems.      Elizabeth denies having ear pain, enlarged lymph nodes, fever, wheezing, sore throat, ageusia, diarrhea, cough, vomiting, nausea, and anosmia. She also denies having recent facial or sinus surgery in the past 60 days, taking antibiotic medication in the past month, and double sickening (worsening symptoms after initial improvement). She is not experiencing dyspnea.   Precipitating events  She has not recently been exposed to someone with influenza. Elizabeth has not been in close contact with any high risk individuals.   Pertinent COVID-19 (Coronavirus) information  In the past 14 days, Elizabeth has not worked in a congregate living setting.   She does not work or volunteer as healthcare worker or a  and does not work or volunteer in a healthcare facility.   Elizabeth also has not lived in a congregate living setting in the past 14 days. She does not live with a healthcare " worker.   Elizabeth has not had a close contact with a laboratory-confirmed COVID-19 patient within 14 days of symptom onset.   Since December 2019, Elizabeth and has not had upper respiratory infection or influenza-like illness. Has not been diagnosed with lab-confirmed COVID-19 test   Pertinent medical history  Elizabeth had 2 sinus infections within the past year.   Elizabeth does not get yeast infections when she takes antibiotics.   Elizabeth does not need a return to work/school note.   Weight: 130 lbs   Elizabeth does not smoke or use smokeless tobacco.   Additional information as reported by the patient (free text): In all of the years i have dealt with sinus infections, the last time I had one I was prescribed doxycycline and it was amazing how it worked so much better than other antibiotics that have been prescribed to me previously for a sinus infection.   Weight: 130 lbs    MEDICATIONS: Lipitor oral, metoprolol tartrate oral, ALLERGIES: amoxicillin, amoxicillin  Clinician Response:  Dear Elizabeth,  Based on the information provided, you have acute bacterial sinusitis, also known as a sinus infection. Sinus infections are caused by bacteria or a virus and symptoms are almost always identical. The difference between the 2 types of infections is timing.  Sinus infections start as viral infections and symptoms improve on their own in about 7 days. If symptoms have not improved after 7 days or have even worsened, a bacterial infection may have developed.  Medication information  I am prescribing:       Fluticasone 50 mcg/actuation nasal spray. Inhale 2 sprays in each nostril 1 time per day; after 1 week, may adjust to 1 - 2 sprays in each nostril 1 time per day. This medication takes several days to start working, so keep taking it even if it doesn't help right away. There are no refills with this prescription.      Doxycycline hyclate 100 mg oral tablet. Take 1 tablet by mouth 2 times per day for 7 days. There are no refills with this  prescription.     Certain antibiotics such as Doxycycline, levofloxacin, and moxifloxacin can cause your skin to be more sensitive to the sun. Exposure to the sun when taking these antibiotics may cause skin rash, itching, redness, or a severe sunburn. Be sure to avoid prolonged or direct exposure to the sun, especially between 10 am and 3 pm, if possible. Wear protective clothing and use sunscreen of SPF 30 or higher when you are outdoors.  Yeast infections can be a common side effect of antibiotics. The most common symptom of a yeast infection is itchiness in and around the vagina. Other signs and symptoms include burning, redness, or a thick, white vaginal discharge that looks like cottage cheese and does not have a bad smell.  Self care  Steps you can take to be as comfortable as possible:     Rest.    Drink plenty of fluids.    Take a warm shower to loosen congestion    Use a cool-mist humidifier.     When to seek care  Please be seen in a clinic or urgent care if any of the following occur:     New symptoms develop, or symptoms become worse    Symptoms do not start to improve after 3 days of treatment     Call ahead before going to the clinic or urgent care.  It is possible to have an allergic reaction to an antibiotic even if you have not had one in the past. If you notice a new rash, significant swelling, or difficulty breathing, stop taking this medication immediately and go to a clinic or urgent care.  COVID-19 (Coronavirus) General Information  Because there is currently no vaccine to prevent infection, the best way to protect yourself is to avoid being exposed to this virus. Common symptoms of COVID-19 include but are not limited to fever, cough, and shortness of breath. These symptoms appear 2-14 days after you are exposed to the virus that causes COVID-19. Click here for more information from the CDC on how to protect yourself.  If you are sick with COVID-19 or suspect you are infected with the virus  that causes COVID-19, follow the steps here from the CDC to help prevent the disease from spreading to people in your home and community.  Click here for general information from the CDC on testing.  If you develop any of these emergency warning signs for COVID-19, get medical attention immediately:     Trouble breathing    Persistent pain or pressure in the chest    New confusion or inability to arouse    Bluish lips or face      Call your doctor or clinic before going in. Call 911 if you have a medical emergency and notify the  you have or think you may have COVID-19.  For more detailed and up to date information on COVID-19 (Coronavirus), please visit the CDC website.   Diagnosis: Acute bacterial sinusitis  Diagnosis ICD: J01.90  Additional Clinician Notes:   I would also consider getting tested for covid if symptoms do not improve over next few days.&nbsp;   Prescription: doxycycline hyclate 100 mg oral tablet 14 tablet, 7 days supply. Take 1 tablet by mouth 2 times per day for 7 days. Refills: 0, Refill as needed: no, Allow substitutions: yes  Prescription: fluticasone 50 mcg/actuation nasal spray,suspension 1 120 spray aerosol with adapter (grams), 30 days supply. Inhale 2 sprays in each nostril 1 time per day; after 1 week, may adjust to 1 - 2 sprays in each nostril 1 time per day.. Refills: 0, Refill as needed: no, Allow substitutions: yes  Pharmacy: [id number] - [phone] - [address] [address]

## 2020-09-03 ENCOUNTER — E-VISIT (OUTPATIENT)
Dept: FAMILY MEDICINE | Facility: CLINIC | Age: 55
End: 2020-09-03
Payer: COMMERCIAL

## 2020-09-03 DIAGNOSIS — J01.90 ACUTE SINUSITIS WITH SYMPTOMS > 10 DAYS: Primary | ICD-10-CM

## 2020-09-03 PROCEDURE — 99421 OL DIG E/M SVC 5-10 MIN: CPT | Performed by: NURSE PRACTITIONER

## 2020-09-03 RX ORDER — AZITHROMYCIN 250 MG/1
TABLET, FILM COATED ORAL
Qty: 6 TABLET | Refills: 0 | Status: SHIPPED | OUTPATIENT
Start: 2020-09-03 | End: 2020-09-08

## 2020-09-03 NOTE — PATIENT INSTRUCTIONS
Thank you for choosing us for your care. I have placed an order for a prescription so that you can start treatment. View your full visit summary for details by clicking on the link below. Your pharmacist will able to address any questions you may have about the medication.     If you're not feeling better within 5-7 days, please schedule an appointment.  You can schedule an appointment right here in [facility], or call [phone]  If the visit is for the same symptoms as your e-visit, we'll refund the cost of your e-visit if seen within seven days.    You may want to try a nasal lavage (also known as nasal irrigation). You can find over-the-counter products, such as Neti-Pot, at retail locations or make your own at home. Instructions for homemade nasal lavage and more information on the process are available online at http://www.aafp.org/afp/2009/1115/p1121.html.      Sinusitis (Antibiotic Treatment)    The sinuses are air-filled spaces within the bones of the face. They connect to the inside of the nose. Sinusitis is an inflammation of the tissue that lines the sinuses. Sinusitis can occur during a cold. It can also happen due to allergies to pollens and other particles in the air. Sinusitis can cause symptoms of sinus congestion and a feeling of fullness. A sinus infection causes fever, headache, and facial pain. There is often green or yellow fluid draining from the nose or into the back of the throat (post-nasal drip). You have been given antibiotics to treat this condition.  Home care    Take the full course of antibiotics as instructed. Do not stop taking them, even when you feel better.    Drink plenty of water, hot tea, and other liquids. This may help thin nasal mucus. It also may help your sinuses drain fluids.    Heat may help soothe painful areas of your face. Use a towel soaked in hot water. Or,  the shower and direct the warm spray onto your face. Using a vaporizer along with a menthol rub at  night may also help soothe symptoms.     An expectorant with guaifenesin may help thin nasal mucus and help your sinuses drain fluids.    You can use an over-the-counter decongestant, unless a similar medicine was prescribed to you. Nasal sprays work the fastest. Use one that contains phenylephrine or oxymetazoline. First blow your nose gently. Then use the spray. Do not use these medicines more often than directed on the label. If you do, your symptoms may get worse. You may also take pills that contain pseudoephedrine. Don t use products that combine multiple medicines. This is because side effects may be increased. Read labels. You can also ask the pharmacist for help. (People with high blood pressure should not use decongestants. They can raise blood pressure.)    Over-the-counter antihistamines may help if allergies contributed to your sinusitis.      Do not use nasal rinses or irrigation during an acute sinus infection, unless your healthcare provider tells you to. Rinsing may spread the infection to other areas in your sinuses.    Use acetaminophen or ibuprofen to control pain, unless another pain medicine was prescribed to you. If you have chronic liver or kidney disease or ever had a stomach ulcer, talk with your healthcare provider before using these medicines. (Aspirin should never be taken by anyone under age 18 who is ill with a fever. It may cause severe liver damage.)    Don't smoke. This can make symptoms worse.  Follow-up care  Follow up with your healthcare provider or our staff if you are not better in 1 week.  When to seek medical advice  Call your healthcare provider if any of these occur:    Facial pain or headache that gets worse    Stiff neck    Unusual drowsiness or confusion    Swelling of your forehead or eyelids    Vision problems, such as blurred or double vision    Fever of 100.4 F (38 C) or higher, or as directed by your healthcare provider    Seizure    Breathing problems    Symptoms  don't go away in 10 days  Prevention  Here are steps you can take to help prevent an infection:    Keep good hand washing habits.    Don t have close contact with people who have sore throats, colds, or other upper respiratory infections.    Don t smoke, and stay away from secondhand smoke.    Stay up to date with of your vaccines.  Date Last Reviewed: 11/1/2017 2000-2019 [employer] [address] [place] [address], [place] [id number]. All rights reserved. This information is not intended as a substitute for professional medical care. Always follow your healthcare professional's instructions.

## 2020-09-17 ENCOUNTER — TELEPHONE (OUTPATIENT)
Dept: FAMILY MEDICINE | Facility: CLINIC | Age: 55
End: 2020-09-17

## 2020-09-17 NOTE — LETTER
[facility]  [address] [address]  [phone]    September 17, 2020    Elizabeth Santizo  [address] [place] [address]          Dear Elizabeth,    --Mammogram screening is generally recommended every year starting at age of 50.  Some women may choose to be screened at an earlier age ( between 40 & 50) depending on risk factors and discussions with her primary provider.  Your last mammogram was November 2018.   Call [phone] to schedule.  --Colon cancer screening is generally recommended in all patients over the age of 50 years of age. This can be with either colonoscopy every 10 years, or testing the stool for blood one time per year (called a FIT test, a simple card you can send back to us in the mail). On review of our electronic medical record it appears you are due for colon cancer screening. Please call the clinic to assist in setting up a colonoscopy or, if you prefer, setting up the test for stool blood(FIT).   If you need another FIT kit mailed to you, please let us know.  Or if you prefer to do a Colonoscopy this year.    If you have had any of these tests at an outside facility, please let us know so that we can update your record.     Please disregard this notice if you have already scheduled an appointment.     Sincerely,    Ilana JEROME

## 2020-09-17 NOTE — TELEPHONE ENCOUNTER
Panel Management Review        Composite cancer screening  Chart review shows that this patient is due/due soon for the following Mammogram and Colonoscopy  Summary:    Patient is due/failing the following:   COLONOSCOPY and MAMMOGRAM    Action needed:   Patient was given FIT kit in March  Also mammogram was ordered  Needs to complete tests ordered.    Type of outreach:    Sent letter.    Questions for provider review:    None                                                                                                                                    SREEKANTH RIVERS

## 2020-11-06 ENCOUNTER — E-VISIT (OUTPATIENT)
Dept: FAMILY MEDICINE | Facility: CLINIC | Age: 55
End: 2020-11-06
Payer: COMMERCIAL

## 2020-11-06 DIAGNOSIS — J01.90 ACUTE SINUSITIS WITH SYMPTOMS > 10 DAYS: Primary | ICD-10-CM

## 2020-11-06 DIAGNOSIS — J30.2 SEASONAL ALLERGIC RHINITIS, UNSPECIFIED TRIGGER: ICD-10-CM

## 2020-11-06 PROCEDURE — 99421 OL DIG E/M SVC 5-10 MIN: CPT | Mod: 95 | Performed by: NURSE PRACTITIONER

## 2020-11-06 RX ORDER — FLUTICASONE PROPIONATE 50 MCG
1 SPRAY, SUSPENSION (ML) NASAL DAILY
Qty: 16 G | Refills: 11 | Status: SHIPPED | OUTPATIENT
Start: 2020-11-06 | End: 2021-04-23

## 2020-11-06 RX ORDER — DOXYCYCLINE HYCLATE 100 MG
100 TABLET ORAL 2 TIMES DAILY
Qty: 20 TABLET | Refills: 0 | Status: SHIPPED | OUTPATIENT
Start: 2020-11-06 | End: 2021-04-23

## 2020-11-06 NOTE — PATIENT INSTRUCTIONS
Thank you for choosing us for your care. I have placed an order for a prescription so that you can start treatment. View your full visit summary for details by clicking on the link below. Your pharmacist will able to address any questions you may have about the medication.     If you're not feeling better within 5-7 days, please schedule an appointment.  You can schedule an appointment right here in [facility], or call [phone]  If the visit is for the same symptoms as your e-visit, we'll refund the cost of your e-visit if seen within seven days.      Sinusitis (Antibiotic Treatment)    The sinuses are air-filled spaces within the bones of the face. They connect to the inside of the nose. Sinusitis is an inflammation of the tissue that lines the sinuses. Sinusitis can occur during a cold. It can also happen due to allergies to pollens and other particles in the air. Sinusitis can cause symptoms of sinus congestion and a feeling of fullness. A sinus infection causes fever, headache, and facial pain. There is often green or yellow fluid draining from the nose or into the back of the throat (post-nasal drip). You have been given antibiotics to treat this condition.   Home care    Take the full course of antibiotics as instructed. Don't stop taking them, even when you feel better.    Drink plenty of water, hot tea, and other liquids as directed by the healthcare provider. This may help thin nasal mucus. It also may help your sinuses drain fluids.    Heat may help soothe painful areas of your face. Use a towel soaked in hot water. Or,  the shower and direct the warm spray onto your face. Using a vaporizer along with a menthol rub at night may also help soothe symptoms.     An expectorant with guaifenesin may help thin nasal mucus and help your sinuses drain fluids. Talk with your provider or pharmacists before taking an over-the-counter (OTC) medicine if you have any questions about it or its side  effects..    You can use an OTC decongestant, unless a similar medicine was prescribed to you. Nasal sprays work the fastest. Use one that contains phenylephrine or oxymetazoline. First blow your nose gently. Then use the spray. Don't use these medicines more often than directed on the label. If you do, your symptoms may get worse. You may also take pills that contain pseudoephedrine. Don t use products that combine multiple medicines. This is because side effects may be increased. Read labels. You can also ask the pharmacist for help. (People with high blood pressure should not use decongestants. They can raise blood pressure.) Talk with your provider or pharmacist if you have any questions about the medicine..    OTC antihistamines may help if allergies contributed to your sinusitis. Talk with your provider or pharmacist if you have any questions about the medicine..    Don't use nasal rinses or irrigation during an acute sinus infection, unless your healthcare provider tells you to. Rinsing may spread the infection to other areas in your sinuses.    Use acetaminophen or ibuprofen to control pain, unless another pain medicine was prescribed to you. If you have chronic liver or kidney disease or ever had a stomach ulcer, talk with your healthcare provider before using these medicines. Never give aspirin to anyone under age 18 who is ill with a fever. It may cause severe liver damage.    Don't smoke. This can make symptoms worse.    Follow-up care  Follow up with your healthcare provider, or as advised.   When to seek medical advice  Call your healthcare provider if any of these occur:     Facial pain or headache that gets worse    Stiff neck    Unusual drowsiness or confusion    Swelling of your forehead or eyelids    Symptoms don't go away in 10 days    Vision problems, such as blurred or double vision    Fever of 100.4 F (38 C) or higher, or as directed by your healthcare provider  Call 911  Call 911 if any of  these occur:     Seizure    Trouble breathing    Feeling dizzy or faint    Fingernails, skin or lips look blue, purple , or gray  Prevention  Here are steps you can take to help prevent an infection:     Keep good hand washing habits.    Don t have close contact with people who have sore throats, colds, or other upper respiratory infections.    Don t smoke, and stay away from secondhand smoke.    Stay up to date with of your vaccines.  [employer] last reviewed this educational content on 12/1/2019 2000-2020 [employer]. [address] [place] [address], [place] [address]. All rights reserved. This information is not intended as a substitute for professional medical care. Always follow your healthcare professional's instructions.

## 2020-11-27 DIAGNOSIS — K21.9 GASTROESOPHAGEAL REFLUX DISEASE WITHOUT ESOPHAGITIS: ICD-10-CM

## 2020-12-07 ENCOUNTER — VIRTUAL VISIT (OUTPATIENT)
Dept: FAMILY MEDICINE | Facility: OTHER | Age: 55
End: 2020-12-07

## 2020-12-07 NOTE — PROGRESS NOTES
"Date: 2020 13:26:26  Clinician: Ivan Torres  Clinician NPI: [id number]  Patient: Elizabeth Santizo  Patient : 1965  Patient Address: [address] [place] [id number]  Patient Phone: [phone]  Visit Protocol: URI  Patient Summary:  Elizabeth is a 55 year old ( : 1965 ) female who initiated a [facility] Visit for COVID-19 (Coronavirus) evaluation and screening. When asked the question \"Please sign me up to receive news, health information and promotions from [facility].\", Elizabeth responded \"No\".    Elizabeth states her symptoms started gradually 3-4 days ago.   Her symptoms consist of a headache, nasal congestion, nausea, chills, malaise, and a sore throat.   Symptom details     Nasal secretions: The color of her mucus is clear.    Sore throat: Elizabeth reports having mild throat pain (1-3 on a 10 point pain scale), does not have exudate on her tonsils, and can swallow liquids. The lymph nodes in her neck are not enlarged. A rash has not appeared on the skin since the sore throat started.     Headache: She states the headache is moderate (4-6 on a 10 point pain scale).      Elizabeth denies having ear pain, wheezing, fever, enlarged lymph nodes, cough, vomiting, rhinitis, facial pain or pressure, myalgias, teeth pain, ageusia, diarrhea, and anosmia. She also denies taking antibiotic medication in the past month, having recent facial or sinus surgery in the past 60 days, and double sickening (worsening symptoms after initial improvement). She is not experiencing dyspnea.   Precipitating events  Within the past week, Elizabeth has not been exposed to someone with strep throat. She has not recently been exposed to someone with influenza. Elizabeth has not been in close contact with any high risk individuals.   Pertinent COVID-19 (Coronavirus) information  Elizabeth does not work or volunteer as healthcare worker or a . In the past 14 days, Elizabeth has not worked or volunteered at a healthcare facility or group living " setting.   In the past 14 days, she also has not lived in a congregate living setting.   Elizabeth has not had a close contact with a laboratory-confirmed COVID-19 patient within 14 days of symptom onset.    Since December 2019, Elizabeth has been tested for COVID-19 and has not had upper respiratory infection or influenza-like illness.      Result of COVID-19 test: Negative     Date of her COVID-19 test: 07/03/2020      Pertinent medical history  She has not been told by her provider to avoid NSAIDs.   Elizabeth does not get yeast infections when she takes antibiotics.   Elizabeth does not have diabetes. She denies having immunosuppressive conditions (e.g., chemotherapy, HIV, organ transplant, long-term use of steroids or other immunosuppressive medications, splenectomy). She does not have severe COPD and congestive heart failure. She does not have asthma.   Elizabeth needs a return to work/school note.   Weight: 130 lbs   Elizabeth does not smoke or use smokeless tobacco.   Additional information as reported by the patient (free text): My work is requiring a covid test before I can return to work due to my symptoms   Weight: 130 lbs    MEDICATIONS: Lipitor oral, metoprolol tartrate oral, ALLERGIES: amoxicillin, amoxicillin  Clinician Response:  Dear Elizabeth,   Your symptoms show that you may have coronavirus (COVID-19). This illness can cause fever, cough and trouble breathing. Many people get a mild case and get better on their own. Some people can get very sick.  What should I do?  We would like to test you for this virus.   1. Please call [phone] to schedule your visit. Explain that you were referred by [facility] to have a COVID-19 test. Be ready to share your [facility] visit ID number.  * If you need to schedule in [facility] please call [phone] or for [employer] employees please call [phone].  * If you need to schedule in the [place] area please call [phone]. [employer] employees call [phone].  The following will serve as  "your written order for this COVID Test, ordered by me, for the indication of suspected COVID [Z20.828]: The test will be ordered in [facility], our electronic health record, after you are scheduled. It will show as ordered and authorized by Alcides Hendrix MD.  Order: COVID-19 (Coronavirus) PCR for [facility]TOMATIC [facility] from [facility].   2. When it's time for your COVID test:  Stay at least 6 feet away from others. (If someone will drive you to your test, stay in the backseat, as far away from the  as you can.)   Cover your mouth and nose with a mask, tissue or washcloth.  Go straight to the testing site. Don't make any stops on the way there or back.      3.Starting now: Stay home and away from others (self-isolate) until:   You've had no fever---and no medicine that reduces fever---for one full day (24 hours). And...   Your other symptoms have gotten better. For example, your cough or breathing has improved. And...   At least 10 days have passed since your symptoms started.       During this time, don't leave the house except for testing or medical care.   Stay in your own room, even for meals. Use your own bathroom if you can.   Stay away from others in your home. No hugging, kissing or shaking hands. No visitors.  Don't go to work, school or anywhere else.    Clean \"high touch\" surfaces often (doorknobs, counters, handles, etc.). Use a household cleaning spray or wipes. You'll find a full list of  on the EPA website: www.epa.gov/pesticide-registration/list-n-disinfectants-use-against-sars-cov-2.   Cover your mouth and nose with a mask, tissue or washcloth to avoid spreading germs.  Wash your hands and face often. Use soap and water.  Caregivers in these groups are at risk for severe illness due to COVID-19:  o People 65 years and older  o People who live in a nursing home or long-term care facility  o People with chronic disease (lung, heart, cancer, diabetes, kidney, liver, immunologic)  o People who have a " weakened immune system, including those who:   Are in cancer treatment  Take medicine that weakens the immune system, such as corticosteroids  Had a bone marrow or organ transplant  Have an immune deficiency  Have poorly controlled HIV or AIDS  Are obese (body mass index of 40 or higher)  Smoke regularly   o Caregivers should wear gloves while washing dishes, handling laundry and cleaning bedrooms and bathrooms.  o Use caution when washing and drying laundry: Don't shake dirty laundry, and use the warmest water setting that you can.  o For more tips, go to www.cdc.gov/coronavirus/2019-ncov/downloads/10Things.pdf.    How can I take care of myself?    Get lots of rest. Drink extra fluids (unless a doctor has told you not to).   Take Tylenol (acetaminophen) for fever or pain. If you have liver or kidney problems, ask your family doctor if it's okay to take Tylenol.   Adults can take either:    650 mg (two 325 mg pills) every 4 to 6 hours, or...   1,000 mg (two 500 mg pills) every 8 hours as needed.    Note: Don't take more than 3,000 mg in one day. Acetaminophen is found in many medicines (both prescribed and over-the-counter medicines). Read all labels to be sure you don't take too much.   For children, check the Tylenol bottle for the right dose. The dose is based on the child's age or weight.    If you have other health problems (like cancer, heart failure, an organ transplant or severe kidney disease): Call your specialty clinic if you don't feel better in the next 2 days.       Know when to call 911. Emergency warning signs include:    Trouble breathing or shortness of breath Pain or pressure in the chest that doesn't go away Feeling confused like you haven't felt before, or not being able to wake up Bluish-colored lips or face.  Where can I get more information?    [facility] -- About COVID-19: [email or web address]   CDC -- What to Do If You're Sick:  www.cdc.gov/coronavirus/2019-ncov/about/steps-when-sick.html   CDC -- Ending Home Isolation: www.cdc.gov/coronavirus/2019-ncov/hcp/disposition-in-home-patients.html   [facility] -- Caring for Someone: www.cdc.gov/coronavirus/2019-ncov/if-you-are-sick/care-for-someone.html   [facility] -- Interim Guidance for Hospital Discharge to Home: www.[facility].mn./diseases/coronavirus/hcp/hospdischarge.pdf   [facility] clinical trials (COVID-19 research studies): clinicalaffairs.[facility]-clinical-trials    Below are the COVID-19 hotlines at the [place] [facility] ([facility]). Interpreters are available.    For health questions: Call 915-560-8268 or 1-330.307.3692 (7 a.m. to 7 p.m.) For questions about schools and childcare: Call 571-728-5763 or 1-292.379.8642 (7 a.m. to 7 p.m.)    Diagnosis: Contact with and (suspected) exposure to other viral communicable diseases  Diagnosis ICD: Z20.828

## 2020-12-08 DIAGNOSIS — Z20.822 ENCOUNTER FOR LABORATORY TESTING FOR COVID-19 VIRUS: Primary | ICD-10-CM

## 2020-12-08 PROCEDURE — U0003 INFECTIOUS AGENT DETECTION BY NUCLEIC ACID (DNA OR RNA); SEVERE ACUTE RESPIRATORY SYNDROME CORONAVIRUS 2 (SARS-COV-2) (CORONAVIRUS DISEASE [COVID-19]), AMPLIFIED PROBE TECHNIQUE, MAKING USE OF HIGH THROUGHPUT TECHNOLOGIES AS DESCRIBED BY CMS-2020-01-R: HCPCS | Performed by: FAMILY MEDICINE

## 2020-12-09 LAB
SARS-COV-2 RNA SPEC QL NAA+PROBE: NOT DETECTED
SPECIMEN SOURCE: NORMAL

## 2020-12-20 ENCOUNTER — HEALTH MAINTENANCE LETTER (OUTPATIENT)
Age: 55
End: 2020-12-20

## 2021-02-27 ENCOUNTER — VIRTUAL VISIT (OUTPATIENT)
Dept: FAMILY MEDICINE | Facility: OTHER | Age: 56
End: 2021-02-27
Payer: COMMERCIAL

## 2021-02-27 PROCEDURE — 99421 OL DIG E/M SVC 5-10 MIN: CPT | Performed by: NURSE PRACTITIONER

## 2021-02-27 NOTE — PROGRESS NOTES
"Date: 2021 15:53:35  Clinician: Nicki Rojas  Clinician NPI: [id number]  Patient: Elizabeth Santizo  Patient : 1965  Patient Address: [address] [place], [place] [id number]  Patient Phone: [phone]  Visit Protocol: URI  Patient Summary:  Elizabeth is a 55 year old ( : 1965 ) female who initiated a [facility] Visit for cold, sinus infection, or influenza. When asked the question \"Please sign me up to receive news, health information and promotions from [facility].\", Elizabeth responded \"Yes\".    Elizabeth states her symptoms started gradually 10-13 days ago.   Her symptoms consist of a headache, chills, myalgia, rhinitis, malaise, nasal congestion, facial pain or pressure, and tooth pain.   Symptom details     Nasal secretions: The color of her mucus is yellow.    Facial pain or pressure: The facial pain or pressure feels worse when bending over or leaning forward.     Headache: She states the headache is moderate (4-6 on a 10 point pain scale).     Tooth pain: The tooth pain is not caused by a cavity, recent dental work, or other mouth problems.      Elizabeth denies having ear pain, vomiting, fever, cough, nausea, anosmia, wheezing, diarrhea, sore throat, and ageusia. She also denies having recent facial or sinus surgery in the past 60 days, taking antibiotic medication in the past month, and double sickening (worsening symptoms after initial improvement). She is not experiencing dyspnea.   Precipitating events  She has not recently been exposed to someone with influenza. Elizabeth has not been in close contact with any high risk individuals.   Pertinent COVID-19 (Coronavirus) information  Elizabeth does not work or volunteer as healthcare worker or a . In the past 14 days, Elizabeth has not worked or volunteered at a healthcare facility or group living setting.   In the past 14 days, she also has not lived in a congregate living setting.   Elizabeth has not had a close contact with a laboratory-confirmed COVID-19 " patient within 14 days of symptom onset.    Elizabeth has been tested for COVID-19.      Date(s) of her COVID-19 test as reported by the patient (free text): 11/17/20       Result of COVID-19 test as reported by the patient (free text): Negative       Type of test as reported by the patient (free text): Nasal        Elizabeth has not received a COVID-19 vaccine.   Triage Point(s) temporarily suspended for COVID-19 (Coronavirus) screening  Elizabeth reported the following symptoms/conditions. These are protocol referral points that have temporarily been removed for purposes of COVID-19 (Coronavirus) screening.   More than 4 sinus infections in the past year   Pertinent medical history  Elizabeth had 4 or more sinus infections within the past year.   She has not been told by her provider to avoid NSAIDs.   Elizabeth does not get yeast infections when she takes antibiotics.   Elizabeth does not have diabetes. She denies having immunosuppressive conditions (e.g., chemotherapy, HIV, organ transplant, long-term use of steroids or other immunosuppressive medications, splenectomy). She denies having severe COPD and congestive heart failure. She does not have asthma.   Elizabeth does not need a return to work/school note.   Elizabeth does not smoke or use smokeless tobacco.   Weight: 130 lbs    MEDICATIONS: Lipitor oral, metoprolol tartrate oral, ALLERGIES: amoxicillin, amoxicillin  Clinician Response:  Dear Elizabeth,  Based on the information provided, you have acute bacterial sinusitis, also known as a sinus infection. Sinus infections are caused by bacteria or a virus and symptoms are almost always identical. The difference between the 2 types of infections is timing.  Sinus infections start as viral infections and symptoms improve on their own in about 7 days. If symptoms have not improved after 7 days or have even worsened, a bacterial infection may have developed.  Medication information  I am prescribing:       Fluticasone 50 mcg/actuation nasal spray. Inhale 2  sprays in each nostril 1 time per day; after 1 week, may adjust to 1 - 2 sprays in each nostril 1 time per day. This medication takes several days to start working, so keep taking it even if it doesn't help right away. There are no refills with this prescription.      Doxycycline hyclate 100 mg oral tablet. Take 1 tablet by mouth 2 times per day for 7 days. There are no refills with this prescription.     Certain antibiotics such as Doxycycline, levofloxacin, and moxifloxacin can cause your skin to be more sensitive to the sun. Exposure to the sun when taking these antibiotics may cause skin rash, itching, redness, or a severe sunburn. Be sure to avoid prolonged or direct exposure to the sun, especially between 10 am and 3 pm, if possible. Wear protective clothing and use sunscreen of SPF 30 or higher when you are outdoors.  Yeast infections can be a common side effect of antibiotics. The most common symptom of a yeast infection is itchiness in and around the vagina. Other signs and symptoms include burning, redness, or a thick, white vaginal discharge that looks like cottage cheese and does not have a bad smell.  Unless you are allergic to the over-the-counter medication(s) below, I recommend using:       Acetaminophen (Tylenol or store brand) oral tablet. Take 1-2 tablets by mouth every 4-6 hours to help with the discomfort.      Ibuprofen (Advil or store brand) 200 mg oral tablet. Take 1-3 tablets (200-600 mg) by mouth every 8 hours to help with the discomfort. Make sure to take the ibuprofen with food. Do not exceed 2400 mg in 24 hours.    A decongestant such as Sudafed PE or store brand.     Over-the-counter medications do not require a prescription. Ask the pharmacist if you have any questions.  Self care  Steps you can take to be as comfortable as possible:     Rest.    Drink plenty of fluids.    Take a warm shower to loosen congestion    Use a cool-mist humidifier.     When to seek care  Please be seen in a  clinic or urgent care if any of the following occur:     New symptoms develop, or symptoms become worse    Symptoms do not start to improve after 3 days of treatment     Call ahead before going to the clinic or urgent care.  It is possible to have an allergic reaction to an antibiotic even if you have not had one in the past. If you notice a new rash, significant swelling, or difficulty breathing, stop taking this medication immediately and go to a clinic or urgent care.  For the latest updates on COVID-19 (Coronavirus), please visit the Centers for Disease Control and Prevention (CDC).   Diagnosis: Acute bacterial sinusitis  Diagnosis ICD: J01.90  Prescription: doxycycline hyclate 100 mg oral tablet 14 tablet, 7 days supply. Take 1 tablet by mouth 2 times per day for 7 days. Refills: 0, Refill as needed: no, Allow substitutions: yes  Prescription: fluticasone 50 mcg/actuation nasal spray,suspension 1 120 spray aerosol with adapter (grams), 30 days supply. Inhale 2 sprays in each nostril 1 time per day; after 1 week, may adjust to 1 - 2 sprays in each nostril 1 time per day.. Refills: 0, Refill as needed: no, Allow substitutions: yes  Pharmacy: [id number] - [phone] - [address] [address]

## 2021-03-03 ENCOUNTER — TELEPHONE (OUTPATIENT)
Dept: FAMILY MEDICINE | Facility: CLINIC | Age: 56
End: 2021-03-03

## 2021-03-03 NOTE — TELEPHONE ENCOUNTER
Patient Quality Outreach Summary      Summary:    Patient is due/failing the following:   Colonoscopy and Breast Cancer Screening - Mammogram    Type of outreach:    Sent [employer] message.    Questions for provider review:    None                                                                                                                    SREEKANTH RIVERS

## 2021-03-03 NOTE — LETTER
[facility]  [address] [facility] [id number] [phone]    March 3, 2021    Elizabeth Santizo  [address] [facility] [address]          Dear Elizabeth,    --Mammogram screening is generally recommended every year starting at age of 50.  Some women may choose to be screened at an earlier age ( between 40 & 50) depending on risk factors and discussions with her primary provider. Call [phone] to schedule a mammogram.   --Colon cancer screening is generally recommended in all patients over the age of 50 years of age. This can be with either colonoscopy every 10 years, or testing the stool for blood one time per year (called a FIT test, a simple card you can send back to us in the mail). On review of our electronic medical record it appears you are due for colon cancer screening. Please call the clinic to assist in setting up a colonoscopy or, if you prefer, setting up the test for stool blood(FIT).      Sincerely,    Ilana JEROME

## 2021-03-17 DIAGNOSIS — F41.1 GAD (GENERALIZED ANXIETY DISORDER): ICD-10-CM

## 2021-03-17 DIAGNOSIS — E78.5 HYPERLIPIDEMIA WITH TARGET LDL LESS THAN 130: ICD-10-CM

## 2021-03-17 DIAGNOSIS — I10 ESSENTIAL HYPERTENSION WITH GOAL BLOOD PRESSURE LESS THAN 140/90: ICD-10-CM

## 2021-03-17 DIAGNOSIS — F33.1 MAJOR DEPRESSIVE DISORDER, RECURRENT EPISODE, MODERATE (H): ICD-10-CM

## 2021-03-18 RX ORDER — ATORVASTATIN CALCIUM 40 MG/1
TABLET, FILM COATED ORAL
Qty: 30 TABLET | Refills: 0 | Status: SHIPPED | OUTPATIENT
Start: 2021-03-18 | End: 2021-04-22

## 2021-03-18 RX ORDER — TRAZODONE HYDROCHLORIDE 50 MG/1
TABLET, FILM COATED ORAL
Qty: 30 TABLET | Refills: 0 | Status: SHIPPED | OUTPATIENT
Start: 2021-03-18 | End: 2021-04-23

## 2021-03-18 RX ORDER — METOPROLOL SUCCINATE 50 MG/1
TABLET, EXTENDED RELEASE ORAL
Qty: 30 TABLET | Refills: 0 | Status: SHIPPED | OUTPATIENT
Start: 2021-03-18 | End: 2021-04-23

## 2021-03-18 NOTE — TELEPHONE ENCOUNTER
"Requested Prescriptions   Pending Prescriptions Disp Refills     traZODone (DESYREL) 50 MG tablet [Pharmacy Med Name: traZODone HCl 50 MG Oral Tablet] 90 tablet 0     Sig: TAKE 1/2 TO 1 (ONE-HALF TO ONE) TABLET BY MOUTH NIGHTLY AS NEEDED FOR SLEEP       Serotonin Modulators Passed - 3/17/2021  8:28 PM        Passed - Recent (12 mo) or future (30 days) visit within the authorizing provider's specialty     Patient has had an office visit with the authorizing provider or a provider within the authorizing providers department within the previous 12 mos or has a future within next 30 days. See \"Patient Info\" tab in inbasket, or \"Choose Columns\" in Meds & Orders section of the refill encounter.              Passed - Medication is active on med list        Passed - Patient is age 18 or older        Passed - No active pregnancy on record        Passed - No positive pregnancy test in past 12 months           atorvastatin (LIPITOR) 40 MG tablet [Pharmacy Med Name: Atorvastatin Calcium 40 MG Oral Tablet] 90 tablet 0     Sig: Take 1 tablet by mouth once daily       Statins Protocol Failed - 3/17/2021  8:28 PM        Failed - LDL on file in past 12 months     Recent Labs   Lab Test 03/06/20  0827   LDL 92             Passed - No abnormal creatine kinase in past 12 months     No lab results found.             Passed - Recent (12 mo) or future (30 days) visit within the authorizing provider's specialty     Patient has had an office visit with the authorizing provider or a provider within the authorizing providers department within the previous 12 mos or has a future within next 30 days. See \"Patient Info\" tab in inbasket, or \"Choose Columns\" in Meds & Orders section of the refill encounter.              Passed - Medication is active on med list        Passed - Patient is age 18 or older        Passed - No active pregnancy on record        Passed - No positive pregnancy test in past 12 months           metoprolol succinate ER " "(TOPROL-XL) 50 MG 24 hr tablet [Pharmacy Med Name: Metoprolol Succinate ER 50 MG Oral Tablet Extended Release 24 Hour] 90 tablet 0     Sig: Take 1 tablet by mouth once daily       Beta-Blockers Protocol Failed - 3/17/2021  8:28 PM        Failed - Blood pressure under 140/90 in past 12 months     BP Readings from Last 3 Encounters:   03/06/20 120/82   05/14/19 134/82   01/17/19 116/84                 Passed - Patient is age 6 or older        Passed - Recent (12 mo) or future (30 days) visit within the authorizing provider's specialty     Patient has had an office visit with the authorizing provider or a provider within the authorizing providers department within the previous 12 mos or has a future within next 30 days. See \"Patient Info\" tab in inbasket, or \"Choose Columns\" in Meds & Orders section of the refill encounter.              Passed - Medication is active on med list             "

## 2021-03-18 NOTE — TELEPHONE ENCOUNTER
Routing refill requests to provider for review/approval because:  Patient needs to be seen because it has been more than 1 year since last office visit.    Magdalena LAROSN RN, BSN

## 2021-03-24 ENCOUNTER — MYC MEDICAL ADVICE (OUTPATIENT)
Dept: FAMILY MEDICINE | Facility: CLINIC | Age: 56
End: 2021-03-24

## 2021-04-18 ENCOUNTER — HEALTH MAINTENANCE LETTER (OUTPATIENT)
Age: 56
End: 2021-04-18

## 2021-04-22 ASSESSMENT — ENCOUNTER SYMPTOMS
HEADACHES: 1
COUGH: 0
PARESTHESIAS: 0
BREAST MASS: 0
JOINT SWELLING: 0
NAUSEA: 0
HEARTBURN: 0
FREQUENCY: 0
NERVOUS/ANXIOUS: 0
WEAKNESS: 0
ABDOMINAL PAIN: 0
DIARRHEA: 0
HEMATURIA: 0
EYE PAIN: 0
CONSTIPATION: 0
CHILLS: 0
SORE THROAT: 0
DYSURIA: 0
ARTHRALGIAS: 1
MYALGIAS: 0
HEMATOCHEZIA: 0
DIZZINESS: 0
FEVER: 0
PALPITATIONS: 0
SHORTNESS OF BREATH: 0

## 2021-04-22 NOTE — PROGRESS NOTES
SUBJECTIVE:   CC: Elizabeth Santizo is an 56 year old woman who presents for preventive health visit.       Patient has been advised of split billing requirements and indicates understanding: Yes  Healthy Habits:     Getting at least 3 servings of Calcium per day:  Yes    Bi-annual eye exam:  Yes    Dental care twice a year:  Yes    Sleep apnea or symptoms of sleep apnea:  None    Diet:  Carbohydrate counting    Frequency of exercise:  1 day/week    Duration of exercise:  15-30 minutes    Taking medications regularly:  Yes    Medication side effects:  Lightheadedness    PHQ-2 Total Score: 0    Additional concerns today:  No      Wt Readings from Last 4 Encounters:   04/23/21 57.6 kg (127 lb)   03/06/20 56.7 kg (125 lb)   05/14/19 60.2 kg (132 lb 12.8 oz)   01/17/19 60.6 kg (133 lb 9.6 oz)         PROBLEMS TO ADD ON...  Medication follow up-  Refill Flonase  Uses daily  Helps symptoms  No side effects     GERD:  Well controlled with omeprazole  No side effects    Hyperlipidemia Follow-Up      Are you regularly taking any medication or supplement to lower your cholesterol?   Yes- atorvastatin    Are you having muscle aches or other side effects that you think could be caused by your cholesterol lowering medication?  Yes- she thinks she may have in the past- sometimes    Hypertension Follow-up      Do you check your blood pressure regularly outside of the clinic? No     Are you following a low salt diet? Yes    Are your blood pressures ever more than 140 on the top number (systolic) OR more   than 90 on the bottom number (diastolic), for example 140/90?  Doesn't check       Anxiety / depression follow up-   Refill trazodone- uses only PRN  Symptoms- stable;  She feels this was related to menopause.  Is feeling good now.  Significant life event- no          Today's PHQ-2 Score:   PHQ-2 ( 1999 [employer]) 4/22/2021   Q1: Little interest or pleasure in doing things 0   Q2: Feeling down, depressed or hopeless 0   PHQ-2  Score 0   Q1: Little interest or pleasure in doing things Not at all   Q2: Feeling down, depressed or hopeless Not at all   PHQ-2 Score 0       Abuse: Current or Past (Physical, Sexual or Emotional) - No  Do you feel safe in your environment? Yes    Have you ever done Advance Care Planning? (For example, a Health Directive, POLST, or a discussion with a medical provider or your loved ones about your wishes): No, advance care planning information given to patient to review.  Advanced care planning was discussed at today's visit.    Social History     Tobacco Use     Smoking status: Former Smoker     Packs/day: 0.30     Years: 9.00     Pack years: 2.70     Types: Cigarettes     Start date: 2001     Quit date: 2/15/2010     Years since quittin.1     Smokeless tobacco: Never Used   Substance Use Topics     Alcohol use: Yes     Alcohol/week: 3.3 standard drinks     Comment: socially     If you drink alcohol do you typically have >3 drinks per day or >7 drinks per week? No    Alcohol Use 2021   Prescreen: >3 drinks/day or >7 drinks/week? No   Prescreen: >3 drinks/day or >7 drinks/week? -       Reviewed orders with patient.  Reviewed health maintenance and updated orders accordingly - Yes      Breast Cancer Screening:  Any new diagnosis of family breast, ovarian, or bowel cancer? No    FSH-7: No flowsheet data found.    Mammogram Screening: Recommended annual mammography  Pertinent mammograms are reviewed under the imaging tab.    History of abnormal Pap smear: YES - updated in Problem List and Health Maintenance accordingly  PAP / HPV Latest Ref Rng & Units 3/6/2020 2017   PAP - NIL NIL   HPV 16 DNA NEG:Negative Negative Negative   HPV 18 DNA NEG:Negative Negative Negative   OTHER HR HPV NEG:Negative Negative Negative     Reviewed and updated as needed this visit by clinical staff  Tobacco  Allergies  Meds              Reviewed and updated as needed this visit by Provider                    Review  of Systems   Constitutional: Negative for chills and fever.   HENT: Negative for congestion, ear pain, hearing loss and sore throat.    Eyes: Negative for pain and visual disturbance.   Respiratory: Negative for cough and shortness of breath.    Cardiovascular: Negative for chest pain, palpitations and peripheral edema.   Gastrointestinal: Negative for abdominal pain, constipation, diarrhea, heartburn, hematochezia and nausea.   Breasts:  Negative for tenderness, breast mass and discharge.   Genitourinary: Negative for dysuria, frequency, genital sores, hematuria, pelvic pain, urgency, vaginal bleeding and vaginal discharge.   Musculoskeletal: Positive for arthralgias. Negative for joint swelling and myalgias.   Skin: Negative for rash.   Neurological: Positive for headaches. Negative for dizziness, weakness and paresthesias.   Psychiatric/Behavioral: Negative for mood changes. The patient is not nervous/anxious.           OBJECTIVE:   /80 (BP Location: Right arm)   Pulse 72   Temp 98.2  F (36.8  C) (Tympanic)   Ht 1.524 m (5')   Wt 57.6 kg (127 lb)   LMP 09/23/2018   SpO2 97%   BMI 24.80 kg/m    Physical Exam  GENERAL APPEARANCE: healthy, alert and no distress  EYES: Eyes grossly normal to inspection, PERRL and conjunctivae and sclerae normal  HENT: ear canals and TM's normal, nose and mouth without ulcers or lesions, oropharynx clear and oral mucous membranes moist  NECK: no adenopathy, no asymmetry, masses, or scars and thyroid normal to palpation  RESP: lungs clear to auscultation - no rales, rhonchi or wheezes  BREAST: normal without masses, tenderness or nipple discharge and no palpable axillary masses or adenopathy  CV: regular rate and rhythm, normal S1 S2, no S3 or S4, no murmur, click or rub, no peripheral edema and peripheral pulses strong  ABDOMEN: soft, nontender, no hepatosplenomegaly, no masses and bowel sounds normal  MS: no musculoskeletal defects are noted and gait is age appropriate  without ataxia  SKIN: no suspicious lesions or rashes  NEURO: Normal strength and tone, sensory exam grossly normal, mentation intact and speech normal  PSYCH: mentation appears normal and affect normal/bright        ASSESSMENT/PLAN:       ICD-10-CM    1. Routine general medical examination at a health care facility  Z00.00 HIV Antigen Antibody Combo     **Hepatitis C Screen Reflex to RNA FUTURE anytime     2. Hyperlipidemia with target LDL less than 130  E78.5 Continue atorvastatin (LIPITOR) 40 MG tablet     Lipid panel reflex to direct LDL Fasting - recheck today     OFFICE/OUTPT VISIT,EST,LEVL III     3. Essential hypertension with goal blood pressure less than 140/90  I10 Well controlled.  metoprolol succinate ER (TOPROL-XL) 50 MG 24 hr tablet     Basic metabolic panel  (Ca, Cl, CO2, Creat, Gluc, K, Na, BUN)     OFFICE/OUTPT VISIT,EST,LEVL III     4. Gastroesophageal reflux disease without esophagitis  K21.9 Well controlled with omeprazole  OFFICE/OUTPT VISIT,EST,LEVL III     5. Major depressive disorder, recurrent episode, moderate (H)  F33.1 Well controlled.  traZODone (DESYREL) 50 MG tablet     OFFICE/OUTPT VISIT,EST,LEVL III     6. PATRICIA (generalized anxiety disorder)  F41.1 Well controlled.  traZODone (DESYREL) 50 MG tablet     OFFICE/OUTPT VISIT,EST,LEVL III     7. Seasonal allergic rhinitis, unspecified trigger  J30.2 Well controlled.  fluticasone (FLONASE) 50 MCG/ACT nasal spray     OFFICE/OUTPT VISIT,EST,LEVL III     8. Colon cancer screening  Z12.11 Fecal colorectal cancer screen (FIT)     9. Encounter for screening mammogram for breast cancer    Z12.31 MA Screen Bilateral w/Fritz   10. Impaired fasting glucose  R73.01 Hemoglobin A1c       Patient has been advised of split billing requirements and indicates understanding: Yes by AFR and CMA    COUNSELING:  Reviewed preventive health counseling, as reflected in patient instructions    Estimated body mass index is 24.8 kg/m  as calculated from the  following:    Height as of this encounter: 1.524 m (5').    Weight as of this encounter: 57.6 kg (127 lb).      She reports that she quit smoking about 11 years ago. Her smoking use included cigarettes. She started smoking about 19 years ago. She has a 2.70 pack-year smoking history. She has never used smokeless tobacco.      The risks, benefits and treatment options of prescribed medications or other treatments have been discussed with the patient. The patient verbalized their understanding and should call or follow up if no improvement or if they develop further problems.    SOLANGE Wall [facility]

## 2021-04-23 ENCOUNTER — OFFICE VISIT (OUTPATIENT)
Dept: FAMILY MEDICINE | Facility: CLINIC | Age: 56
End: 2021-04-23
Payer: COMMERCIAL

## 2021-04-23 VITALS
WEIGHT: 127 LBS | HEART RATE: 72 BPM | BODY MASS INDEX: 24.94 KG/M2 | HEIGHT: 60 IN | SYSTOLIC BLOOD PRESSURE: 124 MMHG | OXYGEN SATURATION: 97 % | DIASTOLIC BLOOD PRESSURE: 80 MMHG | TEMPERATURE: 98.2 F

## 2021-04-23 DIAGNOSIS — Z12.31 ENCOUNTER FOR SCREENING MAMMOGRAM FOR BREAST CANCER: ICD-10-CM

## 2021-04-23 DIAGNOSIS — I10 ESSENTIAL HYPERTENSION WITH GOAL BLOOD PRESSURE LESS THAN 140/90: ICD-10-CM

## 2021-04-23 DIAGNOSIS — E78.5 HYPERLIPIDEMIA WITH TARGET LDL LESS THAN 130: ICD-10-CM

## 2021-04-23 DIAGNOSIS — Z12.11 COLON CANCER SCREENING: ICD-10-CM

## 2021-04-23 DIAGNOSIS — Z00.00 ROUTINE GENERAL MEDICAL EXAMINATION AT A HEALTH CARE FACILITY: Primary | ICD-10-CM

## 2021-04-23 DIAGNOSIS — K21.9 GASTROESOPHAGEAL REFLUX DISEASE WITHOUT ESOPHAGITIS: ICD-10-CM

## 2021-04-23 DIAGNOSIS — J30.2 SEASONAL ALLERGIC RHINITIS, UNSPECIFIED TRIGGER: ICD-10-CM

## 2021-04-23 DIAGNOSIS — F41.1 GAD (GENERALIZED ANXIETY DISORDER): ICD-10-CM

## 2021-04-23 DIAGNOSIS — R73.01 IMPAIRED FASTING GLUCOSE: ICD-10-CM

## 2021-04-23 DIAGNOSIS — F33.1 MAJOR DEPRESSIVE DISORDER, RECURRENT EPISODE, MODERATE (H): ICD-10-CM

## 2021-04-23 PROBLEM — R73.03 PRE-DIABETES: Status: ACTIVE | Noted: 2021-04-23

## 2021-04-23 LAB
ANION GAP SERPL CALCULATED.3IONS-SCNC: 5 MMOL/L (ref 3–14)
BUN SERPL-MCNC: 12 MG/DL (ref 7–30)
CALCIUM SERPL-MCNC: 9.8 MG/DL (ref 8.5–10.1)
CHLORIDE SERPL-SCNC: 104 MMOL/L (ref 94–109)
CHOLEST SERPL-MCNC: 190 MG/DL
CO2 SERPL-SCNC: 27 MMOL/L (ref 20–32)
CREAT SERPL-MCNC: 0.62 MG/DL (ref 0.52–1.04)
GFR SERPL CREATININE-BSD FRML MDRD: >90 ML/MIN/{1.73_M2}
GLUCOSE SERPL-MCNC: 99 MG/DL (ref 70–99)
HBA1C MFR BLD: 5.7 % (ref 0–5.6)
HCV AB SERPL QL IA: NONREACTIVE
HDLC SERPL-MCNC: 58 MG/DL
HIV 1+2 AB+HIV1 P24 AG SERPL QL IA: NONREACTIVE
LDLC SERPL CALC-MCNC: 101 MG/DL
NONHDLC SERPL-MCNC: 132 MG/DL
POTASSIUM SERPL-SCNC: 3.8 MMOL/L (ref 3.4–5.3)
SODIUM SERPL-SCNC: 136 MMOL/L (ref 133–144)
TRIGL SERPL-MCNC: 153 MG/DL

## 2021-04-23 PROCEDURE — 99213 OFFICE O/P EST LOW 20 MIN: CPT | Mod: 25 | Performed by: NURSE PRACTITIONER

## 2021-04-23 PROCEDURE — 99396 PREV VISIT EST AGE 40-64: CPT | Performed by: NURSE PRACTITIONER

## 2021-04-23 PROCEDURE — 36415 COLL VENOUS BLD VENIPUNCTURE: CPT | Performed by: NURSE PRACTITIONER

## 2021-04-23 PROCEDURE — 87389 HIV-1 AG W/HIV-1&-2 AB AG IA: CPT | Performed by: NURSE PRACTITIONER

## 2021-04-23 PROCEDURE — 86803 HEPATITIS C AB TEST: CPT | Performed by: NURSE PRACTITIONER

## 2021-04-23 PROCEDURE — 80061 LIPID PANEL: CPT | Performed by: NURSE PRACTITIONER

## 2021-04-23 PROCEDURE — 83036 HEMOGLOBIN GLYCOSYLATED A1C: CPT | Performed by: NURSE PRACTITIONER

## 2021-04-23 PROCEDURE — 80048 BASIC METABOLIC PNL TOTAL CA: CPT | Performed by: NURSE PRACTITIONER

## 2021-04-23 RX ORDER — FLUTICASONE PROPIONATE 50 MCG
1 SPRAY, SUSPENSION (ML) NASAL DAILY
Qty: 16 G | Refills: 11 | Status: SHIPPED | OUTPATIENT
Start: 2021-04-23 | End: 2022-07-12

## 2021-04-23 RX ORDER — TRAZODONE HYDROCHLORIDE 50 MG/1
TABLET, FILM COATED ORAL
Qty: 90 TABLET | Refills: 3 | Status: SHIPPED | OUTPATIENT
Start: 2021-04-23 | End: 2022-07-12

## 2021-04-23 RX ORDER — METOPROLOL SUCCINATE 50 MG/1
50 TABLET, EXTENDED RELEASE ORAL DAILY
Qty: 90 TABLET | Refills: 3 | Status: SHIPPED | OUTPATIENT
Start: 2021-04-23 | End: 2022-05-13

## 2021-04-23 RX ORDER — ATORVASTATIN CALCIUM 40 MG/1
40 TABLET, FILM COATED ORAL DAILY
Qty: 90 TABLET | Refills: 3 | Status: SHIPPED | OUTPATIENT
Start: 2021-04-23 | End: 2022-05-13

## 2021-04-23 ASSESSMENT — ENCOUNTER SYMPTOMS
WEAKNESS: 0
COUGH: 0
HEMATOCHEZIA: 0
BREAST MASS: 0
DYSURIA: 0
DIARRHEA: 0
HEARTBURN: 0
ARTHRALGIAS: 1
PARESTHESIAS: 0
JOINT SWELLING: 0
FREQUENCY: 0
CHILLS: 0
SHORTNESS OF BREATH: 0
NAUSEA: 0
ABDOMINAL PAIN: 0
MYALGIAS: 0
EYE PAIN: 0
PALPITATIONS: 0
NERVOUS/ANXIOUS: 0
CONSTIPATION: 0
HEADACHES: 1
DIZZINESS: 0
FEVER: 0
HEMATURIA: 0
SORE THROAT: 0

## 2021-04-23 ASSESSMENT — PATIENT HEALTH QUESTIONNAIRE - PHQ9: SUM OF ALL RESPONSES TO PHQ QUESTIONS 1-9: 1

## 2021-04-23 ASSESSMENT — MIFFLIN-ST. JEOR: SCORE: 1092.57

## 2021-04-23 NOTE — LETTER
April 27, 2021      Elizabeth Santizo  [address] [id number]        Dear ,    We are writing to inform you of your test results.    Hepatitis C and HIV screening tests were negative.   Ilana Mares CNP     Component      Latest Ref Rng & Units 4/23/2021   HIV Antigen Antibody Combo      NR:Nonreactive     Nonreactive   Hepatitis C Antibody      NR:Nonreactive Nonreactive     If you have any questions or concerns, please call the clinic at the number listed above.       Sincerely,      SOLANGE Wall CNP

## 2021-05-14 ENCOUNTER — E-VISIT (OUTPATIENT)
Dept: URGENT CARE | Facility: URGENT CARE | Age: 56
End: 2021-05-14
Payer: COMMERCIAL

## 2021-05-14 DIAGNOSIS — J32.9 OTHER SINUSITIS, UNSPECIFIED CHRONICITY: Primary | ICD-10-CM

## 2021-05-14 PROCEDURE — 99421 OL DIG E/M SVC 5-10 MIN: CPT | Performed by: PHYSICIAN ASSISTANT

## 2021-05-14 RX ORDER — DOXYCYCLINE HYCLATE 100 MG
100 TABLET ORAL 2 TIMES DAILY
Qty: 14 TABLET | Refills: 0 | Status: SHIPPED | OUTPATIENT
Start: 2021-05-14 | End: 2021-05-21

## 2021-05-14 NOTE — PATIENT INSTRUCTIONS
Dear Elizabeth Santizo    After reviewing your responses, I've been able to diagnose you with?a sinus infection caused by bacteria.?     Based on your responses and diagnosis, I have prescribed anbiotics and have ordered you a covid test.  [facility] can go to a [facility] to have your covid testing done.  I have sent your medication to your pharmacy.?     It is also important to stay well hydrated, get lots of rest and take over-the-counter decongestants,?tylenol?or ibuprofen if you?are able to?take those medications per your primary care provider to help relieve discomfort.?     It is important that you take?all of?your prescribed medication even if your symptoms are improving after a few doses.? Taking?all of?your medicine helps prevent the symptoms from returning.?     If your symptoms worsen, you develop severe headache, vomiting, high fever (>102), or are not improving in 7 days, please contact your primary care provider for an appointment or visit any of our convenient Walk-in Care or Urgent Care Centers to be seen which can be found on our website?here.?     Thanks again for choosing?us?as your health care partner,?   ?  Ivan Torres PA-C?

## 2021-07-20 ENCOUNTER — E-VISIT (OUTPATIENT)
Dept: FAMILY MEDICINE | Facility: CLINIC | Age: 56
End: 2021-07-20
Payer: COMMERCIAL

## 2021-07-20 DIAGNOSIS — J01.90 ACUTE BACTERIAL SINUSITIS: Primary | ICD-10-CM

## 2021-07-20 DIAGNOSIS — B96.89 ACUTE BACTERIAL SINUSITIS: Primary | ICD-10-CM

## 2021-07-20 PROCEDURE — 99421 OL DIG E/M SVC 5-10 MIN: CPT | Performed by: NURSE PRACTITIONER

## 2021-07-21 ENCOUNTER — TELEPHONE (OUTPATIENT)
Dept: FAMILY MEDICINE | Facility: CLINIC | Age: 56
End: 2021-07-21

## 2021-07-21 DIAGNOSIS — J01.90 ACUTE BACTERIAL SINUSITIS: ICD-10-CM

## 2021-07-21 DIAGNOSIS — B96.89 ACUTE BACTERIAL SINUSITIS: ICD-10-CM

## 2021-07-21 RX ORDER — DOXYCYCLINE HYCLATE 100 MG
100 TABLET ORAL 2 TIMES DAILY
Qty: 14 TABLET | Refills: 0 | Status: SHIPPED | OUTPATIENT
Start: 2021-07-21 | End: 2021-07-21

## 2021-07-21 RX ORDER — DOXYCYCLINE HYCLATE 100 MG
100 TABLET ORAL 2 TIMES DAILY
Qty: 14 TABLET | Refills: 0 | Status: SHIPPED | OUTPATIENT
Start: 2021-07-21 | End: 2021-07-27

## 2021-07-21 NOTE — TELEPHONE ENCOUNTER
Reason for Call:  Other Prescription    Detailed comments: Patient would like a hard copy printed of Doxycycline tabs. Please call patient on cell phone when hard copy prescription is ready    Phone Number Patient can be reached at: Cell number on file:    Telephone Information:   Mobile [phone]       Best Time: Any    Can we leave a detailed message on this number? YES    Call taken on 7/21/2021 at 1:46 PM by Chandrika Kirkpatrick

## 2021-07-21 NOTE — PATIENT INSTRUCTIONS
Dear Elizabeth Santizo    After reviewing your responses, I've been able to diagnose you with?a sinus infection caused by bacteria.?     Based on your responses and diagnosis, I have prescribed doxycycline to treat your symptoms. I have sent this to your pharmacy.?     However, if this round of antibiotics do not work, then this isn't a bacterial infection and you will need to be evaluated in clinic.    It is also important to stay well hydrated, get lots of rest and take over-the-counter decongestants,?tylenol?or ibuprofen if you?are able to?take those medications per your primary care provider to help relieve discomfort.?     It is important that you take?all of?your prescribed medication even if your symptoms are improving after a few doses.? Taking?all of?your medicine helps prevent the symptoms from returning.?     If your symptoms worsen, you develop severe headache, vomiting, high fever (>102), or are not improving in 7 days, please contact your primary care provider for an appointment or visit any of our convenient Walk-in Care or Urgent Care Centers to be seen which can be found on our website?here.?     Thanks again for choosing?us?as your health care partner,?   ?  Ilana Mares, SOLANGE CNP?

## 2021-07-21 NOTE — TELEPHONE ENCOUNTER
Prescription left at  for patient. Patient notified. Chandrika Kirkpatrick on 7/21/2021 at 2:22 PM

## 2021-07-27 ENCOUNTER — LAB (OUTPATIENT)
Dept: LAB | Facility: CLINIC | Age: 56
End: 2021-07-27
Payer: COMMERCIAL

## 2021-07-27 ENCOUNTER — OFFICE VISIT (OUTPATIENT)
Dept: FAMILY MEDICINE | Facility: CLINIC | Age: 56
End: 2021-07-27
Payer: COMMERCIAL

## 2021-07-27 VITALS
TEMPERATURE: 97.8 F | BODY MASS INDEX: 25.21 KG/M2 | SYSTOLIC BLOOD PRESSURE: 132 MMHG | RESPIRATION RATE: 12 BRPM | OXYGEN SATURATION: 98 % | DIASTOLIC BLOOD PRESSURE: 86 MMHG | HEART RATE: 60 BPM | HEIGHT: 60 IN | WEIGHT: 128.4 LBS

## 2021-07-27 DIAGNOSIS — R10.9 LEFT FLANK PAIN: Primary | ICD-10-CM

## 2021-07-27 DIAGNOSIS — R10.13 EPIGASTRIC PAIN: ICD-10-CM

## 2021-07-27 DIAGNOSIS — Z12.11 COLON CANCER SCREENING: ICD-10-CM

## 2021-07-27 LAB
ALBUMIN SERPL-MCNC: 4.1 G/DL (ref 3.4–5)
ALBUMIN UR-MCNC: NEGATIVE MG/DL
ALP SERPL-CCNC: 64 U/L (ref 40–150)
ALT SERPL W P-5'-P-CCNC: 34 U/L (ref 0–50)
ANION GAP SERPL CALCULATED.3IONS-SCNC: 4 MMOL/L (ref 3–14)
APPEARANCE UR: CLEAR
AST SERPL W P-5'-P-CCNC: 17 U/L (ref 0–45)
BILIRUB SERPL-MCNC: 0.5 MG/DL (ref 0.2–1.3)
BILIRUB UR QL STRIP: NEGATIVE
BUN SERPL-MCNC: 10 MG/DL (ref 7–30)
CALCIUM SERPL-MCNC: 9.5 MG/DL (ref 8.5–10.1)
CHLORIDE BLD-SCNC: 108 MMOL/L (ref 94–109)
CO2 SERPL-SCNC: 27 MMOL/L (ref 20–32)
COLOR UR AUTO: YELLOW
CREAT SERPL-MCNC: 0.53 MG/DL (ref 0.52–1.04)
CRP SERPL-MCNC: <2.9 MG/L (ref 0–8)
ERYTHROCYTE [DISTWIDTH] IN BLOOD BY AUTOMATED COUNT: 11.4 % (ref 10–15)
ERYTHROCYTE [SEDIMENTATION RATE] IN BLOOD BY WESTERGREN METHOD: 12 MM/HR (ref 0–30)
GFR SERPL CREATININE-BSD FRML MDRD: >90 ML/MIN/1.73M2
GLUCOSE BLD-MCNC: 105 MG/DL (ref 70–99)
GLUCOSE UR STRIP-MCNC: NEGATIVE MG/DL
HCT VFR BLD AUTO: 40 % (ref 35–47)
HGB BLD-MCNC: 13.2 G/DL (ref 11.7–15.7)
HGB UR QL STRIP: NEGATIVE
KETONES UR STRIP-MCNC: NEGATIVE MG/DL
LEUKOCYTE ESTERASE UR QL STRIP: NEGATIVE
LIPASE SERPL-CCNC: 178 U/L (ref 73–393)
MCH RBC QN AUTO: 31.1 PG (ref 26.5–33)
MCHC RBC AUTO-ENTMCNC: 33 G/DL (ref 31.5–36.5)
MCV RBC AUTO: 94 FL (ref 78–100)
NITRATE UR QL: NEGATIVE
PH UR STRIP: 6.5 [PH] (ref 5–7)
PLATELET # BLD AUTO: 306 10E3/UL (ref 150–450)
POTASSIUM BLD-SCNC: 4 MMOL/L (ref 3.4–5.3)
PROT SERPL-MCNC: 7.5 G/DL (ref 6.8–8.8)
RBC # BLD AUTO: 4.25 10E6/UL (ref 3.8–5.2)
SODIUM SERPL-SCNC: 139 MMOL/L (ref 133–144)
SP GR UR STRIP: 1.01 (ref 1–1.03)
UROBILINOGEN UR STRIP-ACNC: 0.2 E.U./DL
WBC # BLD AUTO: 5.5 10E3/UL (ref 4–11)

## 2021-07-27 PROCEDURE — 83690 ASSAY OF LIPASE: CPT | Performed by: INTERNAL MEDICINE

## 2021-07-27 PROCEDURE — 85027 COMPLETE CBC AUTOMATED: CPT | Performed by: INTERNAL MEDICINE

## 2021-07-27 PROCEDURE — 36415 COLL VENOUS BLD VENIPUNCTURE: CPT | Performed by: INTERNAL MEDICINE

## 2021-07-27 PROCEDURE — 82274 ASSAY TEST FOR BLOOD FECAL: CPT

## 2021-07-27 PROCEDURE — 81003 URINALYSIS AUTO W/O SCOPE: CPT | Performed by: INTERNAL MEDICINE

## 2021-07-27 PROCEDURE — 99214 OFFICE O/P EST MOD 30 MIN: CPT | Performed by: INTERNAL MEDICINE

## 2021-07-27 PROCEDURE — 85652 RBC SED RATE AUTOMATED: CPT | Performed by: INTERNAL MEDICINE

## 2021-07-27 PROCEDURE — 80053 COMPREHEN METABOLIC PANEL: CPT | Performed by: INTERNAL MEDICINE

## 2021-07-27 PROCEDURE — 86140 C-REACTIVE PROTEIN: CPT | Performed by: INTERNAL MEDICINE

## 2021-07-27 ASSESSMENT — PAIN SCALES - GENERAL: PAINLEVEL: MILD PAIN (2)

## 2021-07-27 ASSESSMENT — MIFFLIN-ST. JEOR: SCORE: 1098.92

## 2021-07-27 NOTE — PATIENT INSTRUCTIONS
Start the omeprazole after you collect the H pylori stool sample.  If pain is not improving, schedule the ultrasound to check for gallstones.  If these tests are normal, we would consider an EGD scope of the stomach next.     Abdominal ultrasound was ordered.  Please call [phone] to schedule.

## 2021-07-27 NOTE — PROGRESS NOTES
Assessment & Plan     Left flank pain  and  Epigastric pain    Elizabeth present with episodes of epigastric and LUQ and left flank pain that started yesterday along with bloating and nausea.  Labs today were reassuring.  No increase in hepatic panel or lipase to suggest a hepatic or pancreatic cause.  WBC and inflammatory markers normal, seems less likely to be infectious cause such as diverticulitis.  U/A normal and pain not really located over the kidney, so less likely renal colic.  She does have a history of stomach ulcers, so that could be the etiology.  She has not taken a PPI in 2 weeks, so we'll collect an H pylori stool sample and start the PPI after sample is collected to see if that helps symptoms.  If not improving, suggest first getting RUQ U/S to rule out gallstones (though location of pain would not be typical) and then proceeding with EGD if other testing is normal.      - CBC with platelets; Future  - Comprehensive metabolic panel (BMP + Alb, Alk Phos, ALT, AST, Total. Bili, TP); Future  - Lipase; Future  - UA Macro with Reflex to Micro and Culture - lab collect; Future  - CRP, inflammation; Future  - ESR: Erythrocyte sedimentation rate; Future  - Helicobacter pylori Antigen Stool; Future  - US Abdomen Limited; Future    NannetteRia Jordan MD  [facility]    Subjective   Elizabeth is a 56 year old who presents for the following health issues     HPI     Abdominal/Flank Pain  Onset/Duration: Woke her up yesterday morning  Description:   Character: Dull ache.  Pain comes in waves.  Improved yesterday afternoon, then woke up again with pain last night though not as bad as yesterday  Location: epigastric region, also in mid back, feels like it goes through, also around from the back to the abdomen  Radiation: down left back  Intensity: 2/10 currently, 10/10 at worst  Progression of Symptoms:  intermittent  Accompanying Signs & Symptoms:  Fever/Chills: no  Gas/Bloating: YES- bloating when pain  was worst  Nausea: YES- when pain was the worst, had dry heaves  Vomitting: no  Diarrhea: no  Constipation: no  No bloody or black stool  Dysuria or Hematuria: no  History:   Trauma: no  Previous similar pain: YES- many years ago, tested for gallbladder about 10-12 years ago in [place].  Had similar symptoms she thinks.  Had a HIDA scan that was okay, no gallstones  Previous tests done: none  History of stomach ulcer in the past (30 years ago)  Precipitating factors:   Does the pain change with:     Food: no    Bowel Movement: no    Urination: no   Other factors:  no  Therapies tried and outcome: ibuprofen- no help yesterday but maybe helped today  Not taking omeprazole regularly, has been a couple of weeks    Patient's last menstrual period was 09/23/2018.      Review of Systems   Constitutional, gi and gu systems are negative, except as otherwise noted.      Objective    /86 (BP Location: Right arm, Patient Position: Chair, Cuff Size: Adult Regular)   Pulse 60   Temp 97.8  F (36.6  C) (Tympanic)   Resp 12   Ht 1.524 m (5')   Wt 58.2 kg (128 lb 6.4 oz)   LMP 09/23/2018   SpO2 98%   BMI 25.08 kg/m    Body mass index is 25.08 kg/m .  Physical Exam     GENERAL: healthy, alert and no distress  RESP: lungs clear to auscultation - no rales, rhonchi or wheezes  CV: regular rate and rhythm, normal S1 S2, no S3 or S4, no murmur, click or rub  ABDOMEN: soft, tender over the left flank, no hepatosplenomegaly, no masses and bowel sounds normal  BACK: no CVA tenderness     Results for orders placed or performed in visit on 07/27/21 (from the past 24 hour(s))   CBC with platelets   Result Value Ref Range    WBC Count 5.5 4.0 - 11.0 10e3/uL    RBC Count 4.25 3.80 - 5.20 10e6/uL    Hemoglobin 13.2 11.7 - 15.7 g/dL    Hematocrit 40.0 35.0 - 47.0 %    MCV 94 78 - 100 fL    MCH 31.1 26.5 - 33.0 pg    MCHC 33.0 31.5 - 36.5 g/dL    RDW 11.4 10.0 - 15.0 %    Platelet Count 306 150 - 450 10e3/uL   Comprehensive metabolic panel  (BMP + Alb, Alk Phos, ALT, AST, Total. Bili, TP)   Result Value Ref Range    Sodium 139 133 - 144 mmol/L    Potassium 4.0 3.4 - 5.3 mmol/L    Chloride 108 94 - 109 mmol/L    Carbon Dioxide (CO2) 27 20 - 32 mmol/L    Anion Gap 4 3 - 14 mmol/L    Urea Nitrogen 10 7 - 30 mg/dL    Creatinine 0.53 0.52 - 1.04 mg/dL    Calcium 9.5 8.5 - 10.1 mg/dL    Glucose 105 (H) 70 - 99 mg/dL    Alkaline Phosphatase 64 40 - 150 U/L    AST 17 0 - 45 U/L    ALT 34 0 - 50 U/L    Protein Total 7.5 6.8 - 8.8 g/dL    Albumin 4.1 3.4 - 5.0 g/dL    Bilirubin Total 0.5 0.2 - 1.3 mg/dL    GFR Estimate >90 >60 mL/min/1.73m2   Lipase   Result Value Ref Range    Lipase 178 73 - 393 U/L   UA Macro with Reflex to Micro and Culture - lab collect    Specimen: Urine, Clean Catch   Result Value Ref Range    Color Urine Yellow Colorless, Straw, Light Yellow, Yellow    Appearance Urine Clear Clear    Glucose Urine Negative Negative mg/dL    Bilirubin Urine Negative Negative    Ketones Urine Negative Negative mg/dL    Specific Gravity Urine 1.010 1.003 - 1.035    Blood Urine Negative Negative    pH Urine 6.5 5.0 - 7.0    Protein Albumin Urine Negative Negative mg/dL    Urobilinogen Urine 0.2 0.2, 1.0 E.U./dL    Nitrite Urine Negative Negative    Leukocyte Esterase Urine Negative Negative    Narrative    Microscopic not indicated   CRP, inflammation   Result Value Ref Range    CRP Inflammation <2.9 0.0 - 8.0 mg/L   ESR: Erythrocyte sedimentation rate   Result Value Ref Range    Erythrocyte Sedimentation Rate 12 0 - 30 mm/hr

## 2021-07-28 ENCOUNTER — LAB (OUTPATIENT)
Dept: LAB | Facility: CLINIC | Age: 56
End: 2021-07-28
Payer: COMMERCIAL

## 2021-07-28 DIAGNOSIS — R10.9 LEFT FLANK PAIN: ICD-10-CM

## 2021-07-28 DIAGNOSIS — R10.13 EPIGASTRIC PAIN: ICD-10-CM

## 2021-07-28 PROCEDURE — 87338 HPYLORI STOOL AG IA: CPT

## 2021-07-30 LAB — H PYLORI AG STL QL IA: NEGATIVE

## 2021-08-01 LAB — HEMOCCULT STL QL IA: NEGATIVE

## 2021-09-19 ENCOUNTER — E-VISIT (OUTPATIENT)
Dept: FAMILY MEDICINE | Facility: CLINIC | Age: 56
End: 2021-09-19
Payer: COMMERCIAL

## 2021-09-19 DIAGNOSIS — J01.90 ACUTE BACTERIAL SINUSITIS: Primary | ICD-10-CM

## 2021-09-19 DIAGNOSIS — B96.89 ACUTE BACTERIAL SINUSITIS: Primary | ICD-10-CM

## 2021-09-19 PROCEDURE — 99421 OL DIG E/M SVC 5-10 MIN: CPT | Performed by: NURSE PRACTITIONER

## 2021-09-20 RX ORDER — DOXYCYCLINE HYCLATE 100 MG
100 TABLET ORAL 2 TIMES DAILY
Qty: 14 TABLET | Refills: 0 | Status: SHIPPED | OUTPATIENT
Start: 2021-09-20 | End: 2021-09-27

## 2021-09-20 NOTE — ADDENDUM NOTE
Encounter addended by: Madhavi Jones OT on: 3/23/2018  9:38 AM<BR>     Actions taken: Sign clinical note Former smoker

## 2021-09-20 NOTE — PATIENT INSTRUCTIONS
Dear Elizabeth Santizo    After reviewing your responses, I've been able to diagnose you with?a sinus infection caused by bacteria.?     Based on your responses and diagnosis, I have prescribed doxycycline to treat your symptoms. I have sent this to your pharmacy.?     It is also important to stay well hydrated, get lots of rest and take over-the-counter decongestants,?tylenol?or ibuprofen if you?are able to?take those medications per your primary care provider to help relieve discomfort.?     It is important that you take?all of?your prescribed medication even if your symptoms are improving after a few doses.? Taking?all of?your medicine helps prevent the symptoms from returning.?     If your symptoms worsen, you develop severe headache, vomiting, high fever (>102), or are not improving in 7 days, please contact your primary care provider for an appointment or visit any of our convenient Walk-in Care or Urgent Care Centers to be seen which can be found on our website?here.?     Thanks again for choosing?us?as your health care partner,?   ?  SOLANGE Wall CNP?     Sinusitis (Antibiotic Treatment)    The sinuses are air-filled spaces within the bones of the face. They connect to the inside of the nose. Sinusitis is an inflammation of the tissue that lines the sinuses. Sinusitis can occur during a cold. It can also happen due to allergies to pollens and other particles in the air. Sinusitis can cause symptoms of sinus congestion and a feeling of fullness. A sinus infection causes fever, headache, and facial pain. There is often green or yellow fluid draining from the nose or into the back of the throat (post-nasal drip). You have been given antibiotics to treat this condition.   Home care    Take the full course of antibiotics as instructed. Don't stop taking them, even when you feel better.    Drink plenty of water, hot tea, and other liquids as directed by the healthcare provider. This may help thin  nasal mucus. It also may help your sinuses drain fluids.    Heat may help soothe painful areas of your face. Use a towel soaked in hot water. Or,  the shower and direct the warm spray onto your face. Using a vaporizer along with a menthol rub at night may also help soothe symptoms.     An expectorant with guaifenesin may help thin nasal mucus and help your sinuses drain fluids. Talk with your provider or pharmacists before taking an over-the-counter (OTC) medicine if you have any questions about it or its side effects..    You can use an OTC decongestant, unless a similar medicine was prescribed to you. Nasal sprays work the fastest. Use one that contains phenylephrine or oxymetazoline. First blow your nose gently. Then use the spray. Don't use these medicines more often than directed on the label. If you do, your symptoms may get worse. You may also take pills that contain pseudoephedrine. Don t use products that combine multiple medicines. This is because side effects may be increased. Read labels. You can also ask the pharmacist for help. (People with high blood pressure should not use decongestants. They can raise blood pressure.) Talk with your provider or pharmacist if you have any questions about the medicine..    OTC antihistamines may help if allergies contributed to your sinusitis. Talk with your provider or pharmacist if you have any questions about the medicine..    Don't use nasal rinses or irrigation during an acute sinus infection, unless your healthcare provider tells you to. Rinsing may spread the infection to other areas in your sinuses.    Use acetaminophen or ibuprofen to control pain, unless another pain medicine was prescribed to you. If you have chronic liver or kidney disease or ever had a stomach ulcer, talk with your healthcare provider before using these medicines. Never give aspirin to anyone under age 18 who is ill with a fever. It may cause severe liver damage.    Don't smoke.  This can make symptoms worse.    Follow-up care  Follow up with your healthcare provider, or as advised.   When to seek medical advice  Call your healthcare provider if any of these occur:     Facial pain or headache that gets worse    Stiff neck    Unusual drowsiness or confusion    Swelling of your forehead or eyelids    Symptoms don't go away in 10 days    Vision problems, such as blurred or double vision    Fever of 100.4 F (38 C) or higher, or as directed by your healthcare provider  Call 911  Call 911 if any of these occur:     Seizure    Trouble breathing    Feeling dizzy or faint    Fingernails, skin or lips look blue, purple , or gray  Prevention  Here are steps you can take to help prevent an infection:     Keep good hand washing habits.    Don t have close contact with people who have sore throats, colds, or other upper respiratory infections.    Don t smoke, and stay away from secondhand smoke.    Stay up to date with of your vaccines.  [employer] last reviewed this educational content on 12/1/2019 2000-2021 [employer]. All rights reserved. This information is not intended as a substitute for professional medical care. Always follow your healthcare professional's instructions.

## 2021-10-03 ENCOUNTER — HEALTH MAINTENANCE LETTER (OUTPATIENT)
Age: 56
End: 2021-10-03

## 2021-11-18 ENCOUNTER — HOSPITAL ENCOUNTER (OUTPATIENT)
Dept: MAMMOGRAPHY | Facility: CLINIC | Age: 56
Discharge: HOME | End: 2021-11-18
Attending: NURSE PRACTITIONER | Admitting: NURSE PRACTITIONER
Payer: COMMERCIAL

## 2021-11-18 DIAGNOSIS — Z12.31 ENCOUNTER FOR SCREENING MAMMOGRAM FOR BREAST CANCER: ICD-10-CM

## 2021-11-18 PROCEDURE — 77063 BREAST TOMOSYNTHESIS BI: CPT

## 2022-01-01 ENCOUNTER — E-VISIT (OUTPATIENT)
Dept: URGENT CARE | Facility: CLINIC | Age: 57
End: 2022-01-01
Payer: COMMERCIAL

## 2022-01-01 DIAGNOSIS — J06.9 VIRAL URI: Primary | ICD-10-CM

## 2022-01-01 PROCEDURE — 99421 OL DIG E/M SVC 5-10 MIN: CPT | Performed by: EMERGENCY MEDICINE

## 2022-01-01 NOTE — PATIENT INSTRUCTIONS
Dear Elizabeth Santizo    After reviewing your responses, I've been able to diagnose you with?a sinus infection caused by a virus.?     Based on your responses and diagnosis, I have prescribed decongestants and Flonase spray.  Antibiotics are not indicated until symptoms persist for 7 to 10 days or symptoms worsen.  Please reconnect with us if symptoms persist for a full week.      It is also important to stay well hydrated, get lots of rest and take over-the-counter decongestants,?tylenol?or ibuprofen if you?are able to?take those medications per your primary care provider to help relieve discomfort.?     It is important that you take?all of?your prescribed medication even if your symptoms are improving after a few doses.? Taking?all of?your medicine helps prevent the symptoms from returning.?     If your symptoms worsen, you develop severe headache, vomiting, high fever (>102), or are not improving in 7 days, please contact your primary care provider for an appointment or visit any of our convenient Walk-in Care or Urgent Care Centers to be seen which can be found on our website?here.?     Thanks again for choosing?us?as your health care partner,?   ?  Van Stuart MD?

## 2022-01-07 ENCOUNTER — E-VISIT (OUTPATIENT)
Dept: URGENT CARE | Facility: CLINIC | Age: 57
End: 2022-01-07
Payer: COMMERCIAL

## 2022-01-07 DIAGNOSIS — B96.89 ACUTE BACTERIAL SINUSITIS: Primary | ICD-10-CM

## 2022-01-07 DIAGNOSIS — J01.90 ACUTE BACTERIAL SINUSITIS: Primary | ICD-10-CM

## 2022-01-07 PROCEDURE — 99421 OL DIG E/M SVC 5-10 MIN: CPT | Performed by: PHYSICIAN ASSISTANT

## 2022-01-07 RX ORDER — DOXYCYCLINE HYCLATE 100 MG
100 TABLET ORAL 2 TIMES DAILY
Qty: 14 TABLET | Refills: 0 | Status: SHIPPED | OUTPATIENT
Start: 2022-01-07 | End: 2022-01-14

## 2022-01-07 NOTE — PATIENT INSTRUCTIONS
When to Use Antibiotics    Antibiotics are medicines used to treat infections caused by bacteria. They don t work for an illness caused by a virus. And they don't work for an allergic reaction. In fact, taking antibiotics for reasons other than an infection by bacteria can cause problems. You may have side effects from the medicine. And if you need an antibiotic in the future, it may not work well. This is because the bacteria can become immune to the medicine. You can also get a type of diarrhea that's hard to treat. This diarrhea is called C. diff.   When antibiotics likely won t help  Your healthcare provider won t usually give you antibiotics for the conditions listed below. You can help by not asking for them if you have:     A cold. This type of illness is caused by a virus. It can cause a runny nose, stuffed-up nose, sneezing, coughing, and headache. You may also have mild body aches and low fever. A cold gets better on its own in a few days to a week.    The flu (influenza). This is a respiratory illness caused by a virus. The flu usually goes away on its own in a week or so. It can cause fever, body aches, sore throat, and tiredness.    Bronchitis. This is an infection in the lungs. It is most often caused by a virus. You may have coughing, phlegm, body aches, and a low fever. A common type of bronchitis is known as a chest cold. This is called acute bronchitis. This often happens after you have a respiratory infection like a cold. Bronchitis can take weeks to go away. Antibiotics often don t help.    Most sore throats. Sore throats are most often caused by viruses. Your throat may feel scratchy or achy. It may hurt to swallow. You may also have a low fever and body aches. A sore throat usually gets better in a few days.    Most outer ear infections. An ear infection may be caused by a virus or bacteria. It causes pain in the ear. Antibiotics by mouth usually don t help. Low-dose antibiotic ear drops  work much better.    Some inner ear infections. An inner ear infection (otitis media) can be caused by a virus in the ear. It can also cause pain and a high fever. Most older children with low-grade fever don't need to be treated with antibiotics.    Most sinus infections. This is also known as sinusitis. This kind of infection causes sinus pain and swelling, and a runny nose. In most cases, it goes away on its own. Antibiotics don t make recovery quicker.    Allergic rhinitis. This is a set of symptoms caused by an allergic reaction. You may have sneezing, a runny nose, itchy or watery eyes, or a sore throat. Allergies are not treated with antibiotics.    Low fever. A mild fever that s less than 100.4 F (38 C) most likely doesn t need to be treated with antibiotics.   When antibiotics can help  Antibiotics can be used to treat:                                                       Strep throat. This is a throat infection caused by a certain type of bacteria. Symptoms of strep throat include a sore throat, white patches on the tonsils, red spots on the roof of the mouth, fever, body aches, and nausea and vomiting. Strep throat almost never causes a cough.    Urinary tract infection (UTI). This is an infection of the bladder and the tube that takes urine out of the body. It is caused by bacteria. It can cause burning pain and urine that s cloudy or tinted with blood. UTIs are very common. Antibiotics usually help treat them.    Some outer ear infections. In some cases, a healthcare provider may prescribe antibiotics by mouth for an ear infection. You may need a test to show the cause of the ear infection.    Some sinus infections. In some cases, your healthcare provider may give you antibiotics. He or she may first need to make sure your symptoms aren t caused by something else. This may be a virus, fungus, allergies, or air pollutants such as smoke.   Your healthcare provider may give you antibiotics if you have a  condition that can affect your immune system. This includes diabetes or cancer.  Self-care at home  If your infection can t be treated with antibiotics, you can take other steps to feel better. Try the remedies below. In general:     Rest and sleep as much as needed.    Drink water and other clear fluids.    Don t smoke. Stay away from smoke from other people.    Use over-the-counter medicine such as acetaminophen or ibuprofen to ease pain or fever, as directed by your healthcare provider.  To treat sinus pain or nasal stuffiness:    Put a warm, moist cloth on your face where you feel sinus pain or pressure.    Try a nasal spray with medicine or saline. Use as directed by your healthcare provider.    Breathe in steam from a hot shower.    Use a humidifier or cool mist vaporizer.   To quiet a cough:     Use a humidifier or cool mist vaporizer.    Breathe in steam from a hot shower.    Suck on cough lozenges.   To sooth a sore throat:     Suck on ice chips, frozen ice pops, or lozenges.    Use a sore throat spray.    Use a humidifier or cool mist vaporizer.    Gargle with saltwater.    Drink warm liquids.    Take ibuprofen to reduce swelling and pain.  To ease ear pain:     Hold a warm, moist washcloth on the ear for 10 minutes at a time.  [employer] last reviewed this educational content on 12/1/2019 2000-2021 [employer]. All rights reserved. This information is not intended as a substitute for professional medical care. Always follow your healthcare professional's instructions.          Sinusitis (Antibiotic Treatment)    The sinuses are air-filled spaces within the bones of the face. They connect to the inside of the nose. Sinusitis is an inflammation of the tissue that lines the sinuses. Sinusitis can occur during a cold. It can also happen due to allergies to pollens and other particles in the air. Sinusitis can cause symptoms of sinus congestion and a feeling of fullness. A sinus infection causes  fever, headache, and facial pain. There is often green or yellow fluid draining from the nose or into the back of the throat (post-nasal drip). You have been given antibiotics to treat this condition.   Home care    Take the full course of antibiotics as instructed. Don't stop taking them, even when you feel better.    Drink plenty of water, hot tea, and other liquids as directed by the healthcare provider. This may help thin nasal mucus. It also may help your sinuses drain fluids.    Heat may help soothe painful areas of your face. Use a towel soaked in hot water. Or,  the shower and direct the warm spray onto your face. Using a vaporizer along with a menthol rub at night may also help soothe symptoms.     An expectorant with guaifenesin may help thin nasal mucus and help your sinuses drain fluids. Talk with your provider or pharmacists before taking an over-the-counter (OTC) medicine if you have any questions about it or its side effects..    You can use an OTC decongestant, unless a similar medicine was prescribed to you. Nasal sprays work the fastest. Use one that contains phenylephrine or oxymetazoline. First blow your nose gently. Then use the spray. Don't use these medicines more often than directed on the label. If you do, your symptoms may get worse. You may also take pills that contain pseudoephedrine. Don t use products that combine multiple medicines. This is because side effects may be increased. Read labels. You can also ask the pharmacist for help. (People with high blood pressure should not use decongestants. They can raise blood pressure.) Talk with your provider or pharmacist if you have any questions about the medicine..    OTC antihistamines may help if allergies contributed to your sinusitis. Talk with your provider or pharmacist if you have any questions about the medicine..    Don't use nasal rinses or irrigation during an acute sinus infection, unless your healthcare provider tells  you to. Rinsing may spread the infection to other areas in your sinuses.    Use acetaminophen or ibuprofen to control pain, unless another pain medicine was prescribed to you. If you have chronic liver or kidney disease or ever had a stomach ulcer, talk with your healthcare provider before using these medicines. Never give aspirin to anyone under age 18 who is ill with a fever. It may cause severe liver damage.    Don't smoke. This can make symptoms worse.    Follow-up care  Follow up with your healthcare provider, or as advised.   When to seek medical advice  Call your healthcare provider if any of these occur:     Facial pain or headache that gets worse    Stiff neck    Unusual drowsiness or confusion    Swelling of your forehead or eyelids    Symptoms don't go away in 10 days    Vision problems, such as blurred or double vision    Fever of 100.4 F (38 C) or higher, or as directed by your healthcare provider  Call 911  Call 911 if any of these occur:     Seizure    Trouble breathing    Feeling dizzy or faint    Fingernails, skin or lips look blue, purple , or gray  Prevention  Here are steps you can take to help prevent an infection:     Keep good hand washing habits.    Don t have close contact with people who have sore throats, colds, or other upper respiratory infections.    Don t smoke, and stay away from secondhand smoke.    Stay up to date with of your vaccines.  [employer] last reviewed this educational content on 12/1/2019 2000-2021 [employer]. All rights reserved. This information is not intended as a substitute for professional medical care. Always follow your healthcare professional's instructions.        Dear Elizabeth Santizo    After reviewing your responses, I've been able to diagnose you with?a sinus infection caused by bacteria.?     Based on your responses and diagnosis, I have prescribed doxycycline to treat your symptoms. I have sent this to your pharmacy.?     It is also  important to stay well hydrated, get lots of rest and take over-the-counter decongestants,?tylenol?or ibuprofen if you?are able to?take those medications per your primary care provider to help relieve discomfort.?     It is important that you take?all of?your prescribed medication even if your symptoms are improving after a few doses.? Taking?all of?your medicine helps prevent the symptoms from returning.?     If your symptoms worsen, you develop severe headache, vomiting, high fever (>102), or are not improving in 7 days, please contact your primary care provider for an appointment or visit any of our convenient Walk-in Care or Urgent Care Centers to be seen which can be found on our website?here.?     Thanks again for choosing?us?as your health care partner,?   ?  Pamela Eden PA-C?

## 2022-04-04 NOTE — TELEPHONE ENCOUNTER
I did copy and paste it into a mychart note also.     CSS, you can call her when you have the signed hard copy letter and ask her if she wants to pick it up or have you mail or fax it somewhere? Thanks!   Mayra HART     The patient is a 77y Male complaining of fever.

## 2022-05-06 ENCOUNTER — E-VISIT (OUTPATIENT)
Dept: URGENT CARE | Facility: CLINIC | Age: 57
End: 2022-05-06
Payer: COMMERCIAL

## 2022-05-06 DIAGNOSIS — B96.89 ACUTE BACTERIAL SINUSITIS: Primary | ICD-10-CM

## 2022-05-06 DIAGNOSIS — J01.90 ACUTE BACTERIAL SINUSITIS: Primary | ICD-10-CM

## 2022-05-06 PROCEDURE — 99421 OL DIG E/M SVC 5-10 MIN: CPT | Performed by: FAMILY MEDICINE

## 2022-05-06 RX ORDER — DOXYCYCLINE HYCLATE 100 MG
100 TABLET ORAL 2 TIMES DAILY
Qty: 14 TABLET | Refills: 0 | Status: SHIPPED | OUTPATIENT
Start: 2022-05-06 | End: 2022-05-13

## 2022-05-06 NOTE — PATIENT INSTRUCTIONS
Dear Elizabeth Santizo    After reviewing your responses, I've been able to diagnose you with?a sinus infection caused by bacteria.?     Based on your responses and diagnosis, I have prescribed doxycycline to treat your symptoms. I have sent this to your pharmacy.?     It is also important to stay well hydrated, get lots of rest and take over-the-counter decongestants,?tylenol?or ibuprofen if you?are able to?take those medications per your primary care provider to help relieve discomfort.?     It is important that you take?all of?your prescribed medication even if your symptoms are improving after a few doses.? Taking?all of?your medicine helps prevent the symptoms from returning.?     If your symptoms worsen, you develop severe headache, vomiting, high fever (>102), or are not improving in 7 days, please contact your primary care provider for an appointment or visit any of our convenient Walk-in Care or Urgent Care Centers to be seen which can be found on our website?here.?     Thanks again for choosing?us?as your health care partner,?   ?  Carlene Hitchcock MD?

## 2022-07-06 ASSESSMENT — ENCOUNTER SYMPTOMS
MYALGIAS: 1
PARESTHESIAS: 0
EYE PAIN: 0
ABDOMINAL PAIN: 0
JOINT SWELLING: 0
DIZZINESS: 0
NERVOUS/ANXIOUS: 0
DYSURIA: 0
HEADACHES: 0
HEARTBURN: 0
SHORTNESS OF BREATH: 0
HEMATURIA: 0
FEVER: 0
PALPITATIONS: 0
CHILLS: 0
COUGH: 0
WEAKNESS: 0
HEMATOCHEZIA: 0
SORE THROAT: 0
FREQUENCY: 0
CONSTIPATION: 0
BREAST MASS: 0
NAUSEA: 0
DIARRHEA: 0
ARTHRALGIAS: 0

## 2022-07-06 ASSESSMENT — PATIENT HEALTH QUESTIONNAIRE - PHQ9
SUM OF ALL RESPONSES TO PHQ QUESTIONS 1-9: 2
10. IF YOU CHECKED OFF ANY PROBLEMS, HOW DIFFICULT HAVE THESE PROBLEMS MADE IT FOR YOU TO DO YOUR WORK, TAKE CARE OF THINGS AT HOME, OR GET ALONG WITH OTHER PEOPLE: SOMEWHAT DIFFICULT
SUM OF ALL RESPONSES TO PHQ QUESTIONS 1-9: 2

## 2022-07-10 ENCOUNTER — HEALTH MAINTENANCE LETTER (OUTPATIENT)
Age: 57
End: 2022-07-10

## 2022-07-12 ENCOUNTER — OFFICE VISIT (OUTPATIENT)
Dept: FAMILY MEDICINE | Facility: CLINIC | Age: 57
End: 2022-07-12
Payer: COMMERCIAL

## 2022-07-12 VITALS
WEIGHT: 132.6 LBS | HEART RATE: 71 BPM | OXYGEN SATURATION: 99 % | TEMPERATURE: 97.8 F | HEIGHT: 61 IN | BODY MASS INDEX: 25.04 KG/M2 | DIASTOLIC BLOOD PRESSURE: 82 MMHG | SYSTOLIC BLOOD PRESSURE: 116 MMHG | RESPIRATION RATE: 20 BRPM

## 2022-07-12 DIAGNOSIS — R63.5 WEIGHT GAIN: ICD-10-CM

## 2022-07-12 DIAGNOSIS — F41.1 GAD (GENERALIZED ANXIETY DISORDER): ICD-10-CM

## 2022-07-12 DIAGNOSIS — Z00.00 ROUTINE GENERAL MEDICAL EXAMINATION AT A HEALTH CARE FACILITY: Primary | ICD-10-CM

## 2022-07-12 DIAGNOSIS — R73.03 PRE-DIABETES: ICD-10-CM

## 2022-07-12 DIAGNOSIS — F33.1 MAJOR DEPRESSIVE DISORDER, RECURRENT EPISODE, MODERATE (H): ICD-10-CM

## 2022-07-12 DIAGNOSIS — Z12.11 SCREEN FOR COLON CANCER: ICD-10-CM

## 2022-07-12 DIAGNOSIS — J30.2 SEASONAL ALLERGIC RHINITIS, UNSPECIFIED TRIGGER: ICD-10-CM

## 2022-07-12 DIAGNOSIS — E78.5 HYPERLIPIDEMIA WITH TARGET LDL LESS THAN 130: ICD-10-CM

## 2022-07-12 DIAGNOSIS — H91.92 HEARING LOSS OF LEFT EAR, UNSPECIFIED HEARING LOSS TYPE: ICD-10-CM

## 2022-07-12 DIAGNOSIS — K21.9 GASTROESOPHAGEAL REFLUX DISEASE WITHOUT ESOPHAGITIS: ICD-10-CM

## 2022-07-12 DIAGNOSIS — I10 ESSENTIAL HYPERTENSION WITH GOAL BLOOD PRESSURE LESS THAN 140/90: ICD-10-CM

## 2022-07-12 LAB
ANION GAP SERPL CALCULATED.3IONS-SCNC: 4 MMOL/L (ref 3–14)
BUN SERPL-MCNC: 17 MG/DL (ref 7–30)
CALCIUM SERPL-MCNC: 8.9 MG/DL (ref 8.5–10.1)
CHLORIDE BLD-SCNC: 109 MMOL/L (ref 94–109)
CHOLEST SERPL-MCNC: 208 MG/DL
CO2 SERPL-SCNC: 27 MMOL/L (ref 20–32)
CREAT SERPL-MCNC: 0.48 MG/DL (ref 0.52–1.04)
FASTING STATUS PATIENT QL REPORTED: YES
GFR SERPL CREATININE-BSD FRML MDRD: >90 ML/MIN/1.73M2
GLUCOSE BLD-MCNC: 109 MG/DL (ref 70–99)
HBA1C MFR BLD: 5.6 % (ref 0–5.6)
HDLC SERPL-MCNC: 50 MG/DL
LDLC SERPL CALC-MCNC: 114 MG/DL
NONHDLC SERPL-MCNC: 158 MG/DL
POTASSIUM BLD-SCNC: 4.1 MMOL/L (ref 3.4–5.3)
SODIUM SERPL-SCNC: 140 MMOL/L (ref 133–144)
TRIGL SERPL-MCNC: 222 MG/DL

## 2022-07-12 PROCEDURE — 80061 LIPID PANEL: CPT | Performed by: NURSE PRACTITIONER

## 2022-07-12 PROCEDURE — 90471 IMMUNIZATION ADMIN: CPT | Performed by: NURSE PRACTITIONER

## 2022-07-12 PROCEDURE — 90750 HZV VACC RECOMBINANT IM: CPT | Performed by: NURSE PRACTITIONER

## 2022-07-12 PROCEDURE — 80048 BASIC METABOLIC PNL TOTAL CA: CPT | Performed by: NURSE PRACTITIONER

## 2022-07-12 PROCEDURE — 83036 HEMOGLOBIN GLYCOSYLATED A1C: CPT | Performed by: NURSE PRACTITIONER

## 2022-07-12 PROCEDURE — 36415 COLL VENOUS BLD VENIPUNCTURE: CPT | Performed by: NURSE PRACTITIONER

## 2022-07-12 PROCEDURE — 99214 OFFICE O/P EST MOD 30 MIN: CPT | Mod: 25 | Performed by: NURSE PRACTITIONER

## 2022-07-12 PROCEDURE — 99396 PREV VISIT EST AGE 40-64: CPT | Mod: 25 | Performed by: NURSE PRACTITIONER

## 2022-07-12 RX ORDER — ATORVASTATIN CALCIUM 40 MG/1
40 TABLET, FILM COATED ORAL DAILY
Qty: 90 TABLET | Refills: 3 | Status: SHIPPED | OUTPATIENT
Start: 2022-07-12 | End: 2023-07-27

## 2022-07-12 RX ORDER — FLUTICASONE PROPIONATE 50 MCG
1 SPRAY, SUSPENSION (ML) NASAL DAILY
Qty: 16 G | Refills: 11 | Status: SHIPPED | OUTPATIENT
Start: 2022-07-12 | End: 2023-08-07

## 2022-07-12 RX ORDER — TRAZODONE HYDROCHLORIDE 50 MG/1
TABLET, FILM COATED ORAL
Qty: 90 TABLET | Refills: 3 | Status: SHIPPED | OUTPATIENT
Start: 2022-07-12 | End: 2023-08-07

## 2022-07-12 RX ORDER — METOPROLOL SUCCINATE 50 MG/1
50 TABLET, EXTENDED RELEASE ORAL DAILY
Qty: 90 TABLET | Refills: 3 | Status: SHIPPED | OUTPATIENT
Start: 2022-07-12 | End: 2023-07-27

## 2022-07-12 ASSESSMENT — ENCOUNTER SYMPTOMS
MYALGIAS: 1
HEMATURIA: 0
COUGH: 0
NERVOUS/ANXIOUS: 0
HEARTBURN: 0
CONSTIPATION: 0
BREAST MASS: 0
PALPITATIONS: 0
PARESTHESIAS: 0
DYSURIA: 0
ARTHRALGIAS: 0
EYE PAIN: 0
HEADACHES: 0
FREQUENCY: 0
FEVER: 0
DIZZINESS: 0
JOINT SWELLING: 0
SORE THROAT: 0
HEMATOCHEZIA: 0
NAUSEA: 0
ABDOMINAL PAIN: 0
SHORTNESS OF BREATH: 0
CHILLS: 0
WEAKNESS: 0
DIARRHEA: 0

## 2022-07-12 ASSESSMENT — PAIN SCALES - GENERAL: PAINLEVEL: MODERATE PAIN (4)

## 2022-07-12 NOTE — PROGRESS NOTES
SUBJECTIVE:   CC: Elizabeth Santizo is an 57 year old woman who presents for preventive health visit.       Patient has been advised of split billing requirements and indicates understanding: Yes  Healthy Habits:     Getting at least 3 servings of Calcium per day:  NO    Bi-annual eye exam:  Yes    Dental care twice a year:  Yes    Sleep apnea or symptoms of sleep apnea:  None    Diet:  Regular (no restrictions)    Frequency of exercise:  None    Taking medications regularly:  Yes    Barriers to taking medications:  Side effects    Medication side effects:  Muscle aches and Lightheadedness    PHQ-2 Total Score: 0    Additional concerns today:  Yes      Chief Complaint   Patient presents with     Physical     Left ear feels like wax in it  Can't hear out of left ear.       Health Maintenance     Dont want covid shot but will do shingle today , aware due for FIT test      Has gained weight this year.  Wants to talk to a dietician.    Wt Readings from Last 4 Encounters:   07/12/22 60.1 kg (132 lb 9.6 oz)   07/27/21 58.2 kg (128 lb 6.4 oz)   04/23/21 57.6 kg (127 lb)   03/06/20 56.7 kg (125 lb)           Hyperlipidemia Follow-Up      Are you regularly taking any medication or supplement to lower your cholesterol?   Yes- PM    Are you having muscle aches or other side effects that you think could be caused by your cholesterol lowering medication?  Yes- all over muscle aches    Hypertension Follow-up      Do you check your blood pressure regularly outside of the clinic? No     Are you following a low salt diet? Yes    Are your blood pressures ever more than 140 on the top number (systolic) OR more   than 90 on the bottom number (diastolic), for example 140/90? No    Depression and Anxiety Follow-Up    How are you doing with your depression since your last visit? Improved     How are you doing with your anxiety since your last visit?  Improved     Are you having other symptoms that might be associated with depression  or anxiety? Yes:  poor sleep    Have you had a significant life event? Job Concerns     Do you have any concerns with your use of alcohol or other drugs? No    Social History     Tobacco Use     Smoking status: Former Smoker     Packs/day: 0.30     Years: 9.00     Pack years: 2.70     Types: Cigarettes     Start date: 2001     Quit date: 2/15/2010     Years since quittin.4     Smokeless tobacco: Never Used   Vaping Use     Vaping Use: Never used   Substance Use Topics     Alcohol use: Yes     Alcohol/week: 3.3 standard drinks     Comment: socially     Drug use: No     PHQ 2020   PHQ-9 Total Score 0 1 2   Q9: Thoughts of better off dead/self-harm past 2 weeks Not at all Not at all Not at all     PATRICIA-7 SCORE 3/23/2018 2018 2018   Total Score 8 9 8         Medication Followup of:  Omeprazole for GERD - taking as needed  Flonase for allergies - taking as needed    Taking Medication as prescribed: yes    Side Effects:  None    Medication Helping Symptoms:  yes         Today's PHQ-2 Score:   PHQ-2 (  [employer]) 2022   Q1: Little interest or pleasure in doing things 0   Q2: Feeling down, depressed or hopeless 0   PHQ-2 Score 0   PHQ-2 Total Score (12-17 Years)- Positive if 3 or more points; Administer PHQ-A if positive -   Q1: Little interest or pleasure in doing things Not at all   Q2: Feeling down, depressed or hopeless Not at all   PHQ-2 Score 0       Abuse: Current or Past (Physical, Sexual or Emotional) - No  Do you feel safe in your environment? Yes        Social History     Tobacco Use     Smoking status: Former Smoker     Packs/day: 0.30     Years: 9.00     Pack years: 2.70     Types: Cigarettes     Start date: 2001     Quit date: 2/15/2010     Years since quittin.4     Smokeless tobacco: Never Used   Substance Use Topics     Alcohol use: Yes     Alcohol/week: 3.3 standard drinks     Comment: socially     If you drink alcohol do you typically have >3 drinks  per day or >7 drinks per week? No      Reviewed orders with patient.  Reviewed health maintenance and updated orders accordingly - Yes      Breast Cancer Screening:    Breast CA Risk Assessment (FHS-7) 4/22/2021   Do you have a family history of breast, colon, or ovarian cancer? No / Unknown     Mammogram Screening: Recommended annual mammography  Pertinent mammograms are reviewed under the imaging tab.    History of abnormal Pap smear: YES - updated in Problem List and Health Maintenance accordingly  PAP / HPV Latest Ref Rng & Units 3/6/2020 7/21/2017   PAP (Historical) - NIL NIL   HPV16 NEG:Negative Negative Negative   HPV18 NEG:Negative Negative Negative   HRHPV NEG:Negative Negative Negative     Reviewed and updated as needed this visit by clinical staff   Tobacco  Allergies  Meds   Med Hx  Surg Hx  Fam Hx  Soc Hx          Reviewed and updated as needed this visit by Provider                       Review of Systems   Constitutional: Negative for chills and fever.   HENT: Negative for congestion, ear pain, hearing loss and sore throat.    Eyes: Negative for pain and visual disturbance.   Respiratory: Negative for cough and shortness of breath.    Cardiovascular: Negative for chest pain, palpitations and peripheral edema.   Gastrointestinal: Negative for abdominal pain, constipation, diarrhea, heartburn, hematochezia and nausea.   Breasts:  Negative for tenderness, breast mass and discharge.   Genitourinary: Negative for dysuria, frequency, genital sores, hematuria, pelvic pain, urgency, vaginal bleeding and vaginal discharge.   Musculoskeletal: Positive for myalgias. Negative for arthralgias and joint swelling.   Skin: Negative for rash.   Neurological: Negative for dizziness, weakness, headaches and paresthesias.   Psychiatric/Behavioral: Negative for mood changes. The patient is not nervous/anxious.           OBJECTIVE:   /82 (BP Location: Right arm, Patient Position: Sitting, Cuff Size: Adult  "Regular)   Pulse 71   Temp 97.8  F (36.6  C) (Tympanic)   Resp 20   Ht 1.54 m (5' 0.63\")   Wt 60.1 kg (132 lb 9.6 oz)   LMP 09/23/2018   SpO2 99%   BMI 25.36 kg/m    Physical Exam  GENERAL: healthy, alert and no distress  EYES: Eyes grossly normal to inspection, PERRL and conjunctivae and sclerae normal  HENT: ear canals and TM's normal, nose and mouth without ulcers or lesions  NECK: no adenopathy, no asymmetry, masses, or scars and thyroid normal to palpation  RESP: lungs clear to auscultation - no rales, rhonchi or wheezes  CV: regular rate and rhythm, normal S1 S2, no S3 or S4, no murmur, click or rub, no peripheral edema and peripheral pulses strong  ABDOMEN: soft, nontender, no hepatosplenomegaly, no masses and bowel sounds normal  MS: no gross musculoskeletal defects noted, no edema  NEURO: Normal strength and tone, mentation intact and speech normal  PSYCH: mentation appears normal, affect normal/bright        ASSESSMENT/PLAN:       ICD-10-CM    1. Routine general medical examination at a health care facility    Z00.00    2. Major depressive disorder, recurrent episode, moderate (H)  F33.1 Well controlled.  traZODone (DESYREL) 50 MG tablet     3. PATRICIA (generalized anxiety disorder)  F41.1 Well controlled.  traZODone (DESYREL) 50 MG tablet     4. Hyperlipidemia with target LDL less than 130  E78.5 Continue atorvastatin (LIPITOR) 40 MG tablet daily     Recheck labs today: Lipid panel reflex to direct LDL Fasting     Lipid panel reflex to direct LDL Fasting     5. Essential hypertension with goal blood pressure less than 140/90  I10 Well controlled.  Continue metoprolol succinate ER (TOPROL XL) 50 MG 24 hr tablet     Basic metabolic panel  (Ca, Cl, CO2, Creat, Gluc, K, Na, BUN)     Basic metabolic panel  (Ca, Cl, CO2, Creat, Gluc, K, Na, BUN)     6. Gastroesophageal reflux disease without esophagitis  K21.9 Well controlled.  Continued omeprazole (PRILOSEC) 20 MG DR capsule     7. Seasonal allergic " "rhinitis, unspecified trigger  J30.2 Well controlled.  Continue fluticasone (FLONASE) 50 MCG/ACT nasal spray     8. Pre-diabetes  R73.03 Recheck Hemoglobin A1c     Hemoglobin A1c     9. Weight gain  R63.5 Nutrition Referral     10. Hearing loss of left ear, unspecified hearing loss type  H91.92 Adult Audiology  Referral     Adult ENT  Referral     11. Screen for colon cancer  Z12.11 Fecal colorectal cancer screen FIT - Future (S+30)       Patient has been advised of split billing requirements and indicates understanding: Yes by AFR and CMA.    COUNSELING:  Reviewed preventive health counseling, as reflected in patient instructions    Estimated body mass index is 25.36 kg/m  as calculated from the following:    Height as of this encounter: 1.54 m (5' 0.63\").    Weight as of this encounter: 60.1 kg (132 lb 9.6 oz).    Weight management plan: Discussed healthy diet and exercise guidelines    She reports that she quit smoking about 12 years ago. Her smoking use included cigarettes. She started smoking about 20 years ago. She has a 2.70 pack-year smoking history. She has never used smokeless tobacco.    The risks, benefits and treatment options of prescribed medications or other treatments have been discussed with the patient. The patient verbalized their understanding and should call or follow up if no improvement or if they develop further problems.    SOLANGE Wall [facility]  Answers for HPI/ROS submitted by the patient on 7/6/2022  If you checked off any problems, how difficult have these problems made it for you to do your work, take care of things at home, or get along with other people?: Somewhat difficult  PHQ9 TOTAL SCORE: 2      "

## 2022-08-16 ENCOUNTER — VIRTUAL VISIT (OUTPATIENT)
Dept: NUTRITION | Facility: CLINIC | Age: 57
End: 2022-08-16
Attending: NURSE PRACTITIONER
Payer: COMMERCIAL

## 2022-08-16 DIAGNOSIS — R63.5 WEIGHT GAIN: ICD-10-CM

## 2022-08-16 PROCEDURE — 97802 MEDICAL NUTRITION INDIV IN: CPT | Mod: TEL | Performed by: DIETITIAN, REGISTERED

## 2022-08-16 NOTE — PROGRESS NOTES
Medical Nutrition Therapy  Visit Type:Initial assessment and intervention    Type of Service: Telephone Visit    Originating Location (Patient Location): Home  Distant Location (Provider Location): Home  Mode of Communication:  Telephone    Telephone Visit Start Time: 10:06 am  Telephone Visit End Time (telephone visit stop time): 10:52    How would patient like to obtain AVS? Kourtney      Elizabeth Santizo presents today for MNT and education related to prediabetes, weight management and high cholesterol.     She is accompanied by self.     ASSESSMENT:   Patient comments/concerns relating to nutrition: Elizabeth states that her overall goal is to lose weight, would like to lose 10-15 pounds. Would like to decrease cholesterol and prevent diabetes. She has lost weight in the past, but usually gains back    NUTRITION HISTORY:    Breakfast: wake up at 4:45 am: toast- butter or avocado, egg, gunter OR oatmeal- sugar free brown sugar, cinnamon, a little bit of milk or cream - drink coffee (1-2 cups - black) and water   Lunch: Salad - natasha lettuce and bagged salad- add cucumbers, onion with salad dressing (2Tbsp), will sometimes prep chicken, etc OR sandwich - 1/2 (1 slice bread, ham and salami OR tuna OR chicken, with lettuce and ribera), sometimes with chips, or celery and carrot sticks   Dinner: vary quite a bit- husbands used to be on nights, would have something quick- ramen noodles, frozen pot pie, mac and cheese. Now  switched to days- will be able to make healthier meals- chicken stir reed  Snacks: might go to vending machine- chips, cookies states that snacks are downfall, oranges, cheese sticks, peppers   Beverages: Water- usually 32-64 oz/day, 2 coffee in the morning    Misses meals? Sometimes skips breakfast  Eats out:  1-2 meals/per week (veronica zhao)    Previous diet education:  No - tried keto diet on her own (4-5 years ago), tried Nutrisystem (lost 14 pounds)    Food allergies/intolerances: Soy  bothers her stomach    EXERCISE: has been struggling with exercise, works as an - sits at desk for 9.5 hours/day. Away from home for 12-13 hours/day. They are very short staffed at work which leads to the long hours. Now she is working so much that she doesn't have time right now. They hired 1-2 people which will give her a break from the long hours. She is more active on the weekends, have a camper that they go to, taking dogs on walks.     SOCIO/ECONOMIC:   Lives with: self and spouse    MEDICATIONS:  Current Outpatient Medications   Medication     Ascorbic Acid (VITAMIN C PO)     atorvastatin (LIPITOR) 40 MG tablet     Cyanocobalamin (B-12) 100 MCG TABS     fluticasone (FLONASE) 50 MCG/ACT nasal spray     metoprolol succinate ER (TOPROL XL) 50 MG 24 hr tablet     Omega-3 Fatty Acids (OMEGA 3 PO)     omeprazole (PRILOSEC) 20 MG DR capsule     traZODone (DESYREL) 50 MG tablet     No current facility-administered medications for this visit.       LABS:  Last Basic Metabolic Panel:  Lab Results   Component Value Date     07/12/2022     04/23/2021      Lab Results   Component Value Date    POTASSIUM 4.1 07/12/2022    POTASSIUM 3.8 04/23/2021     Lab Results   Component Value Date    CHLORIDE 109 07/12/2022    CHLORIDE 104 04/23/2021     Lab Results   Component Value Date    WILIAN 8.9 07/12/2022    WILIAN 9.8 04/23/2021     Lab Results   Component Value Date    CO2 27 07/12/2022    CO2 27 04/23/2021     Lab Results   Component Value Date    BUN 17 07/12/2022    BUN 12 04/23/2021     Lab Results   Component Value Date    CR 0.48 07/12/2022    CR 0.62 04/23/2021     Lab Results   Component Value Date     07/12/2022    GLC 99 04/23/2021       ANTHROPOMETRICS:  Vitals: LMP 09/23/2018   There is no height or weight on file to calculate BMI.      Wt Readings from Last 5 Encounters:   07/12/22 60.1 kg (132 lb 9.6 oz)   07/27/21 58.2 kg (128 lb 6.4 oz)   04/23/21 57.6 kg (127 lb)   03/06/20 56.7 kg (125  lb)   05/14/19 60.2 kg (132 lb 12.8 oz)       Weight Change: has increased slightly over the last year    Weight goal: losing 10-15 pounds. Would like to get down to 120 lb.    NUTRITION DIAGNOSIS: Food- and nutrition-related knowledge deficit related to limited previous nutrition education as evidenced by patient statement and new referral    NUTRITION INTERVENTION:  Education given to support: general nutrition guidelines, weight reduction, consistent meals, carb counting, labeling, fat modification, exercise, dining out/special occasions, fiber, behavior modification, portion control and heart healthy diet  Education Materials Provided: My Plate Planner/Choose My Plate, Heart Health Guidelines, Heart Healthy Food Choices and Carbohydrate Counting  Motivational Interviewing    PATIENT'S BEHAVIOR CHANGE GOALS:   See Patient Instructions for patient stated behavior change goals. AVS was printed and given to patient at today's appointment.    1) Use plate planner for meals, include protein+carb with breakfast and protein+carb+vegetables with lunch and dinner  2) Add cardio and strength training on the weekends  3) Use mindful eating- listening to hunger/fullness cues    MONITOR / EVALUATE:  RD will monitor/evaluate:  Progress toward meeting stated nutrition-related goals  Readiness to change nutrition-related behaviors  Weight change    FOLLOW-UP:  Call RD with questions/concerns.   Follow-up appointment scheduled on 10/11.     AMBROSE Wilburn  Time spent in minutes: 46  Encounter: Individual

## 2022-08-16 NOTE — PATIENT INSTRUCTIONS
Goals:  1) Use plate planner for meals, include protein+carb with breakfast and protein+carb+vegetables with lunch and dinner  2) Add cardio and strength training on the weekends  3) Use mindful eating- listening to hunger/fullness cues      Websites with healthy recipes:  -diabetesfoodhub.org  Fitfoodiefinds.com  Heart.org  Sample 3 Carb Breakfast Choices- Carbohydrate choices found in (  )    cup cooked cereal (1.5)  1 cup blueberries (1)  4 oz skim milk (0.5)  2 tablespoons of nuts (0)   1 whole grain English muffin (2)    cup mixed fruit (1)  1 boiled egg (0) 6 oz light yogurt (1)  1 small orange (1)  1 slice whole grain toast (1)  1 slice low fat cheese (0)    1 small whole grain tortilla (1)  1 cup skim milk (1)     banana (1)  1 Tablespoon peanut butter (0)   8 oz skim milk (1) + 1 packet of instant breakfast mix (2)   2 slices whole grain toast (2)   1 cup skim milk (1)  1 scrambled egg (0) 1 cup cubed sweet potatoes (2)  1 scrambled egg  (0)  Vegetables (0)  1 cup skim milk (1)   1 cup of skim milk (1)  1 medium banana (2)  1-2 tablespoons of peanut butter  (0)     3 Carbohydrate Choice Sample Meal Plans for Lunch or Supper   Carbohydrate choices found in (   )  2/3 cup whole grain spaghetti noodles (2) +    cup low sodium pasta sauce (1)+ 4 oz lean ground beef or turkey (0) +  2 cups lettuce + veggies (0) + 1-2 tsbp salad dressing (0) 2 slices of whole grain bread (2)  3 oz lean turkey (0) + lettuce/tomato (0)  2 tsp ribera (0)  1 small apple (1) 1 cup of low sodium broth based soup (1) +  2 slices of whole grain bread (2)   2 oz of low fat cheese (0) + 1 tsp butter (0)  Carrots/celery (0) 2 cups lettuce salad (0) +   cup garbanzo beans (1) + +   cup tuna (0) + 2 Tablespoons low-fat vinaigrette salad dressing (0) +   cup grapes (1) + 6 oz light yogurt (1)   1 cup low sodium broth based soup (1) + 6 saltine crackers (1) + 1 small apple (1) + broccoli/cauliflower and low fat dip (0)   3 oz lean steak (0) + 1  small 3 oz baked potato (1) + 1 tsp low fat sour cream (0) + 1 cup green beans (0) + 1 small dinner roll (1)   1 cup berries (1) + 1 tbsp light whipped cream (0)  2 cups lettuce salad (0)   3 oz grilled chicken (0)  1-2 Tsbp light Caesar dressing (0)  + 1 Tbps grated cheese (0)    cup grapes (1)  5 Triscuit crackers (1)  8 oz skim milk (1)   3 oz chicken (0)  1 cup sweet potato (2)  1 cup asparagus (0)  1 small apple (1)  16 oz sparkling water (unsweetened)   1 hamburger kimi (0) on hamburger bun (2) + 1/2 order of small reed (1) + + 1 garden salad (0) with 1 package of vinaigrette (0) + 1 cup baby carrots + 1/2 cup green beans-cooked (0)  2 slices whole grain bread (2)+ 2 oz lean ham (0) + leaf lettuce/tomato/onion (0)  2 teaspoons ribera (0)  1 small pear (1)  1 cup raw veggie sticks (0) 2 slices thin crust pizza (2) + 2 cups lettuce salad (0) + 10 cherry tomatoes (0) + 2 Tbsp light salad dressing (0) + 1 small peach (1) 2 small whole grain tortillas (2) +   cup fat free refried beans (1) + 3 oz shredded lean beef (0) + 2 Tsbp salsa (0)+ lettuce/tomato (0) + 1 Tbsp light sour cream (0)   1 cup low sodium chicken noodle soup (1) + 2 slices whole grain bread (2) + 2 oz lean turkey (0) + 1 oz low fat cheese (0) + 1-2 teaspoons of butter or margarine  3 oz turkey (0) + 1 cup mashed potatoes (2) + 1 tsp butter (0) + 1 cup green beans (0) +   cup unsweetened apple sauce (1) 3 oz chicken + 2 medium pierogis (2) + 1/3 cup cabbage (0) +   cup grapes (1) +   cup green beans (0) I cup of beef stroganoff (0) + 2/3 cup egg noodles (2) + 1 cup broccoli (0) + 1 cup carrots (0) + 1 cup of watermelon (1)     Healthy snacks:  - Anna Bar  - fruit/vegetable pouch  - nuts (1/4 cup)  - fresh fruits/vegetables  - Greek yogurt    Snack Ideas for your Meal Plan     Protein (1 serving = 6-8 grams of protein each)  Beef Jerky ( 2 pieces)  Beef Sticks, plain (1 stick)  Cheese/Cheese Stick (1 oz)  Chicken breast (1 oz)  Cottage cheese, low fat  (1/4 cup)  Crab, Imitation (2 oz)  Deli Meat (2 oz)  Edamame, boiled (1/2 cup)  Egg, hardboiled (1)  Shrimp, small (10 pieces)   Turkey Breast (1 oz)  Tuna, canned in water (1 oz)  Fairlife Milk (1/2 cup)  Yogurt, Greek : Siggis, Oikos Triple Zero, Dannon Light and Fit, etc (6 oz)    Carb (1 carb choice/serving = 15 grams)   Apple (medium)  Applesauce, unsweetened (1/2 cup)  Apricots, dried (4 whole)  Banana, medium (1/2)  Bread, 1 slice   Cantaloupe/Melon (1 cup)  Crackers 4-6 (varies by brand)  Cherries (15)  Cranberries, Dried (2 tbsp)  Dark Chocolate (1 oz)  Dates, dried (2-3 pieces)  Fruit cocktail, in own juice (1/2 cup)  Granola Bar (vary by brand)  Grapes (1/2 cup)  Ice cream, slow churned/low fat (1/2 cup)  Kiwi (~2)  Mixed Berries (1 cup)  Orange (1 medium)  Peach (1 medium)  Pear (1/2 medium)  Plums (2)  Pomegranate (1 small)  Popcorn, stove popped (2 cups)  Pretzels (3/4 oz)  Pudding, sugar free (1/2 cup)  Raisins (2 Tbsp)  Rice Cake, (2)  Skim or 1% milk (1 cup)  Tortilla Chips (1 oz)  Yogurt (greek or plain)   cup    Fat (1 serving = 100 calories)  Almonds (2 Tbsp)  Avocado (1/3 fruit OR 3 Tbsp)  Cashews (11 whole)  Cheese (1 oz)  Chocolate, dark (3/4 oz)  Coconut, unsweetened (1/3 c shredded)  Hummus (3 Tbsp)  Peanuts (20 pieces)  Peanut/Nut Butter (1 Tbsp)  Pecans (10 halves)  Pumpkin seeds, unshelled (2 Tbsp)  Salad dressing  (1-2 Tbsp)  Sunflower seeds (2 Tbsp)  Vegetable Dip (1-2 Tbsp)  Walnuts (8 halves)    Free Foods  Bell Peppers  Broccoli  Carrots   Cauliflower   Celery  Coffee, black (regular or decaf)  Cucumber  Gelatin, Sugar Free  Herbal Tea, unsweetened   Pea Pods  Pickles (high in sodium)   Popsicle, Sugar Free  Salsa (2 Tbsp)  Tomatoes    Combination Snacks  Apple + 1 Tbsp peanut butter (carbohydrate + fat)  Handful crackers + 1 oz cheese (carbohydrate + fat or protein)  Carrot sticks + Hummus (free food + fat)  1 piece of peanut butter toast (carbohydrate + fat)  Greek yogurt + 2 Tbsp  sunflower seeds (carbohydrate + fat)  Hard-boiled egg +   cup grapes (protein + carbohydrate)  1 piece of avocado toast (carbohydrate + fat)  Cucumbers + dip (free food + fat)

## 2022-09-02 ENCOUNTER — TELEPHONE (OUTPATIENT)
Dept: FAMILY MEDICINE | Facility: CLINIC | Age: 57
End: 2022-09-02

## 2022-09-02 NOTE — TELEPHONE ENCOUNTER
Reason for Call: Request for an order or referral:    Order or referral being requested: 4TH DOSE COVID VACCINE     Date needed: at your convenience    Has the patient been seen by the PCP for this problem? Not Applicable    Additional comments: PATIENT WOULD LIKE TO GET A 4TH DOSE OF THE COVID VACCINE, BUT IS NOT IMMUNOCOMPROMISED AND CANNOT SCHEDULE BASED ON DECISION TREE. IS IT POSSIBLE FOR PCP TO PUT IN AN ORDER?     Phone number Patient can be reached at:  Home number on file [phone] (home)    Best Time:  ANYTIME     Can we leave a detailed message on this number?  YES    Call taken on 9/2/2022 at 9:11 AM by Alisa Tejeda

## 2022-09-06 NOTE — TELEPHONE ENCOUNTER
Please notify patient of the new booster coming out and all other booster are no longer recommended.  Anticipated date will be 9/15/22 for them to arrive in clinic.  Thank you,  Jak Greene MD

## 2022-09-08 ENCOUNTER — ALLIED HEALTH/NURSE VISIT (OUTPATIENT)
Dept: FAMILY MEDICINE | Facility: CLINIC | Age: 57
End: 2022-09-08
Payer: COMMERCIAL

## 2022-09-08 DIAGNOSIS — Z23 ENCOUNTER FOR IMMUNIZATION: Primary | ICD-10-CM

## 2022-09-08 PROCEDURE — 90750 HZV VACC RECOMBINANT IM: CPT

## 2022-09-08 PROCEDURE — 99207 PR NO CHARGE NURSE ONLY: CPT

## 2022-09-08 PROCEDURE — 90471 IMMUNIZATION ADMIN: CPT

## 2022-09-08 NOTE — PROGRESS NOTES
Prior to immunization administration, verified patients identity using patient s name and date of birth. Please see Immunization Activity for additional information.     Screening Questionnaire for Adult Immunization    Are you sick today?   No   Do you have allergies to medications, food, a vaccine component or latex?   No   Have you ever had a serious reaction after receiving a vaccination?   No   Do you have a long-term health problem with heart, lung, kidney, or metabolic disease (e.g., diabetes), asthma, a blood disorder, no spleen, complement component deficiency, a cochlear implant, or a spinal fluid leak?  Are you on long-term aspirin therapy?   No   Do you have cancer, leukemia, HIV/AIDS, or any other immune system problem?   No   Do you have a parent, brother, or sister with an immune system problem?   No   In the past 3 months, have you taken medications that affect  your immune system, such as prednisone, other steroids, or anticancer drugs; drugs for the treatment of rheumatoid arthritis, Crohn s disease, or psoriasis; or have you had radiation treatments?   No   Have you had a seizure, or a brain or other nervous system problem?   No   During the past year, have you received a transfusion of blood or blood    products, or been given immune (gamma) globulin or antiviral drug?   No   For women: Are you pregnant or is there a chance you could become       pregnant during the next month?   No   Have you received any vaccinations in the past 4 weeks?   No     Immunization questionnaire answers were all negative.       Patient instructed to remain in clinic for 15 minutes afterwards, and to report any adverse reaction to me immediately.       Screening performed by Zach Glasgow on 9/8/2022 at 8:26 AM.

## 2022-09-10 ENCOUNTER — HEALTH MAINTENANCE LETTER (OUTPATIENT)
Age: 57
End: 2022-09-10

## 2022-09-15 ENCOUNTER — TELEPHONE (OUTPATIENT)
Dept: FAMILY MEDICINE | Facility: CLINIC | Age: 57
End: 2022-09-15

## 2022-10-25 ENCOUNTER — E-VISIT (OUTPATIENT)
Dept: URGENT CARE | Facility: CLINIC | Age: 57
End: 2022-10-25
Payer: COMMERCIAL

## 2022-10-25 DIAGNOSIS — B96.89 ACUTE BACTERIAL SINUSITIS: Primary | ICD-10-CM

## 2022-10-25 DIAGNOSIS — J01.90 ACUTE BACTERIAL SINUSITIS: Primary | ICD-10-CM

## 2022-10-25 PROCEDURE — 99421 OL DIG E/M SVC 5-10 MIN: CPT | Performed by: EMERGENCY MEDICINE

## 2022-10-25 RX ORDER — DOXYCYCLINE HYCLATE 100 MG
100 TABLET ORAL 2 TIMES DAILY
Qty: 14 TABLET | Refills: 0 | Status: SHIPPED | OUTPATIENT
Start: 2022-10-25 | End: 2022-11-01

## 2022-10-25 NOTE — PATIENT INSTRUCTIONS
Dear Elizabeth Santizo    After reviewing your responses, I've been able to diagnose you with?a sinus infection caused by bacteria.?     Based on your responses and diagnosis, I have prescribed doxycycline to treat your symptoms. I have sent this to your pharmacy.?     It is also important to stay well hydrated, get lots of rest and take over-the-counter decongestants,?tylenol?or ibuprofen if you?are able to?take those medications per your primary care provider to help relieve discomfort.?     It is important that you take?all of?your prescribed medication even if your symptoms are improving after a few doses.? Taking?all of?your medicine helps prevent the symptoms from returning.?     If your symptoms worsen, you develop severe headache, vomiting, high fever (>102), or are not improving in 7 days, please contact your primary care provider for an appointment or visit any of our convenient Walk-in Care or Urgent Care Centers to be seen which can be found on our website?here.?     Thanks again for choosing?us?as your health care partner,?   ?  Van Stuart MD?

## 2022-11-13 ENCOUNTER — E-VISIT (OUTPATIENT)
Dept: URGENT CARE | Facility: CLINIC | Age: 57
End: 2022-11-13
Payer: COMMERCIAL

## 2022-11-13 DIAGNOSIS — J01.01 ACUTE RECURRENT MAXILLARY SINUSITIS: Primary | ICD-10-CM

## 2022-11-13 PROCEDURE — 99207 PR NON-BILLABLE SERV PER CHARTING: CPT | Performed by: NURSE PRACTITIONER

## 2022-11-14 NOTE — PATIENT INSTRUCTIONS
Dear Elizabeth Santizo,    We are sorry you are not feeling well. Based on the responses you provided, it is recommended that you be seen in-person in urgent care so we can better evaluate your symptoms. Please click here to find the nearest urgent care location to you.   You will not be charged for this Visit. Thank you for trusting us with your care.    SOLANGE Velazquez CNP

## 2022-12-15 ENCOUNTER — TELEPHONE (OUTPATIENT)
Dept: FAMILY MEDICINE | Facility: CLINIC | Age: 57
End: 2022-12-15

## 2022-12-15 NOTE — TELEPHONE ENCOUNTER
Patient Quality Outreach    Patient is due for the following:   Colon Cancer Screening    Next Steps:   decide on colon cancer screening type/     Type of outreach:    Sent letter.      Questions for provider review:    None     Colleen Del Valle, CMA

## 2022-12-15 NOTE — LETTER
December 15, 2022    To  Elizabeth Santizo  [id number] [place] [address]    Your team at [facility] cares about your health. We have reviewed your chart and based on our findings; we are making the following recommendations to better manage your health.     You are in particular need of attention regarding the following:     Call or MyChart message your clinic to schedule a colonoscopy, schedule/ a FIT Test, or order a Cologuard test. If you are unsure what type of test you need, please call your clinic and speak to clinic staff.   Colon cancer is now the second leading cause of cancer-related deaths in the [place] for both men and women and there are over 130,000 new cases and 50,000 deaths per year from colon cancer. Colonoscopies can prevent 90-95% of these deaths. Problem lesions can be removed before they ever become cancer. This test is not only looking for cancer, but also getting rid of precancerous lesions.   If you are under/uninsured, we recommend you contact the [employer] Program.[employer] is a free colorectal cancer screening program that provides colonoscopies for eligible under/uninsured [place] men and women. If you are interested in receiving a free colonoscopy, please call [employer] at t [phone] (mention code [employer]) to see if you're eligible. Please have them send us the results.     If you have already completed these items, please contact the clinic via phone or   [facility] so your care team can review and update your records. Thank you for   choosing [facility] for your healthcare needs. For any questions,   concerns, or to schedule an appointment please contact our clinic.    Healthy Regards,      Your [facility] Care Team

## 2022-12-16 ENCOUNTER — E-VISIT (OUTPATIENT)
Dept: FAMILY MEDICINE | Facility: CLINIC | Age: 57
End: 2022-12-16
Payer: COMMERCIAL

## 2022-12-16 DIAGNOSIS — J01.90 ACUTE BACTERIAL SINUSITIS: Primary | ICD-10-CM

## 2022-12-16 DIAGNOSIS — B96.89 ACUTE BACTERIAL SINUSITIS: Primary | ICD-10-CM

## 2022-12-16 PROCEDURE — 99421 OL DIG E/M SVC 5-10 MIN: CPT | Performed by: FAMILY MEDICINE

## 2022-12-16 RX ORDER — LEVOFLOXACIN 500 MG/1
500 TABLET, FILM COATED ORAL DAILY
Qty: 7 TABLET | Refills: 0 | Status: SHIPPED | OUTPATIENT
Start: 2022-12-16 | End: 2022-12-23

## 2022-12-16 NOTE — PATIENT INSTRUCTIONS
Sinusitis (Antibiotic Treatment)    The sinuses are air-filled spaces within the bones of the face. They connect to the inside of the nose. Sinusitis is an inflammation of the tissue that lines the sinuses. Sinusitis can occur during a cold. It can also happen due to allergies to pollens and other particles in the air. Sinusitis can cause symptoms of sinus congestion and a feeling of fullness. A sinus infection causes fever, headache, and facial pain. There is often green or yellow fluid draining from the nose or into the back of the throat (post-nasal drip). You have been given antibiotics to treat this condition.   Home care    Take the full course of antibiotics as instructed. Don't stop taking them, even when you feel better.    Drink plenty of water, hot tea, and other liquids as directed by the healthcare provider. This may help thin nasal mucus. It also may help your sinuses drain fluids.    Heat may help soothe painful areas of your face. Use a towel soaked in hot water. Or,  the shower and direct the warm spray onto your face. Using a vaporizer along with a menthol rub at night may also help soothe symptoms.     An expectorant with guaifenesin may help thin nasal mucus and help your sinuses drain fluids. Talk with your provider or pharmacists before taking an over-the-counter (OTC) medicine if you have any questions about it or its side effects..    You can use an OTC decongestant, unless a similar medicine was prescribed to you. Nasal sprays work the fastest. Use one that contains phenylephrine or oxymetazoline. First blow your nose gently. Then use the spray. Don't use these medicines more often than directed on the label. If you do, your symptoms may get worse. You may also take pills that contain pseudoephedrine. Don t use products that combine multiple medicines. This is because side effects may be increased. Read labels. You can also ask the pharmacist for help. (People with high blood  pressure should not use decongestants. They can raise blood pressure.) Talk with your provider or pharmacist if you have any questions about the medicine..    OTC antihistamines may help if allergies contributed to your sinusitis. Talk with your provider or pharmacist if you have any questions about the medicine..    Don't use nasal rinses or irrigation during an acute sinus infection, unless your healthcare provider tells you to. Rinsing may spread the infection to other areas in your sinuses.    Use acetaminophen or ibuprofen to control pain, unless another pain medicine was prescribed to you. If you have chronic liver or kidney disease or ever had a stomach ulcer, talk with your healthcare provider before using these medicines. Never give aspirin to anyone under age 18 who is ill with a fever. It may cause severe liver damage.    Don't smoke. This can make symptoms worse.    Follow-up care  Follow up with your healthcare provider, or as advised.   When to seek medical advice  Call your healthcare provider if any of these occur:     Facial pain or headache that gets worse    Stiff neck    Unusual drowsiness or confusion    Swelling of your forehead or eyelids    Symptoms don't go away in 10 days    Vision problems, such as blurred or double vision    Fever of 100.4 F (38 C) or higher, or as directed by your healthcare provider  Call 911  Call 911 if any of these occur:     Seizure    Trouble breathing    Feeling dizzy or faint    Fingernails, skin or lips look blue, purple , or gray  Prevention  Here are steps you can take to help prevent an infection:     Keep good hand washing habits.    Don t have close contact with people who have sore throats, colds, or other upper respiratory infections.    Don t smoke, and stay away from secondhand smoke.    Stay up to date with of your vaccines.  [employer] last reviewed this educational content on 12/1/2019 2000-2021 [employer]. All rights reserved. This  information is not intended as a substitute for professional medical care. Always follow your healthcare professional's instructions.        Dear Elizabeth Santizo    After reviewing your responses, I've been able to diagnose you with acute sinusitis.    Based on your responses and diagnosis, I have prescribed levofloxacin   to treat your symptoms. I have sent this to your pharmacy.?     It is also important to stay well hydrated, get lots of rest and take over-the-counter decongestants,?tylenol?or ibuprofen if you?are able to?take those medications per your primary care provider to help relieve discomfort.?     It is important that you take?all of?your prescribed medication even if your symptoms are improving after a few doses.? Taking?all of?your medicine helps prevent the symptoms from returning.?     If your symptoms worsen, you develop severe headache, vomiting, high fever (>102), or are not improving in 7 days, please contact your primary care provider for an appointment or visit any of our convenient Walk-in Care or Urgent Care Centers to be seen which can be found on our website?here.?     Thanks again for choosing?us?as your health care partner,?   ?  Phuong Isaac MD?

## 2022-12-22 ENCOUNTER — LAB (OUTPATIENT)
Dept: LAB | Facility: CLINIC | Age: 57
End: 2022-12-22
Payer: COMMERCIAL

## 2022-12-22 ENCOUNTER — HOSPITAL ENCOUNTER (OUTPATIENT)
Dept: MAMMOGRAPHY | Facility: CLINIC | Age: 57
Discharge: HOME | End: 2022-12-22
Attending: NURSE PRACTITIONER | Admitting: NURSE PRACTITIONER
Payer: COMMERCIAL

## 2022-12-22 DIAGNOSIS — Z12.31 VISIT FOR SCREENING MAMMOGRAM: ICD-10-CM

## 2022-12-22 PROCEDURE — 82274 ASSAY TEST FOR BLOOD FECAL: CPT | Performed by: NURSE PRACTITIONER

## 2022-12-22 PROCEDURE — 77067 SCR MAMMO BI INCL CAD: CPT

## 2022-12-24 LAB — HEMOCCULT STL QL IA: NEGATIVE

## 2023-01-23 ENCOUNTER — TELEPHONE (OUTPATIENT)
Dept: FAMILY MEDICINE | Facility: CLINIC | Age: 58
End: 2023-01-23
Payer: COMMERCIAL

## 2023-01-23 NOTE — TELEPHONE ENCOUNTER
Reason for call:    Symptom or request:     Patient called stating that she was exposed to rsv via , she stating that she woke with dry cough, chills and stretchy throat.     She is unclear if she should be seen.     Did schedule appt for tomorrow; please if needed.     Best Time:  any    Can we leave a detailed message on this number?  YES     Deedee CRAWFORD  Station

## 2023-01-24 ENCOUNTER — E-VISIT (OUTPATIENT)
Dept: URGENT CARE | Facility: CLINIC | Age: 58
End: 2023-01-24
Payer: COMMERCIAL

## 2023-01-24 DIAGNOSIS — J01.90 ACUTE SINUSITIS WITH SYMPTOMS > 10 DAYS: Primary | ICD-10-CM

## 2023-01-24 PROCEDURE — 99421 OL DIG E/M SVC 5-10 MIN: CPT | Performed by: EMERGENCY MEDICINE

## 2023-01-24 RX ORDER — DOXYCYCLINE HYCLATE 100 MG
100 TABLET ORAL 2 TIMES DAILY
Qty: 14 TABLET | Refills: 0 | Status: SHIPPED | OUTPATIENT
Start: 2023-01-24 | End: 2023-01-31

## 2023-01-24 NOTE — PATIENT INSTRUCTIONS
Sinusitis (Antibiotic Treatment)    The sinuses are air-filled spaces within the bones of the face. They connect to the inside of the nose. Sinusitis is an inflammation of the tissue that lines the sinuses. Sinusitis can occur during a cold. It can also happen due to allergies to pollens and other particles in the air. Sinusitis can cause symptoms of sinus congestion and a feeling of fullness. A sinus infection causes fever, headache, and facial pain. There is often green or yellow fluid draining from the nose or into the back of the throat (post-nasal drip). You have been given antibiotics to treat this condition.   Home care    Take the full course of antibiotics as instructed. Don't stop taking them, even when you feel better.    Drink plenty of water, hot tea, and other liquids as directed by the healthcare provider. This may help thin nasal mucus. It also may help your sinuses drain fluids.    Heat may help soothe painful areas of your face. Use a towel soaked in hot water. Or,  the shower and direct the warm spray onto your face. Using a vaporizer along with a menthol rub at night may also help soothe symptoms.     An expectorant with guaifenesin may help thin nasal mucus and help your sinuses drain fluids. Talk with your provider or pharmacists before taking an over-the-counter (OTC) medicine if you have any questions about it or its side effects..    You can use an OTC decongestant, unless a similar medicine was prescribed to you. Nasal sprays work the fastest. Use one that contains phenylephrine or oxymetazoline. First blow your nose gently. Then use the spray. Don't use these medicines more often than directed on the label. If you do, your symptoms may get worse. You may also take pills that contain pseudoephedrine. Don t use products that combine multiple medicines. This is because side effects may be increased. Read labels. You can also ask the pharmacist for help. (People with high blood  malnutrition  DTRs: Deep tendon reflexes are intact  Mental Status: The patient is oriented to time, place and person. The patient's mood and affect are appropriate. Lymphatic: The lymphatic examination bilaterally reveals all areas to be without enlargement or induration. Vascular: Examination reveals no swelling or calf tenderness. Peripheral pulses are palpable and 2+. Neurological: The patient has good coordination. There is no weakness or sensory deficit. Foot  Examination  Inspection: There is swelling over the first MTP joint with some medial erythematous change. She does appear to have some mild early bunion formation with arthritic changes well. Palpation: Exquisitely tender over the first MTP joint and pain with MTP grind testing was noted. Rang of Motion: 50% reduction in first MTP motion. Strength: Flexor and extensor tendon function appears to be intact. Special Tests: Central a 2 out of 10 pain reproduced with left foot first MTP grind testing. No evidence of instability. No sesamoid tenderness or metatarsal shaft tenderness. Skin: There are no rashes, ulcerations or lesions. Distal motor sensory and vascular them appears intact. Gait: Moderate altalgia. Reflex strictly preserved. Additional Comments:     Additional Examinations:  Contralateral Exam: Contralateral right foot exam outside of essentially asymptomatic of bunion formation was noted. Right Lower Extremity: Examination of the right lower extremity does not show any tenderness, deformity or injury. Range of motion is unremarkable. There is no gross instability. There are no rashes, ulcerations or lesions. Strength and tone are normal.  Left Lower Extremity: Examination of the left lower extremity does not show any tenderness, deformity or injury. Range of motion is unremarkable. There is no gross instability. There are no rashes, ulcerations or lesions.   Strength and tone are normal.      Diagnostic pressure should not use decongestants. They can raise blood pressure.) Talk with your provider or pharmacist if you have any questions about the medicine..    OTC antihistamines may help if allergies contributed to your sinusitis. Talk with your provider or pharmacist if you have any questions about the medicine..    Don't use nasal rinses or irrigation during an acute sinus infection, unless your healthcare provider tells you to. Rinsing may spread the infection to other areas in your sinuses.    Use acetaminophen or ibuprofen to control pain, unless another pain medicine was prescribed to you. If you have chronic liver or kidney disease or ever had a stomach ulcer, talk with your healthcare provider before using these medicines. Never give aspirin to anyone under age 18 who is ill with a fever. It may cause severe liver damage.    Don't smoke. This can make symptoms worse.    Follow-up care  Follow up with your healthcare provider, or as advised.   When to seek medical advice  Call your healthcare provider if any of these occur:     Facial pain or headache that gets worse    Stiff neck    Unusual drowsiness or confusion    Swelling of your forehead or eyelids    Symptoms don't go away in 10 days    Vision problems, such as blurred or double vision    Fever of 100.4 F (38 C) or higher, or as directed by your healthcare provider  Call 911  Call 911 if any of these occur:     Seizure    Trouble breathing    Feeling dizzy or faint    Fingernails, skin or lips look blue, purple , or gray  Prevention  Here are steps you can take to help prevent an infection:     Keep good hand washing habits.    Don t have close contact with people who have sore throats, colds, or other upper respiratory infections.    Don t smoke, and stay away from secondhand smoke.    Stay up to date with of your vaccines.  [employer] last reviewed this educational content on 12/1/2019 2000-2021 [employer]. All rights reserved. This  Test Findings:    Radiology:       Left foot AP lateral oblique films were obtained today and does show mild degenerative changes over the first MTP joint without evidence of acute osseous injury. Assessment : #1.  4 days status post persistent symptomatic left first MTP synovitis with left foot pain and altalgia (rule out gouty arthropathy)    Impression:  Encounter Diagnoses   Name Primary?  Left foot pain Yes    Synovitis of toe        Office Procedures:  Orders Placed This Encounter   Procedures    XR FOOT LEFT (MIN 3 VIEWS)     Standing Status:   Future     Number of Occurrences:   1     Standing Expiration Date:   6/30/2022     Order Specific Question:   Reason for exam:     Answer:   pain    Uric Acid     Standing Status:   Future     Standing Expiration Date:   6/30/2022    CBC WITH AUTO DIFFERENTIAL     Standing Status:   Future     Standing Expiration Date:   6/30/2022    DJO Post-Op Shoe     Patient was prescribed a DJO Post-Op Shoe. The right foot will require stabilization / immobilization from this semi-rigid / rigid orthosis to improve their function. The orthosis will assist in protecting the affected area, provide functional support and facilitate healing. Patient was instructed to progress ambulation weight bearing as tolerated in the device. The patient was educated and fit by a healthcare professional with expert knowledge and specialization in brace application. Verbal and written instructions for the use of and application of this item were provided. They were instructed to contact the office immediately should the brace result in increased pain, decreased sensation, increased swelling or worsening of the condition. Treatment Plan: Treatment options were discussed with Zen Cheema today. We did review her plain films and exam findings. She has been having progressive pain symptoms to her left first MTP joint for the past 4 days. We did place her in a postop shoe.   We information is not intended as a substitute for professional medical care. Always follow your healthcare professional's instructions.        Dear Elizabeth Santizo        Based on your responses and diagnosis, I have prescribed doxycylineNo orders of the defined types were placed in this encounter.   to treat your symptoms. I have sent this to your pharmacy.?     It is also important to stay well hydrated, get lots of rest and take over-the-counter decongestants,?tylenol?or ibuprofen if you?are able to?take those medications per your primary care provider to help relieve discomfort.?     It is important that you take?all of?your prescribed medication even if your symptoms are improving after a few doses.? Taking?all of?your medicine helps prevent the symptoms from returning.?     If your symptoms worsen, you develop severe headache, vomiting, high fever (>102), or are not improving in 7 days, please contact your primary care provider for an appointment or visit any of our convenient Walk-in Care or Urgent Care Centers to be seen which can be found on our website?here.?     Thanks again for choosing?us?as your health care partner,?   ?  Van Stuart MD?    will send her to get a uric acid level and CBC checked. I am not highly suspicious for infectious causes. We placed her on a Medrol Dosepak to be followed by diclofenac 75 mg 1 pill twice daily. We will see her back in a couple weeks to see how she is progressing. we will let her know her blood work results by next week. Icing and activity modification of avoidance of her exercise walking for the time being was discussed. May need to consider orthotics as well. She will be seen back in 2 weeks but will contact us in the interim with questions or concerns. This dictation was performed with a verbal recognition program (DRAGON) and it was checked for errors. It is possible that there are still dictated errors within this office note. If so, please bring any errors to my attention for an addendum. All efforts were made to ensure that this office note is accurate.

## 2023-02-20 ENCOUNTER — E-VISIT (OUTPATIENT)
Dept: URGENT CARE | Facility: URGENT CARE | Age: 58
End: 2023-02-20
Payer: COMMERCIAL

## 2023-02-20 DIAGNOSIS — J01.90 ACUTE BACTERIAL SINUSITIS: Primary | ICD-10-CM

## 2023-02-20 DIAGNOSIS — B96.89 ACUTE BACTERIAL SINUSITIS: Primary | ICD-10-CM

## 2023-02-20 PROCEDURE — 99421 OL DIG E/M SVC 5-10 MIN: CPT | Performed by: PHYSICIAN ASSISTANT

## 2023-02-20 RX ORDER — LEVOFLOXACIN 500 MG/1
500 TABLET, FILM COATED ORAL DAILY
Qty: 7 TABLET | Refills: 0 | Status: SHIPPED | OUTPATIENT
Start: 2023-02-20 | End: 2023-02-27

## 2023-02-20 NOTE — PATIENT INSTRUCTIONS
You may want to try a nasal lavage (also known as nasal irrigation). You can find over-the-counter products, such as Neti-Pot, at retail locations or make your own at home. Instructions for homemade nasal lavage and more information on the process are available online at http://[email or web address].[email or web address].      Sinusitis (Antibiotic Treatment)    The sinuses are air-filled spaces within the bones of the face. They connect to the inside of the nose. Sinusitis is an inflammation of the tissue that lines the sinuses. Sinusitis can occur during a cold. It can also happen due to allergies to pollens and other particles in the air. Sinusitis can cause symptoms of sinus congestion and a feeling of fullness. A sinus infection causes fever, headache, and facial pain. There is often green or yellow fluid draining from the nose or into the back of the throat (post-nasal drip). You have been given antibiotics to treat this condition.   Home care    Take the full course of antibiotics as instructed. Don't stop taking them, even when you feel better.    Drink plenty of water, hot tea, and other liquids as directed by the healthcare provider. This may help thin nasal mucus. It also may help your sinuses drain fluids.    Heat may help soothe painful areas of your face. Use a towel soaked in hot water. Or,  the shower and direct the warm spray onto your face. Using a vaporizer along with a menthol rub at night may also help soothe symptoms.     An expectorant with guaifenesin may help thin nasal mucus and help your sinuses drain fluids. Talk with your provider or pharmacists before taking an over-the-counter (OTC) medicine if you have any questions about it or its side effects..    You can use an OTC decongestant, unless a similar medicine was prescribed to you. Nasal sprays work the fastest. Use one that contains phenylephrine or oxymetazoline. First blow your nose gently. Then use the spray. Don't use these  medicines more often than directed on the label. If you do, your symptoms may get worse. You may also take pills that contain pseudoephedrine. Don t use products that combine multiple medicines. This is because side effects may be increased. Read labels. You can also ask the pharmacist for help. (People with high blood pressure should not use decongestants. They can raise blood pressure.) Talk with your provider or pharmacist if you have any questions about the medicine..    OTC antihistamines may help if allergies contributed to your sinusitis. Talk with your provider or pharmacist if you have any questions about the medicine..    Don't use nasal rinses or irrigation during an acute sinus infection, unless your healthcare provider tells you to. Rinsing may spread the infection to other areas in your sinuses.    Use acetaminophen or ibuprofen to control pain, unless another pain medicine was prescribed to you. If you have chronic liver or kidney disease or ever had a stomach ulcer, talk with your healthcare provider before using these medicines. Never give aspirin to anyone under age 18 who is ill with a fever. It may cause severe liver damage.    Don't smoke. This can make symptoms worse.    Follow-up care  Follow up with your healthcare provider, or as advised.   When to seek medical advice  Call your healthcare provider if any of these occur:     Facial pain or headache that gets worse    Stiff neck    Unusual drowsiness or confusion    Swelling of your forehead or eyelids    Symptoms don't go away in 10 days    Vision problems, such as blurred or double vision    Fever of 100.4 F (38 C) or higher, or as directed by your healthcare provider  Call 911  Call 911 if any of these occur:     Seizure    Trouble breathing    Feeling dizzy or faint    Fingernails, skin or lips look blue, purple , or gray  Prevention  Here are steps you can take to help prevent an infection:     Keep good hand washing habits.    Don t  have close contact with people who have sore throats, colds, or other upper respiratory infections.    Don t smoke, and stay away from secondhand smoke.    Stay up to date with of your vaccines.  [employer] last reviewed this educational content on 12/1/2019 2000-2021 [employer]. All rights reserved. This information is not intended as a substitute for professional medical care. Always follow your healthcare professional's instructions.        Dear Elizabeth Santizo    After reviewing your responses, I've been able to diagnose you with Acute bacterial sinusitis.      Based on your responses and diagnosis, I have prescribed   Orders Placed This Encounter     levofloxacin (LEVAQUIN) 500 MG tablet    to treat your symptoms. I have sent this to your pharmacy.?     It is also important to stay well hydrated, get lots of rest and take over-the-counter decongestants,?tylenol?or ibuprofen if you?are able to?take those medications per your primary care provider to help relieve discomfort.?     It is important that you take?all of?your prescribed medication even if your symptoms are improving after a few doses.? Taking?all of?your medicine helps prevent the symptoms from returning.?     If your symptoms worsen, you develop severe headache, vomiting, high fever (>102), or are not improving in 7 days, please contact your primary care provider for an appointment or visit any of our convenient Walk-in Care or Urgent Care Centers to be seen which can be found on our website?here.?     Thanks again for choosing?us?as your health care partner,?   ?  Abbi Rock PA-C?

## 2023-06-12 ENCOUNTER — PATIENT OUTREACH (OUTPATIENT)
Dept: CARE COORDINATION | Facility: CLINIC | Age: 58
End: 2023-06-12
Payer: COMMERCIAL

## 2023-06-26 ENCOUNTER — PATIENT OUTREACH (OUTPATIENT)
Dept: CARE COORDINATION | Facility: CLINIC | Age: 58
End: 2023-06-26
Payer: COMMERCIAL

## 2023-07-25 DIAGNOSIS — E78.5 HYPERLIPIDEMIA WITH TARGET LDL LESS THAN 130: ICD-10-CM

## 2023-07-25 DIAGNOSIS — I10 ESSENTIAL HYPERTENSION WITH GOAL BLOOD PRESSURE LESS THAN 140/90: ICD-10-CM

## 2023-07-26 NOTE — TELEPHONE ENCOUNTER
"Has appointment scheduled for 8/7/23      Requested Prescriptions   Pending Prescriptions Disp Refills    atorvastatin (LIPITOR) 40 MG tablet [Pharmacy Med Name: Atorvastatin Calcium 40 MG Oral Tablet] 30 tablet 0     Sig: Take 1 tablet by mouth once daily       Statins Protocol Failed - 7/25/2023  4:45 PM        Failed - LDL on file in past 12 months     Recent Labs   Lab Test 07/12/22  0725   *             Passed - No abnormal creatine kinase in past 12 months     No lab results found.             Passed - Recent (12 mo) or future (30 days) visit within the authorizing provider's specialty     Patient has had an office visit with the authorizing provider or a provider within the authorizing providers department within the previous 12 mos or has a future within next 30 days. See \"Patient Info\" tab in inbasket, or \"Choose Columns\" in Meds & Orders section of the refill encounter.              Passed - Medication is active on med list        Passed - Patient is age 18 or older        Passed - No active pregnancy on record        Passed - No positive pregnancy test in past 12 months          metoprolol succinate ER (TOPROL XL) 50 MG 24 hr tablet [Pharmacy Med Name: Metoprolol Succinate ER 50 MG Oral Tablet Extended Release 24 Hour] 30 tablet 0     Sig: Take 1 tablet by mouth once daily       Beta-Blockers Protocol Failed - 7/25/2023  4:45 PM        Failed - Blood pressure under 140/90 in past 12 months     BP Readings from Last 3 Encounters:   07/12/22 116/82   07/27/21 132/86   04/23/21 124/80                 Passed - Patient is age 6 or older        Passed - Recent (12 mo) or future (30 days) visit within the authorizing provider's specialty     Patient has had an office visit with the authorizing provider or a provider within the authorizing providers department within the previous 12 mos or has a future within next 30 days. See \"Patient Info\" tab in inbasket, or \"Choose Columns\" in Meds & Orders section of " the refill encounter.              Passed - Medication is active on med list

## 2023-07-27 RX ORDER — ATORVASTATIN CALCIUM 40 MG/1
TABLET, FILM COATED ORAL
Qty: 30 TABLET | Refills: 0 | Status: SHIPPED | OUTPATIENT
Start: 2023-07-27 | End: 2023-08-07

## 2023-07-27 RX ORDER — METOPROLOL SUCCINATE 50 MG/1
TABLET, EXTENDED RELEASE ORAL
Qty: 30 TABLET | Refills: 0 | Status: SHIPPED | OUTPATIENT
Start: 2023-07-27 | End: 2023-08-07

## 2023-08-07 ENCOUNTER — TELEPHONE (OUTPATIENT)
Dept: FAMILY MEDICINE | Facility: CLINIC | Age: 58
End: 2023-08-07

## 2023-08-07 ENCOUNTER — MYC MEDICAL ADVICE (OUTPATIENT)
Dept: FAMILY MEDICINE | Facility: CLINIC | Age: 58
End: 2023-08-07

## 2023-08-07 ENCOUNTER — OFFICE VISIT (OUTPATIENT)
Dept: FAMILY MEDICINE | Facility: CLINIC | Age: 58
End: 2023-08-07
Payer: COMMERCIAL

## 2023-08-07 VITALS
BODY MASS INDEX: 25.56 KG/M2 | OXYGEN SATURATION: 98 % | RESPIRATION RATE: 20 BRPM | WEIGHT: 130.2 LBS | HEIGHT: 60 IN | DIASTOLIC BLOOD PRESSURE: 88 MMHG | TEMPERATURE: 97.9 F | SYSTOLIC BLOOD PRESSURE: 136 MMHG | HEART RATE: 59 BPM

## 2023-08-07 DIAGNOSIS — E78.5 HYPERLIPIDEMIA WITH TARGET LDL LESS THAN 130: ICD-10-CM

## 2023-08-07 DIAGNOSIS — J01.00 ACUTE NON-RECURRENT MAXILLARY SINUSITIS: ICD-10-CM

## 2023-08-07 DIAGNOSIS — R73.01 IMPAIRED FASTING GLUCOSE: ICD-10-CM

## 2023-08-07 DIAGNOSIS — J30.2 SEASONAL ALLERGIC RHINITIS, UNSPECIFIED TRIGGER: ICD-10-CM

## 2023-08-07 DIAGNOSIS — Z12.11 COLON CANCER SCREENING: ICD-10-CM

## 2023-08-07 DIAGNOSIS — I10 ESSENTIAL HYPERTENSION WITH GOAL BLOOD PRESSURE LESS THAN 140/90: ICD-10-CM

## 2023-08-07 DIAGNOSIS — Z00.00 ROUTINE GENERAL MEDICAL EXAMINATION AT A HEALTH CARE FACILITY: Primary | ICD-10-CM

## 2023-08-07 DIAGNOSIS — J32.0 CHRONIC MAXILLARY SINUSITIS: ICD-10-CM

## 2023-08-07 LAB
ANION GAP SERPL CALCULATED.3IONS-SCNC: 10 MMOL/L (ref 7–15)
BUN SERPL-MCNC: 12.5 MG/DL (ref 6–20)
CALCIUM SERPL-MCNC: 9.7 MG/DL (ref 8.6–10)
CHLORIDE SERPL-SCNC: 107 MMOL/L (ref 98–107)
CHOLEST SERPL-MCNC: 171 MG/DL
CREAT SERPL-MCNC: 0.55 MG/DL (ref 0.51–0.95)
DEPRECATED HCO3 PLAS-SCNC: 27 MMOL/L (ref 22–29)
GFR SERPL CREATININE-BSD FRML MDRD: >90 ML/MIN/1.73M2
GLUCOSE SERPL-MCNC: 107 MG/DL (ref 70–99)
HBA1C MFR BLD: 5.7 % (ref 0–5.6)
HDLC SERPL-MCNC: 55 MG/DL
LDLC SERPL CALC-MCNC: 93 MG/DL
NONHDLC SERPL-MCNC: 116 MG/DL
POTASSIUM SERPL-SCNC: 4.2 MMOL/L (ref 3.4–5.3)
SODIUM SERPL-SCNC: 144 MMOL/L (ref 136–145)
TRIGL SERPL-MCNC: 116 MG/DL

## 2023-08-07 PROCEDURE — 83036 HEMOGLOBIN GLYCOSYLATED A1C: CPT | Performed by: NURSE PRACTITIONER

## 2023-08-07 PROCEDURE — 80061 LIPID PANEL: CPT | Performed by: NURSE PRACTITIONER

## 2023-08-07 PROCEDURE — 80048 BASIC METABOLIC PNL TOTAL CA: CPT | Performed by: NURSE PRACTITIONER

## 2023-08-07 PROCEDURE — 99396 PREV VISIT EST AGE 40-64: CPT | Performed by: NURSE PRACTITIONER

## 2023-08-07 PROCEDURE — 36415 COLL VENOUS BLD VENIPUNCTURE: CPT | Performed by: NURSE PRACTITIONER

## 2023-08-07 PROCEDURE — 99213 OFFICE O/P EST LOW 20 MIN: CPT | Mod: 25 | Performed by: NURSE PRACTITIONER

## 2023-08-07 RX ORDER — METOPROLOL SUCCINATE 50 MG/1
50 TABLET, EXTENDED RELEASE ORAL DAILY
Qty: 90 TABLET | Refills: 3 | Status: SHIPPED | OUTPATIENT
Start: 2023-08-07 | End: 2024-08-09

## 2023-08-07 RX ORDER — ATORVASTATIN CALCIUM 40 MG/1
40 TABLET, FILM COATED ORAL DAILY
Qty: 90 TABLET | Refills: 3 | Status: SHIPPED | OUTPATIENT
Start: 2023-08-07 | End: 2023-08-07

## 2023-08-07 RX ORDER — METOPROLOL SUCCINATE 50 MG/1
50 TABLET, EXTENDED RELEASE ORAL DAILY
Qty: 90 TABLET | Refills: 3 | Status: SHIPPED | OUTPATIENT
Start: 2023-08-07 | End: 2023-08-07

## 2023-08-07 RX ORDER — FLUTICASONE PROPIONATE 50 MCG
1 SPRAY, SUSPENSION (ML) NASAL DAILY
Qty: 16 G | Refills: 11 | Status: SHIPPED | OUTPATIENT
Start: 2023-08-07 | End: 2024-08-09

## 2023-08-07 RX ORDER — DOXYCYCLINE HYCLATE 100 MG
100 TABLET ORAL 2 TIMES DAILY
Qty: 20 TABLET | Refills: 0 | Status: SHIPPED | OUTPATIENT
Start: 2023-08-07 | End: 2023-08-17

## 2023-08-07 RX ORDER — ATORVASTATIN CALCIUM 40 MG/1
40 TABLET, FILM COATED ORAL DAILY
Qty: 90 TABLET | Refills: 3 | Status: SHIPPED | OUTPATIENT
Start: 2023-08-07 | End: 2024-08-09

## 2023-08-07 ASSESSMENT — ENCOUNTER SYMPTOMS
DIZZINESS: 0
PARESTHESIAS: 0
DYSURIA: 0
MYALGIAS: 1
SHORTNESS OF BREATH: 0
HEADACHES: 1
HEARTBURN: 0
ABDOMINAL PAIN: 0
SORE THROAT: 0
DIARRHEA: 0
WEAKNESS: 0
FREQUENCY: 0
NAUSEA: 0
NERVOUS/ANXIOUS: 0
JOINT SWELLING: 1
FEVER: 0
EYE PAIN: 0
HEMATURIA: 0
BREAST MASS: 0
COUGH: 0
PALPITATIONS: 0
HEMATOCHEZIA: 0
CONSTIPATION: 0
ARTHRALGIAS: 0
CHILLS: 0

## 2023-08-07 ASSESSMENT — PATIENT HEALTH QUESTIONNAIRE - PHQ9
SUM OF ALL RESPONSES TO PHQ QUESTIONS 1-9: 1
SUM OF ALL RESPONSES TO PHQ QUESTIONS 1-9: 1
10. IF YOU CHECKED OFF ANY PROBLEMS, HOW DIFFICULT HAVE THESE PROBLEMS MADE IT FOR YOU TO DO YOUR WORK, TAKE CARE OF THINGS AT HOME, OR GET ALONG WITH OTHER PEOPLE: NOT DIFFICULT AT ALL

## 2023-08-07 ASSESSMENT — PAIN SCALES - GENERAL: PAINLEVEL: SEVERE PAIN (6)

## 2023-08-07 NOTE — PROGRESS NOTES
SUBJECTIVE:   CC: Elizabeth is an 58 year old who presents for preventive health visit.       8/7/2023     6:50 AM   Additional Questions   Roomed by Del MELCHOR CMA   Accompanied by self     Chief Complaint   Patient presents with     Physical     Hyperlipidemia     Follow up , renew meds     Hypertension     Follow up , renew meds     Refill Request     Flonase       Sinus Problem     Symptoms of sinus symptoms x 1 month          Healthy Habits:     Getting at least 3 servings of Calcium per day:  NO    Bi-annual eye exam:  Yes    Dental care twice a year:  Yes    Sleep apnea or symptoms of sleep apnea:  None    Diet:  Regular (no restrictions)    Frequency of exercise:  None    Taking medications regularly:  Yes    Medication side effects:  Muscle aches    Additional concerns today:  Yes      Today's PHQ-9 Score:       8/7/2023     6:42 AM   PHQ-9 SCORE   PHQ-9 Total Score MyChart 1 (Minimal depression)   PHQ-9 Total Score 1         Acute Illness   Acute illness concerns: Sinus   Onset: x 1+Months   Fever: YES- has felt feverish has Not checked temp   Chills/Sweats: YES- chills  Headache (location?): YES- constant by eyes, forehead   Sinus Pressure:YES  Conjunctivitis:  YES: bilateral  Ear Pain: no  Rhinorrhea: YES  Congestion: YES  Sore Throat: no   Cough: no  Wheeze: no  Decreased Appetite: no  Nausea: no  Vomiting: no  Diarrhea:  no  Dysuria/Freq.: no  Fatigue/Achiness: YES  Sick/Strep Exposure: no     Therapies Tried and outcome: Netti pot, sudafed, flonase, claritin - temp relief     Chronic sinus issues:  Chronic sinus congestion and pressure for years.  Taking claritin and flonase daily.  Has had 5 rounds of antibiotics in the last 12 months.      Hyperlipidemia Follow-Up    Are you regularly taking any medication or supplement to lower your cholesterol?   Yes- atorvastatin  Are you having muscle aches or other side effects that you think could be caused by your cholesterol lowering medication?  Yes- muscle  aches     Hypertension Follow-up    Do you check your blood pressure regularly outside of the clinic? No   Are you following a low salt diet? Yes  Are your blood pressures ever more than 140 on the top number (systolic) OR more   than 90 on the bottom number (diastolic), for example 140/90? No      Social History     Tobacco Use     Smoking status: Former     Packs/day: 0.30     Years: 9.00     Pack years: 2.70     Types: Cigarettes     Start date: 2001     Quit date: 2/15/2010     Years since quittin.4     Smokeless tobacco: Never   Substance Use Topics     Alcohol use: Yes     Alcohol/week: 3.3 standard drinks of alcohol     Comment: socially             2023     6:44 AM   Alcohol Use   Prescreen: >3 drinks/day or >7 drinks/week? No     Reviewed orders with patient.  Reviewed health maintenance and updated orders accordingly - Yes      Breast Cancer Screenin/22/2021     6:30 PM   Breast CA Risk Assessment (FHS-7)   Do you have a family history of breast, colon, or ovarian cancer? No / Unknown         Mammogram Screening: Recommended annual mammography  Pertinent mammograms are reviewed under the imaging tab.    History of abnormal Pap smear: NO - age 30-65 PAP every 5 years with negative HPV co-testing recommended      Latest Ref Rng & Units 3/6/2020     8:03 AM 3/6/2020     8:00 AM 2017     8:45 AM   PAP / HPV   PAP (Historical)  NIL      HPV 16 DNA NEG^Negative  Negative  Negative    HPV 18 DNA NEG^Negative  Negative  Negative    Other HR HPV NEG^Negative  Negative  Negative      Reviewed and updated as needed this visit by clinical staff   Tobacco  Allergies  Meds  Problems  Med Hx  Surg Hx  Fam Hx          Reviewed and updated as needed this visit by Provider   Tobacco  Allergies  Meds  Problems  Med Hx  Surg Hx  Fam Hx             Review of Systems   Constitutional:  Negative for chills and fever.   HENT:  Positive for congestion. Negative for ear pain, hearing loss  "and sore throat.    Eyes:  Negative for pain and visual disturbance.   Respiratory:  Negative for cough and shortness of breath.    Cardiovascular:  Negative for chest pain, palpitations and peripheral edema.   Gastrointestinal:  Negative for abdominal pain, constipation, diarrhea, heartburn, hematochezia and nausea.   Breasts:  Negative for tenderness, breast mass and discharge.   Genitourinary:  Negative for dysuria, frequency, genital sores, hematuria, pelvic pain, urgency, vaginal bleeding and vaginal discharge.   Musculoskeletal:  Positive for joint swelling and myalgias. Negative for arthralgias.   Skin:  Negative for rash.   Neurological:  Positive for headaches. Negative for dizziness, weakness and paresthesias.   Psychiatric/Behavioral:  Negative for mood changes. The patient is not nervous/anxious.           OBJECTIVE:   /88   Pulse 59   Temp 97.9  F (36.6  C) (Tympanic)   Resp 20   Ht 1.53 m (5' 0.25\")   Wt 59.1 kg (130 lb 3.2 oz)   LMP 07/01/2018   SpO2 98%   BMI 25.22 kg/m    Physical Exam  GENERAL APPEARANCE: healthy, alert and no distress  EYES: Eyes grossly normal to inspection, PERRL and conjunctivae and sclerae normal  HENT: ear canals and TM's normal, nose and mouth without ulcers or lesions, oropharynx clear and oral mucous membranes moist  NECK: no adenopathy, no asymmetry, masses, or scars and thyroid normal to palpation  RESP: lungs clear to auscultation - no rales, rhonchi or wheezes  BREAST: normal without masses, tenderness or nipple discharge and no palpable axillary masses or adenopathy  CV: regular rate and rhythm, normal S1 S2, no S3 or S4, no murmur, click or rub, no peripheral edema and peripheral pulses strong  ABDOMEN: soft, nontender, no hepatosplenomegaly, no masses and bowel sounds normal  MS: no musculoskeletal defects are noted and gait is age appropriate without ataxia  SKIN: no suspicious lesions or rashes  NEURO: Normal strength and tone, sensory exam grossly " normal, mentation intact and speech normal  PSYCH: mentation appears normal and affect normal/bright        ASSESSMENT/PLAN:       ICD-10-CM    1. Routine general medical examination at a health care facility  Z00.00       2. Hyperlipidemia with target LDL less than 130  E78.5 Well controlled.  atorvastatin (LIPITOR) 40 MG tablet     OFFICE/OUTPT VISIT,EST,LEVL III     Lipid panel reflex to direct LDL Fasting     Lipid panel reflex to direct LDL Fasting      3. Essential hypertension with goal blood pressure less than 140/90  I10 Well controlled.  metoprolol succinate ER (TOPROL XL) 50 MG 24 hr tablet     OFFICE/OUTPT VISIT,EST,LEVL III     Basic metabolic panel  (Ca, Cl, CO2, Creat, Gluc, K, Na, BUN)     Basic metabolic panel  (Ca, Cl, CO2, Creat, Gluc, K, Na, BUN)      4. Seasonal allergic rhinitis, unspecified trigger  J30.2 Continue fluticasone (FLONASE) 50 MCG/ACT nasal spray      5. Impaired fasting glucose  R73.01 Hemoglobin A1c - recheck today     Hemoglobin A1c      6. Acute non-recurrent maxillary sinusitis  J01.00 doxycycline hyclate (VIBRA-TABS) 100 MG tablet BID for 10 days.      7. Chronic maxillary sinusitis  J32.0 Chronic sinus complaints.  Discussed with patient the risks of antibiotic overuse.   Continue flonase and claritin.  Continue neti pot  Recommend ENT consult for further treatment options.  Adult ENT  Referral      8. Colon cancer screening  Z12.11 Fecal colorectal cancer screen (FIT)          Patient has been advised of split billing requirements and indicates understanding: Yes by AFR and CMA.      COUNSELING:  Reviewed preventive health counseling, as reflected in patient instructions        She reports that she quit smoking about 13 years ago. Her smoking use included cigarettes. She started smoking about 21 years ago. She has a 2.70 pack-year smoking history. She has never used smokeless tobacco.          SOLANGE Wall CNP  [facility]  submitted by the patient for this visit:  Patient Health Questionnaire (Submitted on 8/7/2023)  If you checked off any problems, how difficult have these problems made it for you to do your work, take care of things at home, or get along with other people?: Not difficult at all  PHQ9 TOTAL SCORE: 1

## 2023-08-07 NOTE — TELEPHONE ENCOUNTER
[facility] in [place] was notified to cancel metoprolol and atorvastatin.  Pt will get these thru [facility]  [facility].

## 2023-11-22 ENCOUNTER — PATIENT OUTREACH (OUTPATIENT)
Dept: CARE COORDINATION | Facility: CLINIC | Age: 58
End: 2023-11-22
Payer: COMMERCIAL

## 2023-12-20 ENCOUNTER — PATIENT OUTREACH (OUTPATIENT)
Dept: CARE COORDINATION | Facility: CLINIC | Age: 58
End: 2023-12-20
Payer: COMMERCIAL

## 2023-12-28 ENCOUNTER — LAB (OUTPATIENT)
Dept: LAB | Facility: CLINIC | Age: 58
End: 2023-12-28
Payer: COMMERCIAL

## 2023-12-28 DIAGNOSIS — Z12.11 COLON CANCER SCREENING: ICD-10-CM

## 2023-12-28 PROCEDURE — 82274 ASSAY TEST FOR BLOOD FECAL: CPT

## 2024-01-05 LAB — HEMOCCULT STL QL IA: NEGATIVE

## 2024-01-27 ENCOUNTER — E-VISIT (OUTPATIENT)
Dept: URGENT CARE | Facility: CLINIC | Age: 59
End: 2024-01-27
Payer: COMMERCIAL

## 2024-01-27 DIAGNOSIS — B96.89 ACUTE BACTERIAL SINUSITIS: Primary | ICD-10-CM

## 2024-01-27 DIAGNOSIS — J01.90 ACUTE BACTERIAL SINUSITIS: Primary | ICD-10-CM

## 2024-01-27 PROCEDURE — 99421 OL DIG E/M SVC 5-10 MIN: CPT | Performed by: NURSE PRACTITIONER

## 2024-01-27 RX ORDER — DOXYCYCLINE HYCLATE 100 MG
100 TABLET ORAL 2 TIMES DAILY
Qty: 14 TABLET | Refills: 0 | Status: SHIPPED | OUTPATIENT
Start: 2024-01-27 | End: 2024-02-03

## 2024-01-27 NOTE — PATIENT INSTRUCTIONS
Thank you for choosing us for your care. I have placed an order for a prescription so that you can start treatment. View your full visit summary for details by clicking on the link below. Your pharmacist will able to address any questions you may have about the medication.     If you're not feeling better within 5-7 days, please schedule an appointment.  You can schedule an appointment right here in [facility], or call [phone]  If the visit is for the same symptoms as your eVisit, we'll refund the cost of your eVisit if seen within seven days.

## 2024-02-28 ENCOUNTER — TELEPHONE (OUTPATIENT)
Dept: FAMILY MEDICINE | Facility: CLINIC | Age: 59
End: 2024-02-28
Payer: COMMERCIAL

## 2024-02-28 ENCOUNTER — HOSPITAL ENCOUNTER (EMERGENCY)
Facility: CLINIC | Age: 59
Discharge: HOME | End: 2024-02-28
Attending: NURSE PRACTITIONER | Admitting: NURSE PRACTITIONER
Payer: COMMERCIAL

## 2024-02-28 VITALS
OXYGEN SATURATION: 99 % | HEART RATE: 70 BPM | DIASTOLIC BLOOD PRESSURE: 91 MMHG | RESPIRATION RATE: 16 BRPM | SYSTOLIC BLOOD PRESSURE: 149 MMHG | TEMPERATURE: 98.1 F

## 2024-02-28 DIAGNOSIS — S09.93XA MOUTH INJURY, INITIAL ENCOUNTER: ICD-10-CM

## 2024-02-28 PROCEDURE — G0463 HOSPITAL OUTPT CLINIC VISIT: HCPCS | Performed by: NURSE PRACTITIONER

## 2024-02-28 PROCEDURE — 99213 OFFICE O/P EST LOW 20 MIN: CPT | Performed by: NURSE PRACTITIONER

## 2024-02-28 ASSESSMENT — COLUMBIA-SUICIDE SEVERITY RATING SCALE - C-SSRS
1. IN THE PAST MONTH, HAVE YOU WISHED YOU WERE DEAD OR WISHED YOU COULD GO TO SLEEP AND NOT WAKE UP?: NO
2. HAVE YOU ACTUALLY HAD ANY THOUGHTS OF KILLING YOURSELF IN THE PAST MONTH?: NO
6. HAVE YOU EVER DONE ANYTHING, STARTED TO DO ANYTHING, OR PREPARED TO DO ANYTHING TO END YOUR LIFE?: NO

## 2024-02-28 ASSESSMENT — ACTIVITIES OF DAILY LIVING (ADL): ADLS_ACUITY_SCORE: 35

## 2024-02-28 NOTE — ED PROVIDER NOTES
ED Provider Note  [facility]      History     Chief Complaint   Patient presents with    Mouth Injury     HPI  Elizabeth Santizo is a 58 year old female who reports mouth , soft palate, soft tissue injury on the left side of her mouth after falling onto a electric toothbrush that had hand onto the dresser top jammed into her mouth.  Tolerating oral fluids.  Reports the bleeding stopped at approximately 45 minutes from the time of injury.  Denies any head trauma or loss of consciousness.  Denies any other pain or injuries at this time.  She is not diabetic and is not a person has had difficulties with wound healing in the past.  Patient reports that her main concern today is that she would develop an infection in her mouth.            Allergies:  Allergies   Allergen Reactions    Amoxicillin Rash       Problem List:    Patient Active Problem List    Diagnosis Date Noted    Pre-diabetes 2021     Priority: Medium    Mild episode of recurrent major depressive disorder (H24) 2018     Priority: Medium    Impaired fasting glucose 2016     Priority: Medium    Anxiety 2016     Priority: Medium    Hypertension goal BP (blood pressure) < 140/90 10/31/2013     Priority: Medium    Hyperlipidemia with target LDL less than 130 10/31/2013     Priority: Medium     Diagnosis updated by automated process. Provider to review and confirm.          Past Medical History:    Past Medical History:   Diagnosis Date    Generalized headaches     Hyperlipidemia     Hypertension     Impaired fasting glucose     Overweight(278.02)        Past Surgical History:    Past Surgical History:   Procedure Laterality Date    TUBAL LIGATION         Family History:    Family History   Problem Relation Age of Onset    Diabetes Mother         passed away in     Hypertension Mother     Hyperlipidemia Mother     C.A.D. Father          55 from [place]    Cerebrovascular Disease Father         passed  Pt's wife calling about [employer] not being covered any longer. Taken off the market? Please advise for change in med, thanks   away in     Cerebrovascular Disease Maternal Grandmother         passed away in     Diabetes Maternal Grandmother         passed away in     Hypertension Maternal Grandmother     Hyperlipidemia Maternal Grandmother     Cancer Maternal Aunt         aunt    Diabetes Cousin         3 cousins    Diabetes Other         maternal uncle    Other Cancer Other         aunt - passed away     Diabetes Other         maternal aunt    Diabetes Other         maternal uncle    Diabetes Other         maternal aunt    Breast Cancer No family hx of     Cancer - colorectal No family hx of        Social History:  Marital Status:   [2]  Social History     Tobacco Use    Smoking status: Former     Packs/day: 0.30     Years: 9.00     Additional pack years: 0.00     Total pack years: 2.70     Types: Cigarettes     Start date: 2001     Quit date: 2/15/2010     Years since quittin.0    Smokeless tobacco: Never   Vaping Use    Vaping Use: Never used   Substance Use Topics    Alcohol use: Yes     Alcohol/week: 3.3 standard drinks of alcohol     Comment: socially    Drug use: No        Medications:    Ascorbic Acid (VITAMIN C PO)  atorvastatin (LIPITOR) 40 MG tablet  Cyanocobalamin (B-12) 100 MCG TABS  fluticasone (FLONASE) 50 MCG/ACT nasal spray  metoprolol succinate ER (TOPROL XL) 50 MG 24 hr tablet  Omega-3 Fatty Acids (OMEGA 3 PO)  omeprazole (PRILOSEC) 20 MG DR capsule          Review of Systems  A medically appropriate review of systems was performed with pertinent positives and negatives noted in the HPI, and all other systems negative.    Physical Exam   Patient Vitals for the past 24 hrs:   BP Temp Temp src Pulse Resp SpO2   24 1215 (!) 149/91 98.1  F (36.7  C) Tympanic 70 16 99 %          Physical Exam  General: alert and in no acute distress on arrival  Ears/Nose/Throat: ENT: Left Ear: TM intact, middle ear is not erythremic, no purulence, canal patent. Right Ear: TM intact, middle ear is not  erythremic, no purulence, canal patent. Nose: No erythema or edema patent nostrils bilateral.  Mouth: No oral lesions, normal dentition, lower soft palate on the left side is a 1/2 cm window within the tissue adjacent to her left tonsil.  There is no bleeding, no drainage.  throat: midline uvula, non-erythremic, non-enlarged tonsils, without exudate.  No cervical adenopathy.   Head: atraumatic, normocephalic  Lungs:  nonlabored, CTA bilateral throughout.  CV: Regular rate and rhythm, extremities warm and perfused  Skin: no rashes, no diaphoresis and skin color normal  Neuro: Patient awake, alert, speech is fluent, appropriate historian.  Psychiatric: affect/mood normal, pleasant.      ED Course                 Procedures                    No results found for this or any previous visit (from the past 24 hour(s)).    MEDICATIONS GIVEN IN THE EMERGENCY DEPARTMENT:  Medications - No data to display             Assessments & Plan (with Medical Decision Making)  58 year old female who presents to the Urgent Care for evaluation of injury initial encounter, no antibiotics or prescriptions were ordered at this time.  Reviewed signs and symptoms of infection including increased redness, pus or increased pain, fevers if these occur recommend that she is seen for further evaluation.  No concern at this time for ongoing blood loss, vascular injury, no difficulty swallowing or breathing.  Advised to gargle and rinse salt water after eating to prevent debris from settling into the soft tissue window.       I have reviewed the nursing notes.    I have reviewed the findings, diagnosis, plan and need for follow up with the patient.        NEW PRESCRIPTIONS STARTED AT TODAY'S ER VISIT  New Prescriptions    No medications on file       Final diagnoses:   Mouth injury, initial encounter       2/28/2024   [facility] EMERGENCY DEPT       Carolyn Vaca, APRN CNP  02/28/24 [id number]

## 2024-02-28 NOTE — ED TRIAGE NOTES
Pt was brushing her teeth this morning with her electric toothbrush and bent down to grab something and toothbrush punctured her tonsil - feel like a bad sore throat

## 2024-02-28 NOTE — DISCHARGE INSTRUCTIONS
Recommend rinsing and gargling with salt water after eating to prevent debris from lodging and soft tissue injury site.  Avoid acidic, salty and spicy foods to prevent painful eating.  If you develop signs of infection including fever, increased redness increased pain recommend reevaluation in the emergency department.  If you were to develop bleeding that did not stop this would prompt more emergent care in the emergency department and you should be seen for this.

## 2024-02-28 NOTE — TELEPHONE ENCOUNTER
S: Patient calls reporting puncture to throat tissue.   B: She states she was brushing her teeth, bent down to grab something with toothbrush in her mouth and the toothbrush bumped something and puncture the back of her throat.   A: Patient reports small amount of bleeding.  It was bleeding more, but then she gargled and it seemed to slow. She is able to swallow with no breathing difficulty.  R: There is no RN protocol for this situation.  RN advised ER visit now for evaluation and treatment.  Patient is in agreement with plan.    Nasra Sultana RN  [facility]

## 2024-04-28 ENCOUNTER — HEALTH MAINTENANCE LETTER (OUTPATIENT)
Age: 59
End: 2024-04-28

## 2024-05-30 DIAGNOSIS — I10 ESSENTIAL HYPERTENSION WITH GOAL BLOOD PRESSURE LESS THAN 140/90: ICD-10-CM

## 2024-05-30 DIAGNOSIS — E78.5 HYPERLIPIDEMIA WITH TARGET LDL LESS THAN 130: ICD-10-CM

## 2024-05-31 RX ORDER — METOPROLOL SUCCINATE 50 MG/1
50 TABLET, EXTENDED RELEASE ORAL DAILY
Qty: 90 TABLET | Refills: 3 | OUTPATIENT
Start: 2024-05-31

## 2024-05-31 RX ORDER — ATORVASTATIN CALCIUM 40 MG/1
40 TABLET, FILM COATED ORAL DAILY
Qty: 90 TABLET | Refills: 3 | OUTPATIENT
Start: 2024-05-31

## 2024-08-08 SDOH — HEALTH STABILITY: PHYSICAL HEALTH: ON AVERAGE, HOW MANY MINUTES DO YOU ENGAGE IN EXERCISE AT THIS LEVEL?: 0 MIN

## 2024-08-08 SDOH — HEALTH STABILITY: PHYSICAL HEALTH: ON AVERAGE, HOW MANY DAYS PER WEEK DO YOU ENGAGE IN MODERATE TO STRENUOUS EXERCISE (LIKE A BRISK WALK)?: 0 DAYS

## 2024-08-08 ASSESSMENT — PATIENT HEALTH QUESTIONNAIRE - PHQ9
SUM OF ALL RESPONSES TO PHQ QUESTIONS 1-9: 0
SUM OF ALL RESPONSES TO PHQ QUESTIONS 1-9: 0

## 2024-08-08 ASSESSMENT — SOCIAL DETERMINANTS OF HEALTH (SDOH): HOW OFTEN DO YOU GET TOGETHER WITH FRIENDS OR RELATIVES?: ONCE A WEEK

## 2024-08-09 ENCOUNTER — OFFICE VISIT (OUTPATIENT)
Dept: FAMILY MEDICINE | Facility: CLINIC | Age: 59
End: 2024-08-09
Payer: COMMERCIAL

## 2024-08-09 VITALS
RESPIRATION RATE: 10 BRPM | OXYGEN SATURATION: 99 % | HEART RATE: 64 BPM | TEMPERATURE: 97.8 F | DIASTOLIC BLOOD PRESSURE: 80 MMHG | SYSTOLIC BLOOD PRESSURE: 126 MMHG | HEIGHT: 60 IN | WEIGHT: 122 LBS | BODY MASS INDEX: 23.95 KG/M2

## 2024-08-09 DIAGNOSIS — I10 ESSENTIAL HYPERTENSION WITH GOAL BLOOD PRESSURE LESS THAN 140/90: ICD-10-CM

## 2024-08-09 DIAGNOSIS — Z12.31 VISIT FOR SCREENING MAMMOGRAM: ICD-10-CM

## 2024-08-09 DIAGNOSIS — Z00.00 ROUTINE GENERAL MEDICAL EXAMINATION AT A HEALTH CARE FACILITY: Primary | ICD-10-CM

## 2024-08-09 DIAGNOSIS — R73.03 PRE-DIABETES: ICD-10-CM

## 2024-08-09 DIAGNOSIS — L98.9 FOOT LESION: ICD-10-CM

## 2024-08-09 DIAGNOSIS — E78.5 HYPERLIPIDEMIA WITH TARGET LDL LESS THAN 130: ICD-10-CM

## 2024-08-09 LAB
ANION GAP SERPL CALCULATED.3IONS-SCNC: 12 MMOL/L (ref 7–15)
BUN SERPL-MCNC: 11.7 MG/DL (ref 6–20)
CALCIUM SERPL-MCNC: 10.2 MG/DL (ref 8.8–10.4)
CHLORIDE SERPL-SCNC: 104 MMOL/L (ref 98–107)
CHOLEST SERPL-MCNC: 191 MG/DL
CREAT SERPL-MCNC: 0.51 MG/DL (ref 0.51–0.95)
EGFRCR SERPLBLD CKD-EPI 2021: >90 ML/MIN/1.73M2
FASTING STATUS PATIENT QL REPORTED: YES
FASTING STATUS PATIENT QL REPORTED: YES
GLUCOSE SERPL-MCNC: 95 MG/DL (ref 70–99)
HBA1C MFR BLD: 5.8 % (ref 0–5.6)
HCO3 SERPL-SCNC: 25 MMOL/L (ref 22–29)
HDLC SERPL-MCNC: 51 MG/DL
LDLC SERPL CALC-MCNC: 110 MG/DL
NONHDLC SERPL-MCNC: 140 MG/DL
POTASSIUM SERPL-SCNC: 4.3 MMOL/L (ref 3.4–5.3)
SODIUM SERPL-SCNC: 141 MMOL/L (ref 135–145)
TRIGL SERPL-MCNC: 148 MG/DL

## 2024-08-09 PROCEDURE — 36415 COLL VENOUS BLD VENIPUNCTURE: CPT | Performed by: NURSE PRACTITIONER

## 2024-08-09 PROCEDURE — 99214 OFFICE O/P EST MOD 30 MIN: CPT | Mod: 25 | Performed by: NURSE PRACTITIONER

## 2024-08-09 PROCEDURE — 83036 HEMOGLOBIN GLYCOSYLATED A1C: CPT | Performed by: NURSE PRACTITIONER

## 2024-08-09 PROCEDURE — 80061 LIPID PANEL: CPT | Performed by: NURSE PRACTITIONER

## 2024-08-09 PROCEDURE — 80048 BASIC METABOLIC PNL TOTAL CA: CPT | Performed by: NURSE PRACTITIONER

## 2024-08-09 PROCEDURE — 99396 PREV VISIT EST AGE 40-64: CPT | Performed by: NURSE PRACTITIONER

## 2024-08-09 RX ORDER — CHOLECALCIFEROL (VITAMIN D3) 125 MCG
CAPSULE ORAL
COMMUNITY
Start: 2023-01-01

## 2024-08-09 RX ORDER — ATORVASTATIN CALCIUM 40 MG/1
40 TABLET, FILM COATED ORAL DAILY
Qty: 90 TABLET | Refills: 3 | Status: SHIPPED | OUTPATIENT
Start: 2024-08-09

## 2024-08-09 RX ORDER — PLANT STANOL ESTER 450 MG
TABLET ORAL
COMMUNITY
Start: 2024-06-01

## 2024-08-09 RX ORDER — OMEGA-3S/DHA/EPA/FISH OIL/D3 300MG-1000
CAPSULE ORAL
COMMUNITY
Start: 2024-06-01

## 2024-08-09 RX ORDER — ZINC GLUCONATE 50 MG
TABLET ORAL
COMMUNITY
Start: 2023-01-01 | End: 2024-08-09

## 2024-08-09 RX ORDER — METOPROLOL SUCCINATE 50 MG/1
50 TABLET, EXTENDED RELEASE ORAL DAILY
Qty: 90 TABLET | Refills: 3 | Status: SHIPPED | OUTPATIENT
Start: 2024-08-09

## 2024-08-09 ASSESSMENT — PAIN SCALES - GENERAL: PAINLEVEL: NO PAIN (0)

## 2024-08-09 NOTE — PROGRESS NOTES
Preventive Care Visit  [facility]  SOLANGE Wall CNP, Family Medicine  Aug 9, 2024          Assessment & Plan     Routine general medical examination at a health care facility      Hyperlipidemia with target LDL less than 130  Due for labs  Continue atorvastatin  - Lipid panel reflex to direct LDL Fasting; Future  - atorvastatin (LIPITOR) 40 MG tablet; Take 1 tablet (40 mg) by mouth daily  - OFFICE/OUTPT VISIT,EST,LEVL IV    Essential hypertension with goal blood pressure less than 140/90  Well controlled.  - metoprolol succinate ER (TOPROL XL) 50 MG 24 hr tablet; Take 1 tablet (50 mg) by mouth daily  - Basic metabolic panel  (Ca, Cl, CO2, Creat, Gluc, K, Na, BUN); Future  - OFFICE/OUTPT VISIT,EST,LEVL IV    Pre-diabetes  Due for recheck  - Hemoglobin A1c; Future  - OFFICE/OUTPT VISIT,EST,LEVL IV    Foot lesion  Etiology unclear.  Patient would like podiatry referral  - Orthopedic  Referral; Future    Visit for screening mammogram  - MA Screening Bilateral w/ Fritz; Future        Counseling  Appropriate preventive services were addressed with this patient via screening, questionnaire, or discussion as appropriate for fall prevention, nutrition, physical activity, Tobacco-use cessation, weight loss and cognition.  Checklist reviewing preventive services available has been given to the patient.  Reviewed patient's diet, addressing concerns and/or questions.   She is at risk for psychosocial distress and has been provided with information to reduce risk.       The risks, benefits and treatment options of prescribed medications or other treatments have been discussed with the patient. The patient verbalized their understanding and should call or follow up if no improvement or if they develop further problems.  Ilana Mares CNP                    Ameena Johnson is a 59 year old, presenting for the following:  Physical        8/9/2024    10:11 AM   Additional Questions    Roomed by Colleen GUSMAN         8/9/2024    10:11 AM   Patient Reported Additional Medications   Patient reports taking the following new medications none        Health Care Directive  Patient does not have a Health Care Directive or Living Will: Discussed advance care planning with patient; information given to patient to review.    HPI    Fasting for blood work    Right foot lump  3-4 months  Not painful  No trauma      Hyperlipidemia Follow-Up    Are you regularly taking any medication or supplement to lower your cholesterol?   Yes- atorvastatin  Are you having muscle aches or other side effects that you think could be caused by your cholesterol lowering medication?  Yes- atorvastatin    Hypertension Follow-up    Do you check your blood pressure regularly outside of the clinic? No   Are you following a low salt diet? Yes for the last 6 months  Are your blood pressures ever more than 140 on the top number (systolic) OR more   than 90 on the bottom number (diastolic), for example 140/90?  Doesn't check  BP Readings from Last 3 Encounters:   08/09/24 126/80   02/28/24 (!) 149/91   08/07/23 136/88             8/8/2024   General Health   How would you rate your overall physical health? Good   Feel stress (tense, anxious, or unable to sleep) Only a little      (!) STRESS CONCERN      8/8/2024   Nutrition   Three or more servings of calcium each day? Yes   Diet: Low salt    I don't know   How many servings of fruit and vegetables per day? (!) 2-3   How many sweetened beverages each day? 0-1       Multiple values from one day are sorted in reverse-chronological order         8/8/2024   Exercise   Days per week of moderate/strenous exercise 0 days   Average minutes spent exercising at this level 0 min      (!) EXERCISE CONCERN      8/8/2024   Social Factors   Frequency of gathering with friends or relatives Once a week   Worry food won't last until get money to buy more No   Food not last or not have enough money for food? No    Do you have housing? (Housing is defined as stable permanent housing and does not include staying ouside in a car, in a tent, in an abandoned building, in an overnight shelter, or couch-surfing.) Yes   Are you worried about losing your housing? No   Lack of transportation? No   Unable to get utilities (heat,electricity)? No            2024   Fall Risk   Fallen 2 or more times in the past year? No   Trouble with walking or balance? No             2024   Dental   Dentist two times every year? Yes            2024   TB Screening   Were you born outside of the [place]? No          Today's PHQ-9 Score:       2024     1:56 PM   PHQ-9 SCORE   PHQ-9 Total Score MyChart 0   PHQ-9 Total Score 0         2024   Substance Use   Alcohol more than 3/day or more than 7/wk No   Do you use any other substances recreationally? No        Social History     Tobacco Use    Smoking status: Former     Current packs/day: 0.00     Average packs/day: 0.3 packs/day for 9.0 years (2.7 ttl pk-yrs)     Types: Cigarettes     Start date: 2001     Quit date: 2/15/2010     Years since quittin.4    Smokeless tobacco: Never   Vaping Use    Vaping status: Never Used   Substance Use Topics    Alcohol use: Yes     Alcohol/week: 3.3 standard drinks of alcohol     Comment: socially    Drug use: No           2021   LAST FHS-7 RESULTS   1st degree relative breast or ovarian cancer No   Any relative bilateral breast cancer No   Any male have breast cancer No   Any ONE woman have BOTH breast AND ovarian cancer No   Any woman with breast cancer before 50yrs No   2 or more relatives with breast AND/OR ovarian cancer No   2 or more relatives with breast AND/OR bowel cancer No                   2024   STI Screening   New sexual partner(s) since last STI/HIV test? No        History of abnormal Pap smear: No - age 30- 64 PAP with HPV every 5 years recommended        Latest Ref Rng & Units 3/6/2020     8:03 AM 3/6/2020     8:00 AM  7/21/2017     8:45 AM   PAP / HPV   PAP (Historical)  NIL      HPV 16 DNA NEG^Negative  Negative  Negative    HPV 18 DNA NEG^Negative  Negative  Negative    Other HR HPV NEG^Negative  Negative  Negative      ASCVD Risk   The 10-year ASCVD risk score (Saadia SR, et al., 2019) is: 3%    Values used to calculate the score:      Age: 59 years      Sex: Female      Is Non- : No      Diabetic: No      Tobacco smoker: No      Systolic Blood Pressure: 126 mmHg      Is BP treated: Yes      HDL Cholesterol: 55 mg/dL      Total Cholesterol: 171 mg/dL           Reviewed and updated as needed this visit by Provider                          Review of Systems  Constitutional, HEENT, cardiovascular, pulmonary, GI, , musculoskeletal, neuro, skin, endocrine and psych systems are negative, except as otherwise noted.       Objective    Exam  /80 (BP Location: Right arm, Patient Position: Sitting, Cuff Size: Adult Regular)   Pulse 64   Temp 97.8  F (36.6  C) (Tympanic)   Resp 10   Ht 1.524 m (5')   Wt 55.3 kg (122 lb)   LMP 07/01/2018 (Exact Date)   SpO2 99%   BMI 23.83 kg/m     Estimated body mass index is 23.83 kg/m  as calculated from the following:    Height as of this encounter: 1.524 m (5').    Weight as of this encounter: 55.3 kg (122 lb).    Physical Exam  GENERAL: alert and no distress  EYES: Eyes grossly normal to inspection, PERRL and conjunctivae and sclerae normal  HENT: ear canals and TM's normal, nose and mouth without ulcers or lesions  NECK: no adenopathy, no asymmetry, masses, or scars  RESP: lungs clear to auscultation - no rales, rhonchi or wheezes  BREAST: normal without masses, tenderness or nipple discharge and no palpable axillary masses or adenopathy  CV: regular rate and rhythm, normal S1 S2, no S3 or S4, no murmur, click or rub, no peripheral edema  ABDOMEN: soft, nontender, no hepatosplenomegaly, no masses and bowel sounds normal  MS: right great toe, over the  medial aspect of the MTP joint, there is an oblong shaped soft mobile lesion.  SKIN: no suspicious lesions or rashes  NEURO: Normal strength and tone, mentation intact and speech normal  PSYCH: mentation appears normal, affect normal/bright        Signed Electronically by: SOLANGE Wall CNP

## 2024-08-28 ENCOUNTER — OFFICE VISIT (OUTPATIENT)
Dept: PODIATRY | Facility: CLINIC | Age: 59
End: 2024-08-28
Payer: COMMERCIAL

## 2024-08-28 VITALS
DIASTOLIC BLOOD PRESSURE: 86 MMHG | HEART RATE: 58 BPM | WEIGHT: 122 LBS | BODY MASS INDEX: 23.95 KG/M2 | HEIGHT: 60 IN | SYSTOLIC BLOOD PRESSURE: 130 MMHG

## 2024-08-28 DIAGNOSIS — M67.471 GANGLION CYST OF RIGHT FOOT: Primary | ICD-10-CM

## 2024-08-28 DIAGNOSIS — L98.9 FOOT LESION: ICD-10-CM

## 2024-08-28 PROCEDURE — 99203 OFFICE O/P NEW LOW 30 MIN: CPT | Performed by: PODIATRIST

## 2024-08-28 NOTE — LETTER
8/28/2024      Elizabeth Santizo  [address] [facility] [address]      Dear Colleague,    Thank you for referring your patient, Elizabeth Santizo, to the [facility] [place]. Please see a copy of my visit note below.    PATIENT HISTORY:  Elizabeth Santizo is a 59 year old female who presents to clinic in consultation at the request of  Ilana Mares C.N.P. with a chief complaint of bump on the right foot.  The patient is seen by themselves.  The patient relates the pain is primarily located around the lateral side of the foot.  Reports insidious onset without acute precipitating event.  The patient relates that the symptoms have been going on for several month(s).  The patient has previously tried different shoes with little relief. Any previous notes and studies that pertain to the patient's condition were reviewed.    Pertinent medical, surgical and family history was reviewed in the Epic chart.    Past Medical History:   Past Medical History:   Diagnosis Date     Generalized headaches      Hyperlipidemia      Hypertension      Impaired fasting glucose      Overweight(278.02)        Medications:   Current Outpatient Medications:      Ascorbic Acid (VITAMIN C PO), Take 500 mg by mouth daily , Disp: , Rfl:      atorvastatin (LIPITOR) 40 MG tablet, Take 1 tablet (40 mg) by mouth daily, Disp: 90 tablet, Rfl: 3     Coenzyme Q10 (COQ10 MAXIMUM STRENGTH) 400 MG CAPS, , Disp: , Rfl:      Cyanocobalamin (B-12) 100 MCG TABS, Take by mouth daily as needed , Disp: , Rfl:      metoprolol succinate ER (TOPROL XL) 50 MG 24 hr tablet, Take 1 tablet (50 mg) by mouth daily, Disp: 90 tablet, Rfl: 3     Omega-3 Fatty Acids (OMEGA 3 PO), Take 2,400 mg by mouth daily, Disp: , Rfl:      potassium gluconate 2.5 MEQ tablet, , Disp: , Rfl:      cholecalciferol (D-3-5) 125 MCG (5000 UT) CAPS, , Disp: , Rfl:      Allergies:    Allergies   Allergen Reactions     Amoxicillin Rash       Vitals: BP  130/86   Pulse 58   Ht 1.524 m (5')   Wt 55.3 kg (122 lb)   LMP 07/01/2018 (Exact Date)   BMI 23.83 kg/m    BMI= Body mass index is 23.83 kg/m .    LOWER EXTREMITY PHYSICAL EXAM    Dermatologic: Skin is intact to right lower extremity without significant lesions, rash or abrasion.        Vascular: DP & PT pulses are intact & regular on the right.   CFT and skin temperature is normal to the right lower extremity.     Neurologic: Lower extremity sensation is intact to light touch.  No evidence of weakness in the right lower extremity.        Musculoskeletal: Patient is ambulatory without assistive device or brace.  No gross ankle deformity noted.  No foot or ankle joint effusion is noted.  Noted fluid-filled soft tissue mass over the medial aspect of the first metatarsophalangeal joint on the right foot.  No surrounding erythema noted.  Noted pain on palpation over the soft tissue mass.           ASSESSMENT / PLAN:     ICD-10-CM    1. Ganglion cyst of right foot  M67.471 Orthopedic  Referral      2. Foot lesion  L98.9 Orthopedic  Referral          I have explained to Elizabeth about the conditions.  We discussed the underlying contributing factors to the condition as well as both conservative and surgical treatment options along with expected length of recovery.  At this time, The patient was referred to [facility] for an ultrasound guided aspiration and steroid injection of the ganglion cyst on the right foot.      Elizabeth verbalized agreement with and understanding of the rational for the diagnosis and treatment plan.  All questions were answered to best of my ability and the patient's satisfaction. The patient was advised to contact the clinic with any questions that may arise after the clinic visit.      Disclaimer: This note consists of symbols derived from keyboarding, dictation and/or voice recognition software. As a result, there may be errors in the script that  have gone undetected. Please consider this when interpreting information found in this chart.       ABRAN Davis D.P.M., F.JUSTIN.C.F.A.S.      Again, thank you for allowing me to participate in the care of your patient.        Sincerely,        Timothy Davis DPM

## 2024-08-28 NOTE — NURSING NOTE
Chief Complaint   Patient presents with    Consult     Right foot - soft tissue lump       Initial /86   Pulse 58   Ht 1.524 m (5')   Wt 55.3 kg (122 lb)   LMP 07/01/2018 (Exact Date)   BMI 23.83 kg/m   Estimated body mass index is 23.83 kg/m  as calculated from the following:    Height as of this encounter: 1.524 m (5').    Weight as of this encounter: 55.3 kg (122 lb).  Medications and allergies reviewed.      Mamie RM MA

## 2024-08-28 NOTE — PROGRESS NOTES
PATIENT HISTORY:  Elizabeth Santizo is a 59 year old female who presents to clinic in consultation at the request of  Ilana Mares C.N.P. with a chief complaint of bump on the right foot.  The patient is seen by themselves.  The patient relates the pain is primarily located around the lateral side of the foot.  Reports insidious onset without acute precipitating event.  The patient relates that the symptoms have been going on for several month(s).  The patient has previously tried different shoes with little relief. Any previous notes and studies that pertain to the patient's condition were reviewed.    Pertinent medical, surgical and family history was reviewed in the Epic chart.    Past Medical History:   Past Medical History:   Diagnosis Date    Generalized headaches     Hyperlipidemia     Hypertension     Impaired fasting glucose     Overweight(278.02)        Medications:   Current Outpatient Medications:     Ascorbic Acid (VITAMIN C PO), Take 500 mg by mouth daily , Disp: , Rfl:     atorvastatin (LIPITOR) 40 MG tablet, Take 1 tablet (40 mg) by mouth daily, Disp: 90 tablet, Rfl: 3    Coenzyme Q10 (COQ10 MAXIMUM STRENGTH) 400 MG CAPS, , Disp: , Rfl:     Cyanocobalamin (B-12) 100 MCG TABS, Take by mouth daily as needed , Disp: , Rfl:     metoprolol succinate ER (TOPROL XL) 50 MG 24 hr tablet, Take 1 tablet (50 mg) by mouth daily, Disp: 90 tablet, Rfl: 3    Omega-3 Fatty Acids (OMEGA 3 PO), Take 2,400 mg by mouth daily, Disp: , Rfl:     potassium gluconate 2.5 MEQ tablet, , Disp: , Rfl:     cholecalciferol (D-3-5) 125 MCG (5000 UT) CAPS, , Disp: , Rfl:      Allergies:    Allergies   Allergen Reactions    Amoxicillin Rash       Vitals: /86   Pulse 58   Ht 1.524 m (5')   Wt 55.3 kg (122 lb)   LMP 07/01/2018 (Exact Date)   BMI 23.83 kg/m    BMI= Body mass index is 23.83 kg/m .    LOWER EXTREMITY PHYSICAL EXAM    Dermatologic: Skin is intact to right lower extremity without significant lesions, rash  or abrasion.        Vascular: DP & PT pulses are intact & regular on the right.   CFT and skin temperature is normal to the right lower extremity.     Neurologic: Lower extremity sensation is intact to light touch.  No evidence of weakness in the right lower extremity.        Musculoskeletal: Patient is ambulatory without assistive device or brace.  No gross ankle deformity noted.  No foot or ankle joint effusion is noted.  Noted fluid-filled soft tissue mass over the medial aspect of the first metatarsophalangeal joint on the right foot.  No surrounding erythema noted.  Noted pain on palpation over the soft tissue mass.           ASSESSMENT / PLAN:     ICD-10-CM    1. Ganglion cyst of right foot  M67.471 Orthopedic  Referral      2. Foot lesion  L98.9 Orthopedic  Referral          I have explained to Elizabeth about the conditions.  We discussed the underlying contributing factors to the condition as well as both conservative and surgical treatment options along with expected length of recovery.  At this time, The patient was referred to [facility] for an ultrasound guided aspiration and steroid injection of the ganglion cyst on the right foot.      Elizabeth verbalized agreement with and understanding of the rational for the diagnosis and treatment plan.  All questions were answered to best of my ability and the patient's satisfaction. The patient was advised to contact the clinic with any questions that may arise after the clinic visit.      Disclaimer: This note consists of symbols derived from keyboarding, dictation and/or voice recognition software. As a result, there may be errors in the script that have gone undetected. Please consider this when interpreting information found in this chart.       ABRAN Davis D.P.M., FLORENA.VI.F.A.S.

## 2024-09-04 ENCOUNTER — E-VISIT (OUTPATIENT)
Dept: URGENT CARE | Facility: CLINIC | Age: 59
End: 2024-09-04
Payer: COMMERCIAL

## 2024-09-04 DIAGNOSIS — J01.90 ACUTE SINUSITIS WITH SYMPTOMS > 10 DAYS: Primary | ICD-10-CM

## 2024-09-04 PROCEDURE — 99421 OL DIG E/M SVC 5-10 MIN: CPT | Performed by: EMERGENCY MEDICINE

## 2024-09-04 RX ORDER — DOXYCYCLINE HYCLATE 100 MG
100 TABLET ORAL 2 TIMES DAILY
Qty: 14 TABLET | Refills: 0 | Status: SHIPPED | OUTPATIENT
Start: 2024-09-04 | End: 2024-09-11

## 2024-09-04 NOTE — PATIENT INSTRUCTIONS
Acute Sinusitis: Care Instructions  Overview     Acute sinusitis is an inflammation of the mucous membranes inside the nose and sinuses. Sinuses are the hollow spaces in your skull around the eyes and nose. Acute sinusitis often follows a cold. Acute sinusitis causes thick, discolored mucus that drains from the nose or down the back of the throat. It also can cause pain and pressure in your head and face along with a stuffy or blocked nose.  In most cases, sinusitis gets better on its own in 1 to 2 weeks. But some mild symptoms may last for several weeks. Sometimes antibiotics are needed if there is a bacterial infection.  Follow-up care is a key part of your treatment and safety. Be sure to make and go to all appointments, and call your doctor if you are having problems. It's also a good idea to know your test results and keep a list of the medicines you take.  How can you care for yourself at home?  Use saline (saltwater) nasal washes. This can help keep your nasal passages open and wash out mucus and allergens.  You can buy saline nose washes at a grocery store or drugstore. Follow the instructions on the package.  You can make your own at home. Add 1 teaspoon of non-iodized salt and 1 teaspoon of baking soda to 2 cups of distilled or boiled and cooled water. Fill a squeeze bottle or a nasal cleansing pot (such as a neti pot) with the nasal wash. Then put the tip into your nostril, and lean over the sink. With your mouth open, gently squirt the liquid. Repeat on the other side.  Try a decongestant nasal spray like oxymetazoline (Afrin). Do not use it for more than 3 days in a row. Using it for more than 3 days can make your congestion worse.  If needed, take an over-the-counter pain medicine, such as acetaminophen (Tylenol), ibuprofen (Advil, Motrin), or naproxen (Aleve). Read and follow all instructions on the label.  If the doctor prescribed antibiotics, take them as directed. Do not stop taking them just  "because you feel better. You need to take the full course of antibiotics.  Be careful when taking over-the-counter cold or flu medicines and Tylenol at the same time. Many of these medicines have acetaminophen, which is Tylenol. Read the labels to make sure that you are not taking more than the recommended dose. Too much acetaminophen (Tylenol) can be harmful.  Try a steroid nasal spray. It may help with your symptoms.  Breathe warm, moist air. You can use a steamy shower, a hot bath, or a sink filled with hot water. Avoid cold, dry air. Using a humidifier in your home may help. Follow the directions for cleaning the machine.  When should you call for help?   Call your doctor now or seek immediate medical care if:    You have new or worse swelling, redness, or pain in your face or around one or both of your eyes.     You have double vision or a change in your vision.     You have a high fever.     You have a severe headache and a stiff neck.     You have mental changes, such as feeling confused or much less alert.   Watch closely for changes in your health, and be sure to contact your doctor if:    You are not getting better as expected.   Where can you learn more?  Go to https://www.[employer].net/patiented  Enter I933 in the search box to learn more about \"Acute Sinusitis: Care Instructions.\"  Current as of: September 27, 2023               Content Version: 14.0    8366-7284 [employer].   Care instructions adapted under license by your healthcare professional. If you have questions about a medical condition or this instruction, always ask your healthcare professional. [employer] disclaims any warranty or liability for your use of this information.      Dear Elizabeth Santizo        Based on your responses and diagnosis, I have prescribed doxycycline  to treat your symptoms. I have sent this to your pharmacy.?     It is also important to stay well hydrated, get lots of rest and take " over-the-counter decongestants,?tylenol?or ibuprofen if you?are able to?take those medications per your primary care provider to help relieve discomfort.?     It is important that you take?all of?your prescribed medication even if your symptoms are improving after a few doses.? Taking?all of?your medicine helps prevent the symptoms from returning.?     If your symptoms worsen, you develop severe headache, vomiting, high fever (>102), or are not improving in 7 days, please contact your primary care provider for an appointment or visit any of our convenient Walk-in Care or Urgent Care Centers to be seen which can be found on our website?here.?     Thanks again for choosing?us?as your health care partner,?   ?  Van Stuart MD?   Thank you for choosing us for your care. I have placed an order for a prescription so that you can start treatment. View your full visit summary for details by clicking on the link below. Your pharmacist will able to address any questions you may have about the medication.     If you're not feeling better within 5-7 days, please schedule an appointment.  You can schedule an appointment right here in [facility], or call [phone]  If the visit is for the same symptoms as your eVisit, we'll refund the cost of your eVisit if seen within seven days.

## 2024-12-17 ENCOUNTER — PATIENT OUTREACH (OUTPATIENT)
Dept: CARE COORDINATION | Facility: CLINIC | Age: 59
End: 2024-12-17
Payer: COMMERCIAL

## 2025-01-28 ENCOUNTER — TELEPHONE (OUTPATIENT)
Dept: FAMILY MEDICINE | Facility: CLINIC | Age: 60
End: 2025-01-28
Payer: COMMERCIAL

## 2025-01-28 NOTE — LETTER
January 28, 2025    To  Elizabeth Santizo  [address] [place] [address]    Your team at [facility] cares about your health. We have reviewed your chart and based on our findings; we are making the following recommendations to better manage your health.     You are in particular need of attention regarding the following:     Call or MyChart message your clinic to schedule a colonoscopy, schedule/ a FIT Test, or order a Cologuard test. If you are unsure what type of test you need, please call your clinic and speak to clinic staff.   Colon cancer is now the second leading cause of cancer-related deaths in the [place] for both men and women and there are over 130,000 new cases and 50,000 deaths per year from colon cancer. Colonoscopies can prevent 90-95% of these deaths. Problem lesions can be removed before they ever become cancer. This test is not only looking for cancer, but also getting rid of precancerous lesions.   Schedule Annual MAMMOGRAPHY. [facility] scheduling number is [phone] or schedule in MyChart (self referral).  1 in 8 women will develop invasive breast cancer during her lifetime and it is the most common non-skin cancer in [place]. EARLY detection, new treatments, and a better understanding of the disease have increased survival rates- the 5 year survival rate in the 1960's was 63% and today it is close to 90%.    If you have already completed these items, please contact the clinic via phone or   [employer] so your care team can review and update your records. Thank you for   choosing [facility] for your healthcare needs. For any questions,   concerns, or to schedule an appointment please contact our clinic.    Healthy Regards,      Your [facility] Care Team            Electronically signed

## 2025-01-28 NOTE — TELEPHONE ENCOUNTER
Patient Quality Outreach    Patient is due for the following:   Colon Cancer Screening  Breast Cancer Screening - Mammogram      Topic Date Due    Hepatitis B Vaccine (1 of 3 - 19+ 3-dose series) Never done    Pneumococcal Vaccine (1 of 1 - PCV) Never done    Flu Vaccine (1) 09/01/2024    COVID-19 Vaccine (4 - 2024-25 season) 09/01/2024       Action(s) Taken:   Schedule a mammogram  Decide on colon cancer screening type  Type of outreach:    Sent letter.    Questions for provider review:    None           Colleen Del Valle, CMA

## 2025-03-04 DIAGNOSIS — I10 ESSENTIAL HYPERTENSION WITH GOAL BLOOD PRESSURE LESS THAN 140/90: ICD-10-CM

## 2025-03-04 DIAGNOSIS — E78.5 HYPERLIPIDEMIA WITH TARGET LDL LESS THAN 130: ICD-10-CM

## 2025-03-04 RX ORDER — METOPROLOL SUCCINATE 50 MG/1
50 TABLET, EXTENDED RELEASE ORAL DAILY
Qty: 90 TABLET | Refills: 1 | Status: SHIPPED | OUTPATIENT
Start: 2025-03-04

## 2025-03-04 RX ORDER — ATORVASTATIN CALCIUM 40 MG/1
40 TABLET, FILM COATED ORAL DAILY
Qty: 90 TABLET | Refills: 1 | Status: SHIPPED | OUTPATIENT
Start: 2025-03-04

## 2025-03-04 NOTE — TELEPHONE ENCOUNTER
Pending Prescriptions:                       Disp   Refills    atorvastatin (LIPITOR) 40 MG tablet       90 tab*3            Sig: Take 1 tablet (40 mg) by mouth daily.    metoprolol succinate ER (TOPROL XL) 50 MG*90 tab*3            Sig: Take 1 tablet (50 mg) by mouth daily.

## 2025-04-03 ENCOUNTER — TELEPHONE (OUTPATIENT)
Dept: FAMILY MEDICINE | Facility: CLINIC | Age: 60
End: 2025-04-03
Payer: COMMERCIAL

## 2025-04-03 DIAGNOSIS — N81.9 FEMALE GENITAL PROLAPSE, UNSPECIFIED TYPE: Primary | ICD-10-CM

## 2025-04-03 NOTE — TELEPHONE ENCOUNTER
Order/Referral Request    Who is requesting: patient    Orders being requested: woman's health    Reason service is needed/diagnosis: female issues-would not say what type of issues, she states Ilana knows    When are orders needed by: asap    Has this been discussed with Provider: Yes    Does patient have a preference on a Group/Provider/Facility? [facility]    Does patient have an appointment scheduled?: No    Where to send orders: Place orders within Epic    [facility] or would you prefer to receive a phone call?:   My Chart or call [phone] ok to leave detailed message

## 2025-04-04 NOTE — TELEPHONE ENCOUNTER
Ilana,    Patient stating increase in pelvic floor dropping and requesting referral.    Referral pended.    Thank you  Kane KRAMER RN  [facility]

## 2025-04-14 ENCOUNTER — HOSPITAL ENCOUNTER (OUTPATIENT)
Dept: MAMMOGRAPHY | Facility: CLINIC | Age: 60
Discharge: HOME | End: 2025-04-14
Attending: NURSE PRACTITIONER | Admitting: NURSE PRACTITIONER
Payer: COMMERCIAL

## 2025-04-14 DIAGNOSIS — Z12.31 VISIT FOR SCREENING MAMMOGRAM: ICD-10-CM

## 2025-04-14 PROCEDURE — 77063 BREAST TOMOSYNTHESIS BI: CPT

## 2025-04-14 PROCEDURE — 77067 SCR MAMMO BI INCL CAD: CPT

## 2025-04-18 ENCOUNTER — ORDERS ONLY (AUTO-RELEASED) (OUTPATIENT)
Dept: FAMILY MEDICINE | Facility: CLINIC | Age: 60
End: 2025-04-18
Payer: COMMERCIAL

## 2025-04-18 DIAGNOSIS — Z12.11 COLON CANCER SCREENING: ICD-10-CM

## 2025-06-17 ENCOUNTER — OFFICE VISIT (OUTPATIENT)
Dept: OBGYN | Facility: CLINIC | Age: 60
End: 2025-06-17
Attending: NURSE PRACTITIONER
Payer: COMMERCIAL

## 2025-06-17 VITALS
TEMPERATURE: 97.8 F | RESPIRATION RATE: 16 BRPM | WEIGHT: 125.6 LBS | HEART RATE: 64 BPM | SYSTOLIC BLOOD PRESSURE: 125 MMHG | DIASTOLIC BLOOD PRESSURE: 81 MMHG | BODY MASS INDEX: 24.66 KG/M2 | HEIGHT: 60 IN

## 2025-06-17 DIAGNOSIS — N81.9 FEMALE GENITAL PROLAPSE, UNSPECIFIED TYPE: ICD-10-CM

## 2025-06-17 DIAGNOSIS — Z00.00 ROUTINE GENERAL MEDICAL EXAMINATION AT A HEALTH CARE FACILITY: Primary | ICD-10-CM

## 2025-06-17 LAB
HPV HR 12 DNA CVX QL NAA+PROBE: NEGATIVE
HPV16 DNA CVX QL NAA+PROBE: NEGATIVE
HPV18 DNA CVX QL NAA+PROBE: NEGATIVE
HUMAN PAPILLOMA VIRUS FINAL DIAGNOSIS: NORMAL

## 2025-06-17 PROCEDURE — 3079F DIAST BP 80-89 MM HG: CPT | Performed by: OBSTETRICS & GYNECOLOGY

## 2025-06-17 PROCEDURE — 3074F SYST BP LT 130 MM HG: CPT | Performed by: OBSTETRICS & GYNECOLOGY

## 2025-06-17 PROCEDURE — 87624 HPV HI-RISK TYP POOLED RSLT: CPT | Performed by: OBSTETRICS & GYNECOLOGY

## 2025-06-17 PROCEDURE — 99204 OFFICE O/P NEW MOD 45 MIN: CPT | Performed by: OBSTETRICS & GYNECOLOGY

## 2025-06-17 ASSESSMENT — PATIENT HEALTH QUESTIONNAIRE - PHQ9
10. IF YOU CHECKED OFF ANY PROBLEMS, HOW DIFFICULT HAVE THESE PROBLEMS MADE IT FOR YOU TO DO YOUR WORK, TAKE CARE OF THINGS AT HOME, OR GET ALONG WITH OTHER PEOPLE: NOT DIFFICULT AT ALL
SUM OF ALL RESPONSES TO PHQ QUESTIONS 1-9: 0
SUM OF ALL RESPONSES TO PHQ QUESTIONS 1-9: 0

## 2025-06-17 NOTE — NURSING NOTE
Initial /81 (BP Location: Right arm, Patient Position: Chair, Cuff Size: Adult Regular)   Pulse 64   Temp 97.8  F (36.6  C) (Tympanic)   Resp 16   Ht 1.524 m (5')   Wt 57 kg (125 lb 9.6 oz)   LMP 07/01/2018 (Exact Date)   BMI 24.53 kg/m   Estimated body mass index is 24.53 kg/m  as calculated from the following:    Height as of this encounter: 1.524 m (5').    Weight as of this encounter: 57 kg (125 lb 9.6 oz). .  Ramonita Anthony, CMA

## 2025-06-17 NOTE — PROGRESS NOTES
Gynecology Consult Note    HPI: Elizabeth Santizo is a 60 year old  who presents for evaluation of vaginal prolapse.  The patient states that it has been ongoing for several months but has been progressively getting more bothersome to her.  She notices a bulge when she is more active and states she has the very mild discomfort.  No difficulties with urinating or bowel movements.  No vaginal bleeding.  Otherwise feeling well.  No significant pelvic pain.    ROS: 10 pt ROS neg other than HPI    PMH:   Past Medical History:   Diagnosis Date    Generalized headaches     Hyperlipidemia     Hypertension     Impaired fasting glucose     Overweight(278.02)        PSHx:   Past Surgical History:   Procedure Laterality Date    TUBAL LIGATION         Medications:   Current Outpatient Medications   Medication Sig Dispense Refill    Ascorbic Acid (VITAMIN C PO) Take 500 mg by mouth daily       atorvastatin (LIPITOR) 40 MG tablet Take 1 tablet (40 mg) by mouth daily. 90 tablet 1    Cyanocobalamin (B-12) 100 MCG TABS Take by mouth daily as needed       metoprolol succinate ER (TOPROL XL) 50 MG 24 hr tablet Take 1 tablet (50 mg) by mouth daily. 90 tablet 1    Omega-3 Fatty Acids (OMEGA 3 PO) Take 2,400 mg by mouth daily      potassium gluconate 2.5 MEQ tablet        No current facility-administered medications for this visit.        Allergies:      Allergies   Allergen Reactions    Amoxicillin Rash       Social History:   Social History     Socioeconomic History    Marital status:      Spouse name: Not on file    Number of children: Not on file    Years of education: Not on file    Highest education level: Not on file   Occupational History    Not on file   Tobacco Use    Smoking status: Former     Current packs/day: 0.00     Average packs/day: 0.3 packs/day for 9.0 years (2.7 ttl pk-yrs)     Types: Cigarettes     Start date: 2001     Quit date: 2/15/2010     Years since quitting: 15.3     Passive exposure:  Past    Smokeless tobacco: Never   Vaping Use    Vaping status: Never Used   Substance and Sexual Activity    Alcohol use: Yes     Alcohol/week: 3.3 standard drinks of alcohol     Comment: socially    Drug use: No    Sexual activity: Yes     Partners: Male     Birth control/protection: Male Surgical, Female Surgical   Other Topics Concern    Parent/sibling w/ CABG, MI or angioplasty before 65F 55M? Yes     Comment: father -  of heart attack at age 55   Social History Narrative    Perimenopausal, menses every other month since , flow normal    Last pap , normal, last abnormal - s/p ablation    Mammogram, , no abnormal        Vaginal deliveries        Clinical researcher    Lives with                      Social Drivers of Health     Financial Resource Strain: Low Risk  (2024)    Financial Resource Strain     Within the past 12 months, have you or your family members you live with been unable to get utilities (heat, electricity) when it was really needed?: No   Food Insecurity: Low Risk  (2024)    Food Insecurity     Within the past 12 months, did you worry that your food would run out before you got money to buy more?: No     Within the past 12 months, did the food you bought just not last and you didn t have money to get more?: No   Transportation Needs: Low Risk  (2024)    Transportation Needs     Within the past 12 months, has lack of transportation kept you from medical appointments, getting your medicines, non-medical meetings or appointments, work, or from getting things that you need?: No   Physical Activity: Inactive (2024)    Exercise Vital Sign     Days of Exercise per Week: 0 days     Minutes of Exercise per Session: 0 min   Stress: No Stress Concern Present (2024)    [place] [employer] - Occupational Stress Questionnaire     Feeling of Stress : Only a little   Social Connections: Unknown (2024)    Social Connection and  Isolation Panel [NHANES]     Frequency of Communication with Friends and Family: Not on file     Frequency of Social Gatherings with Friends and Family: Once a week     Attends Evangelical Services: Not on file     Active Member of Clubs or Organizations: Not on file     Attends Club or Organization Meetings: Not on file     Marital Status: Not on file   Interpersonal Safety: Low Risk  (2024)    Interpersonal Safety     Do you feel physically and emotionally safe where you currently live?: Yes     Within the past 12 months, have you been hit, slapped, kicked or otherwise physically hurt by someone?: No     Within the past 12 months, have you been humiliated or emotionally abused in other ways by your partner or ex-partner?: No   Housing Stability: Low Risk  (2024)    Housing Stability     Do you have housing? : Yes     Are you worried about losing your housing?: No       Family History:  Family History   Problem Relation Age of Onset    Diabetes Mother         passed away in     Hypertension Mother     Hyperlipidemia Mother     C.A.D. Father          55 from MI    Cerebrovascular Disease Father          of heart attack at 55    Cerebrovascular Disease Maternal Grandmother         passed away in     Diabetes Maternal Grandmother         passed away in     Hypertension Maternal Grandmother     Hyperlipidemia Maternal Grandmother     Cancer Maternal Aunt         aunt    Diabetes Cousin         3 cousins    Diabetes Other         maternal uncle    Other Cancer Other         aunt    Diabetes Other         maternal aunt    Diabetes Other         maternal uncle    Diabetes Other         maternal aunt    Diabetes Other         all 7 aunts and uncles, 4 passed away    Diabetes Cousin         Manoj passed away at 35, Ryan passed away at 49    Diabetes Other         passed away     Breast Cancer No family hx of     Cancer - colorectal No family hx of        Physical Exam:   Vitals:    25 0825    BP: 125/81   BP Location: Right arm   Patient Position: Chair   Cuff Size: Adult Regular   Pulse: 64   Resp: 16   Temp: 97.8  F (36.6  C)   TempSrc: Tympanic   Weight: 57 kg (125 lb 9.6 oz)   Height: 1.524 m (5')      Gen: lying in bed, NAD  CV: Reg rate, well perfused  Pulm: no increased work of breathing  Abd: non-tender, non-distended, no masses   Pelvis: normal appearing external genitalia, vaginal mucosa, cervix, bimanual exam with normal size and contour of uterus with no adnexal masses, grade 1 apical prolapse noted with grade 2 cystocele.  Extremities: non-tender, no erythema; no edema  Psych: normal mood and affect  Neuro: no focal deficits        A&P Elizabeth is a 60-year-old woman who presents for evaluation of vaginal prolapse.  On examination patient does have grade 2 cystocele and grade 1 apical prolapse noted.  She is quite bothered by her symptoms and would like to intervene.  Discussed with patient nature of vaginal prolapse and reviewed treatment options in detail.  Discussed that decision for treatment is largely based on patient's symptoms and desire for management.  Initial management is often pelvic floor physical therapy.  Discussed that this may slightly improve her prolapse but may also help prevent further worsening.  She is very interested in pelvic PT and order was placed.  Additionally discussed options of pessary as well as prolapse surgeries for management of prolapse.  Did discuss potential worsening of stress incontinence with repair of prolapse.  Currently she has only rare very mild leaking.  After discussion the patient wished to proceed with pelvic floor PT and is still considering whether she would be interested in pessary or surgery for management.  Did give her the names of my partners to complete prolapse surgeries and discussed that if she desires pessary she can certainly follow-up with me for  fitting.  Patient states understanding and agreement with plan of care.  All  questions answered.    I spent 45 minutes reviewing chart, obtaining history, counseling, examining, coordinating care, documenting this counter.  Pap was collected as due.  Mamie Snider MD   6/17/2025 11:50 AM

## 2025-06-18 ENCOUNTER — RESULTS FOLLOW-UP (OUTPATIENT)
Dept: OBGYN | Facility: CLINIC | Age: 60
End: 2025-06-18

## 2025-06-29 ENCOUNTER — E-VISIT (OUTPATIENT)
Dept: URGENT CARE | Facility: CLINIC | Age: 60
End: 2025-06-29
Payer: COMMERCIAL

## 2025-06-29 DIAGNOSIS — N30.90 BLADDER INFECTION: Primary | ICD-10-CM

## 2025-06-29 RX ORDER — NITROFURANTOIN 25; 75 MG/1; MG/1
100 CAPSULE ORAL 2 TIMES DAILY
Qty: 10 CAPSULE | Refills: 0 | Status: SHIPPED | OUTPATIENT
Start: 2025-06-29 | End: 2025-07-04

## 2025-06-29 NOTE — PATIENT INSTRUCTIONS
Dear Elizabeth Santizo    After reviewing your responses, I've been able to diagnose you with a urinary tract infection, which is a common infection of the bladder with bacteria.  This is not a sexually transmitted infection, though urinating immediately after intercourse can help prevent infections.  Drinking lots of fluids is also helpful to clear your current infection and prevent the next one.      I have sent a prescription for antibiotics to your pharmacy to treat this infection.    It is important that you take all of your prescribed medication even if your symptoms are improving after a few doses.  Taking all of your medicine helps prevent the symptoms from returning.     If your symptoms worsen, you develop pain in your back or stomach, develop fevers, or are not improving in 5 days, please contact your primary care provider for an appointment or visit any of our convenient Walk-in or Urgent Care Centers to be seen, which can be found on our website here.    Thanks again for choosing us as your health care partner,    Victoria Sebastian PA-C  Female Urinary Tract Infection (UTI): Care Instructions  Overview    A urinary tract infection (UTI) is an infection caused by bacteria. It can happen anywhere in the urinary tract. A UTI can happen in the:  Kidneys.  Ureters, the tubes that connect the kidneys to the bladder.  Bladder.  Urethra, where the urine comes out.  Most UTIs are bladder infections. They often cause pain or burning when you urinate.  Most UTIs can be cured with antibiotics. If you are prescribed antibiotics, be sure to complete your treatment so that the infection does not get worse.  Follow-up care is a key part of your treatment and safety. Be sure to make and go to all appointments, and call your doctor if you are having problems. It's also a good idea to know your test results and keep a list of the medicines you take.  How can you care for yourself at home?  Take your antibiotics as directed.  Do not stop taking them just because you feel better. You need to take the full course of antibiotics.  Drink extra water and other fluids for the next day or two. This will help make the urine less concentrated and help wash out the bacteria that are causing the infection. (If you have kidney, heart, or liver disease and have to limit fluids, talk with your doctor before you increase the amount of fluids you drink.)  Avoid drinks that are carbonated or have caffeine. They can irritate the bladder.  Urinate often. Try to empty your bladder each time.  To relieve pain, try taking a warm bath in plain water or laying a heating pad set on low over your lower belly or genital area. Never go to sleep with a heating pad in place.  Avoid bubble baths and hygiene sprays, powders, or perfumes in the vagina or on the vulva. Do not douche. These things can irritate the urethra and may make symptoms worse.  To help prevent UTIs  Drink plenty of water each day. This helps you urinate often, which clears bacteria from your system. (If you have kidney, heart, or liver disease and have to limit fluids, talk with your doctor before you increase the amount of fluids you drink.)  Urinate when you need to.  If you are sexually active, urinate right after you have sex.  Change sanitary pads often.  After going to the bathroom, wipe from front to back.  When should you call for help?  Contact your doctor now or seek immediate medical care if:  Symptoms such as a fever, chills, nausea, or vomiting get worse or happen for the first time.  You have new pain in your back just below your rib cage. This is called flank pain.  There is new blood or pus in your urine.  You are not able to take or keep down your antibiotics.  Watch closely for changes in your health, and be sure to contact your doctor if:  You are not getting better after taking an antibiotic for 2 days.  Your symptoms go away but then come back.  Where can you learn more?  Go to  "https://www.[employer].net/patiented  Enter K848 in the search box to learn more about \"Female Urinary Tract Infection (UTI): Care Instructions.\"  Current as of: April 30, 2024  Content Version: 14.5 2024-2025 [employer].   Care instructions adapted under license by your healthcare professional. If you have questions about a medical condition or this instruction, always ask your healthcare professional. [employer] disclaims any warranty or liability for your use of this information.    "

## 2025-07-10 ENCOUNTER — PATIENT OUTREACH (OUTPATIENT)
Dept: CARE COORDINATION | Facility: CLINIC | Age: 60
End: 2025-07-10
Payer: COMMERCIAL

## 2025-07-24 ENCOUNTER — PATIENT OUTREACH (OUTPATIENT)
Dept: CARE COORDINATION | Facility: CLINIC | Age: 60
End: 2025-07-24
Payer: COMMERCIAL

## 2025-08-15 ENCOUNTER — THERAPY VISIT (OUTPATIENT)
Dept: PHYSICAL THERAPY | Facility: CLINIC | Age: 60
End: 2025-08-15
Attending: OBSTETRICS & GYNECOLOGY
Payer: COMMERCIAL

## 2025-08-15 DIAGNOSIS — N81.9 FEMALE GENITAL PROLAPSE, UNSPECIFIED TYPE: ICD-10-CM

## 2025-08-15 PROCEDURE — 97161 PT EVAL LOW COMPLEX 20 MIN: CPT | Mod: GP | Performed by: PHYSICAL THERAPIST

## 2025-08-15 PROCEDURE — 97110 THERAPEUTIC EXERCISES: CPT | Mod: GP | Performed by: PHYSICAL THERAPIST

## 2025-08-15 PROCEDURE — 97530 THERAPEUTIC ACTIVITIES: CPT | Mod: GP | Performed by: PHYSICAL THERAPIST

## 2025-08-19 PROBLEM — N81.9 FEMALE GENITAL PROLAPSE, UNSPECIFIED TYPE: Status: ACTIVE | Noted: 2025-08-19

## 2025-08-26 ENCOUNTER — THERAPY VISIT (OUTPATIENT)
Dept: PHYSICAL THERAPY | Facility: CLINIC | Age: 60
End: 2025-08-26
Attending: OBSTETRICS & GYNECOLOGY
Payer: COMMERCIAL

## 2025-08-26 DIAGNOSIS — N81.9 FEMALE GENITAL PROLAPSE, UNSPECIFIED TYPE: Primary | ICD-10-CM

## 2025-08-26 PROCEDURE — 97110 THERAPEUTIC EXERCISES: CPT | Mod: GP | Performed by: PHYSICAL THERAPIST
